# Patient Record
Sex: FEMALE | Race: WHITE | NOT HISPANIC OR LATINO | Employment: UNEMPLOYED | URBAN - NONMETROPOLITAN AREA
[De-identification: names, ages, dates, MRNs, and addresses within clinical notes are randomized per-mention and may not be internally consistent; named-entity substitution may affect disease eponyms.]

---

## 2018-05-09 ENCOUNTER — TRANSCRIBE ORDERS (OUTPATIENT)
Dept: ADMINISTRATIVE | Facility: HOSPITAL | Age: 58
End: 2018-05-09

## 2018-05-09 DIAGNOSIS — Z12.39 SCREENING BREAST EXAMINATION: ICD-10-CM

## 2018-05-09 DIAGNOSIS — M81.8 OSTEOPOROSIS AND OCULOCUTANEOUS HYPOPIGMENTATION SYNDROME: Primary | ICD-10-CM

## 2018-05-09 DIAGNOSIS — L81.8 OSTEOPOROSIS AND OCULOCUTANEOUS HYPOPIGMENTATION SYNDROME: Primary | ICD-10-CM

## 2018-05-09 DIAGNOSIS — Q87.89 OSTEOPOROSIS AND OCULOCUTANEOUS HYPOPIGMENTATION SYNDROME: Primary | ICD-10-CM

## 2018-05-24 ENCOUNTER — TRANSCRIBE ORDERS (OUTPATIENT)
Dept: CARDIOLOGY | Facility: CLINIC | Age: 58
End: 2018-05-24

## 2018-05-24 DIAGNOSIS — R07.9 CHEST PAIN, UNSPECIFIED TYPE: Primary | ICD-10-CM

## 2018-05-24 DIAGNOSIS — R06.02 SHORTNESS OF BREATH: ICD-10-CM

## 2018-05-24 DIAGNOSIS — R07.9 CHEST PAIN, UNSPECIFIED TYPE: ICD-10-CM

## 2018-05-24 DIAGNOSIS — R06.02 SHORTNESS OF BREATH: Primary | ICD-10-CM

## 2018-06-19 ENCOUNTER — APPOINTMENT (OUTPATIENT)
Dept: BONE DENSITY | Facility: HOSPITAL | Age: 58
End: 2018-06-19

## 2018-06-19 ENCOUNTER — APPOINTMENT (OUTPATIENT)
Dept: MAMMOGRAPHY | Facility: HOSPITAL | Age: 58
End: 2018-06-19

## 2018-06-19 LAB
BH CV STRESS BP STAGE 1: NORMAL
BH CV STRESS BP STAGE 2: NORMAL
BH CV STRESS DURATION MIN STAGE 1: 3
BH CV STRESS DURATION MIN STAGE 2: 3
BH CV STRESS DURATION MIN STAGE 3: 3
BH CV STRESS DURATION SEC STAGE 1: 0
BH CV STRESS DURATION SEC STAGE 2: 0
BH CV STRESS DURATION SEC STAGE 3: 0
BH CV STRESS GRADE STAGE 1: 10
BH CV STRESS GRADE STAGE 2: 12
BH CV STRESS GRADE STAGE 3: 14
BH CV STRESS HR STAGE 1: 121
BH CV STRESS HR STAGE 2: 139
BH CV STRESS HR STAGE 3: 161
BH CV STRESS METS STAGE 1: 5
BH CV STRESS METS STAGE 2: 7.5
BH CV STRESS METS STAGE 3: 10
BH CV STRESS O2 STAGE 1: 99
BH CV STRESS O2 STAGE 2: 98
BH CV STRESS O2 STAGE 3: 99
BH CV STRESS PROTOCOL 1: NORMAL
BH CV STRESS RECOVERY BP: NORMAL MMHG
BH CV STRESS RECOVERY HR: 77 BPM
BH CV STRESS RECOVERY O2: 99 %
BH CV STRESS SPEED STAGE 1: 1.7
BH CV STRESS SPEED STAGE 2: 2.5
BH CV STRESS SPEED STAGE 3: 3.4
BH CV STRESS STAGE 1: 1
BH CV STRESS STAGE 2: 2
BH CV STRESS STAGE 3: 3
MAXIMAL PREDICTED HEART RATE: 164 BPM
PERCENT MAX PREDICTED HR: 98.17 %
STRESS BASELINE BP: NORMAL MMHG
STRESS BASELINE HR: 75 BPM
STRESS O2 SAT REST: 99 %
STRESS PERCENT HR: 115 %
STRESS POST ESTIMATED WORKLOAD: 10.1 METS
STRESS POST EXERCISE DUR MIN: 7 MIN
STRESS POST O2 SAT PEAK: 98 %
STRESS POST PEAK BP: NORMAL MMHG
STRESS POST PEAK HR: 161 BPM
STRESS TARGET HR: 139 BPM

## 2018-07-24 ENCOUNTER — APPOINTMENT (OUTPATIENT)
Dept: BONE DENSITY | Facility: HOSPITAL | Age: 58
End: 2018-07-24

## 2018-07-24 ENCOUNTER — HOSPITAL ENCOUNTER (OUTPATIENT)
Dept: MAMMOGRAPHY | Facility: HOSPITAL | Age: 58
Discharge: HOME | End: 2018-07-24
Admitting: NURSE PRACTITIONER

## 2018-07-24 ENCOUNTER — APPOINTMENT (OUTPATIENT)
Dept: MAMMOGRAPHY | Facility: HOSPITAL | Age: 58
End: 2018-07-24

## 2018-07-24 DIAGNOSIS — L81.8 OSTEOPOROSIS AND OCULOCUTANEOUS HYPOPIGMENTATION SYNDROME: ICD-10-CM

## 2018-07-24 DIAGNOSIS — Z12.39 SCREENING BREAST EXAMINATION: ICD-10-CM

## 2018-07-24 DIAGNOSIS — M81.8 OSTEOPOROSIS AND OCULOCUTANEOUS HYPOPIGMENTATION SYNDROME: ICD-10-CM

## 2018-07-24 DIAGNOSIS — Q87.89 OSTEOPOROSIS AND OCULOCUTANEOUS HYPOPIGMENTATION SYNDROME: ICD-10-CM

## 2018-07-24 PROCEDURE — 77067 SCR MAMMO BI INCL CAD: CPT

## 2018-07-24 PROCEDURE — 77063 BREAST TOMOSYNTHESIS BI: CPT

## 2018-07-24 PROCEDURE — 77080 DXA BONE DENSITY AXIAL: CPT

## 2018-10-01 ENCOUNTER — OFFICE VISIT (OUTPATIENT)
Dept: INTERNAL MEDICINE | Facility: CLINIC | Age: 58
End: 2018-10-01

## 2018-10-01 VITALS
SYSTOLIC BLOOD PRESSURE: 126 MMHG | HEART RATE: 68 BPM | DIASTOLIC BLOOD PRESSURE: 74 MMHG | WEIGHT: 117 LBS | BODY MASS INDEX: 23.59 KG/M2 | HEIGHT: 59 IN

## 2018-10-01 DIAGNOSIS — J30.9 ALLERGIC RHINITIS, UNSPECIFIED SEASONALITY, UNSPECIFIED TRIGGER: ICD-10-CM

## 2018-10-01 DIAGNOSIS — K21.9 GASTROESOPHAGEAL REFLUX DISEASE WITHOUT ESOPHAGITIS: ICD-10-CM

## 2018-10-01 DIAGNOSIS — F41.1 ANXIETY STATE: ICD-10-CM

## 2018-10-01 DIAGNOSIS — E78.2 MIXED HYPERLIPIDEMIA: Primary | ICD-10-CM

## 2018-10-01 DIAGNOSIS — M81.0 OSTEOPOROSIS, UNSPECIFIED OSTEOPOROSIS TYPE, UNSPECIFIED PATHOLOGICAL FRACTURE PRESENCE: ICD-10-CM

## 2018-10-01 DIAGNOSIS — G47.09 OTHER INSOMNIA: ICD-10-CM

## 2018-10-01 DIAGNOSIS — Z13.220 SCREENING FOR LIPID DISORDERS: ICD-10-CM

## 2018-10-01 LAB
25(OH)D3+25(OH)D2 SERPL-MCNC: 38.6 NG/ML
ALBUMIN SERPL-MCNC: 4.6 G/DL (ref 3.2–4.8)
ALBUMIN/GLOB SERPL: 2.4 G/DL (ref 1.5–2.5)
ALP SERPL-CCNC: 61 U/L (ref 25–100)
ALT SERPL-CCNC: 58 U/L (ref 7–40)
AST SERPL-CCNC: 50 U/L (ref 0–33)
BILIRUB SERPL-MCNC: 0.3 MG/DL (ref 0.3–1.2)
BUN SERPL-MCNC: 15 MG/DL (ref 9–23)
BUN/CREAT SERPL: 18.5 (ref 7–25)
CALCIUM SERPL-MCNC: 9.2 MG/DL (ref 8.7–10.4)
CHLORIDE SERPL-SCNC: 101 MMOL/L (ref 99–109)
CHOLEST SERPL-MCNC: 142 MG/DL (ref 0–200)
CO2 SERPL-SCNC: 28 MMOL/L (ref 20–31)
CREAT SERPL-MCNC: 0.81 MG/DL (ref 0.6–1.3)
ERYTHROCYTE [DISTWIDTH] IN BLOOD BY AUTOMATED COUNT: 12.7 % (ref 11.3–14.5)
GLOBULIN SER CALC-MCNC: 1.9 GM/DL
GLUCOSE SERPL-MCNC: 82 MG/DL (ref 70–100)
HCT VFR BLD AUTO: 39.7 % (ref 34.5–44)
HDLC SERPL-MCNC: 56 MG/DL (ref 40–60)
HGB BLD-MCNC: 13 G/DL (ref 11.5–15.5)
LDLC SERPL CALC-MCNC: 67 MG/DL (ref 0–100)
MCH RBC QN AUTO: 29.7 PG (ref 27–31)
MCHC RBC AUTO-ENTMCNC: 32.7 G/DL (ref 32–36)
MCV RBC AUTO: 90.8 FL (ref 80–99)
PLATELET # BLD AUTO: 290 10*3/MM3 (ref 150–450)
POTASSIUM SERPL-SCNC: 4.4 MMOL/L (ref 3.5–5.5)
PROT SERPL-MCNC: 6.5 G/DL (ref 5.7–8.2)
RBC # BLD AUTO: 4.37 10*6/MM3 (ref 3.89–5.14)
SODIUM SERPL-SCNC: 137 MMOL/L (ref 132–146)
TRIGL SERPL-MCNC: 97 MG/DL (ref 0–150)
TSH SERPL DL<=0.005 MIU/L-ACNC: 1.51 MIU/ML (ref 0.35–5.35)
VIT B12 SERPL-MCNC: 760 PG/ML (ref 211–911)
VLDLC SERPL CALC-MCNC: 19.4 MG/DL
WBC # BLD AUTO: 11.32 10*3/MM3 (ref 3.5–10.8)

## 2018-10-01 PROCEDURE — 99204 OFFICE O/P NEW MOD 45 MIN: CPT | Performed by: INTERNAL MEDICINE

## 2018-10-01 RX ORDER — DICYCLOMINE HCL 20 MG
TABLET ORAL
Refills: 0 | COMMUNITY
Start: 2018-08-20 | End: 2019-07-23

## 2018-10-01 RX ORDER — GLUCOSAMINE/CHONDR SU A SOD 750-600 MG
TABLET ORAL DAILY
Refills: 0 | COMMUNITY
Start: 2018-09-24 | End: 2018-10-10 | Stop reason: SDUPTHER

## 2018-10-01 RX ORDER — CETIRIZINE HYDROCHLORIDE 10 MG/1
10 TABLET, FILM COATED ORAL DAILY
Refills: 0 | COMMUNITY
Start: 2018-09-21 | End: 2018-12-28 | Stop reason: SDUPTHER

## 2018-10-01 RX ORDER — OMEPRAZOLE 20 MG/1
20 CAPSULE, DELAYED RELEASE ORAL DAILY
Refills: 0 | COMMUNITY
Start: 2018-08-19 | End: 2018-11-13 | Stop reason: ALTCHOICE

## 2018-10-01 RX ORDER — FLUOXETINE HYDROCHLORIDE 20 MG/1
40 CAPSULE ORAL DAILY
Refills: 0 | COMMUNITY
Start: 2018-09-21 | End: 2018-12-28 | Stop reason: SDUPTHER

## 2018-10-01 RX ORDER — FLUTICASONE PROPIONATE 50 MCG
SPRAY, SUSPENSION (ML) NASAL
Refills: 0 | COMMUNITY
Start: 2018-09-21 | End: 2019-05-17 | Stop reason: SDUPTHER

## 2018-10-01 RX ORDER — ACETAMINOPHEN/DIPHENHYDRAMINE 500MG-25MG
81 TABLET ORAL DAILY
Refills: 0 | COMMUNITY
Start: 2018-09-28 | End: 2018-12-18 | Stop reason: SDUPTHER

## 2018-10-01 RX ORDER — ALENDRONATE SODIUM 10 MG/1
10 TABLET ORAL DAILY
Refills: 0 | COMMUNITY
Start: 2018-08-20 | End: 2019-02-12 | Stop reason: HOSPADM

## 2018-10-01 RX ORDER — ATORVASTATIN CALCIUM 20 MG/1
20 TABLET, FILM COATED ORAL NIGHTLY
Refills: 0 | COMMUNITY
Start: 2018-08-19 | End: 2018-12-18 | Stop reason: SDUPTHER

## 2018-10-01 RX ORDER — TIZANIDINE 4 MG/1
4 TABLET ORAL
Refills: 0 | COMMUNITY
Start: 2018-09-28 | End: 2019-02-11

## 2018-10-01 RX ORDER — DICLOFENAC SODIUM 75 MG/1
75 TABLET, DELAYED RELEASE ORAL 2 TIMES DAILY PRN
Refills: 0 | COMMUNITY
Start: 2018-08-20 | End: 2018-12-04

## 2018-10-01 RX ORDER — TRAZODONE HYDROCHLORIDE 50 MG/1
50 TABLET ORAL NIGHTLY PRN
Qty: 30 TABLET | Refills: 5 | Status: SHIPPED | OUTPATIENT
Start: 2018-10-01 | End: 2019-04-07 | Stop reason: SDUPTHER

## 2018-10-01 NOTE — PROGRESS NOTES
Patient is a 58 y.o. female who is here to establish care for chronic conditions.  Chief Complaint   Patient presents with   • Depression   • Heartburn         HPI:    Here for management of medical issue.  Has one year hx of GERD.  Onset about one year.  Had recent cardiac workup in 6/18.  No nocturnal symptoms.  Worst after meals.  Improved with PPI.  No recent EGD.    No weight loss.  No hemoptysis.    Also has Depression.  Compliant with Prozac that helps.  Has been on same med for 25 years.        History:    Patient Active Problem List   Diagnosis   • Allergic rhinitis   • Anxiety state   • Hyperlipidemia   • Osteoporosis   • Gastroesophageal reflux disease without esophagitis   • Other insomnia   • Screening for lipid disorders       No past medical history on file.    Past Surgical History:   Procedure Laterality Date   • AUGMENTATION MAMMAPLASTY         No current outpatient prescriptions on file prior to visit.     No current facility-administered medications on file prior to visit.        Family History   Problem Relation Age of Onset   • Breast cancer Maternal Aunt    • Lung cancer Mother    • Heart attack Father        Social History     Social History   • Marital status: Single     Spouse name: N/A   • Number of children: N/A   • Years of education: N/A     Occupational History   • Not on file.     Social History Main Topics   • Smoking status: Current Every Day Smoker   • Smokeless tobacco: Never Used   • Alcohol use No   • Drug use: No   • Sexual activity: Not on file     Other Topics Concern   • Not on file     Social History Narrative   • No narrative on file         Review of Systems   Constitutional: Positive for unexpected weight change. Negative for chills, fatigue and fever.   HENT: Negative for congestion, ear pain, hearing loss, rhinorrhea, sinus pressure, sore throat and trouble swallowing.    Eyes: Negative for discharge and itching.   Respiratory: Negative for cough, chest tightness and  "shortness of breath.    Cardiovascular: Negative for chest pain, palpitations and leg swelling.        Summer 2018 cardiac w/u neg   Gastrointestinal: Negative for abdominal pain, blood in stool, constipation, diarrhea and vomiting.        2/16 colonoscopy normal   Endocrine: Negative for polydipsia and polyuria.   Genitourinary: Negative for difficulty urinating, dysuria, enuresis, frequency, hematuria and urgency.        7/18 mammogram   Musculoskeletal: Negative for arthralgias, back pain, gait problem and joint swelling.   Skin: Negative for rash and wound.   Allergic/Immunologic: Negative for immunocompromised state.   Neurological: Negative for dizziness, syncope, weakness, light-headedness, numbness and headaches.   Hematological: Does not bruise/bleed easily.   Psychiatric/Behavioral: Positive for sleep disturbance. Negative for behavioral problems and dysphoric mood. The patient is not nervous/anxious.        /74   Pulse 68   Ht 149.9 cm (59.02\")   Wt 53.1 kg (117 lb)   BMI 23.62 kg/m²       Physical Exam   Constitutional: She is oriented to person, place, and time. She appears well-developed and well-nourished.   HENT:   Head: Normocephalic and atraumatic.   Right Ear: External ear normal.   Left Ear: External ear normal.   Mouth/Throat: Oropharynx is clear and moist.   Eyes: Conjunctivae and EOM are normal.   Neck: Normal range of motion. Neck supple.   Cardiovascular: Normal rate, regular rhythm and normal heart sounds.    Pulmonary/Chest: Effort normal and breath sounds normal.   Abdominal: Soft. Bowel sounds are normal.   Musculoskeletal: Normal range of motion.   Lymphadenopathy:     She has no cervical adenopathy.   Neurological: She is alert and oriented to person, place, and time.   Skin: Skin is warm and dry.   Psychiatric: She has a normal mood and affect. Her behavior is normal. Thought content normal.       Procedure:      Discussion/Summary:    GERD-will get EGD   Osteoporosis-cont " fosamax and will check Vit D  Hyperlipidemia-labs today  Insomnia-cont trazodone  Depression-labs today  High risk meds-labs today    Reviewed records available  Labs noted and dw patient      Current Outpatient Prescriptions:   •  alendronate (FOSAMAX) 10 MG tablet, Take 10 mg by mouth Daily., Disp: , Rfl: 0  •  ALL DAY ALLERGY 10 MG tablet, Take 10 mg by mouth Daily., Disp: , Rfl: 0  •  atorvastatin (LIPITOR) 20 MG tablet, Take 20 mg by mouth Every Night., Disp: , Rfl: 0  •  CALCITRATE 950 MG tablet, , Disp: , Rfl: 0  •  diclofenac (VOLTAREN) 75 MG EC tablet, Take 75 mg by mouth 2 (Two) Times a Day As Needed., Disp: , Rfl: 0  •  FLUoxetine (PROzac) 20 MG capsule, Take 40 mg by mouth Daily., Disp: , Rfl: 0  •  fluticasone (FLONASE) 50 MCG/ACT nasal spray, instill 2 sprays into each nostril once daily, Disp: , Rfl: 0  •  omeprazole (priLOSEC) 20 MG capsule, Take 20 mg by mouth Daily., Disp: , Rfl: 0  •  RA ASPIRIN EC 81 MG EC tablet, Take 81 mg by mouth Daily., Disp: , Rfl: 0  •  RA VITAMIN D-3 2000 units capsule, Take  by mouth Daily., Disp: , Rfl: 0  •  tiZANidine (ZANAFLEX) 4 MG tablet, Take 4 mg by mouth every night at bedtime., Disp: , Rfl: 0  •  dicyclomine (BENTYL) 20 MG tablet, take 1 tablet by mouth four times a day if needed, Disp: , Rfl: 0  •  traZODone (DESYREL) 50 MG tablet, Take 1 tablet by mouth At Night As Needed for Sleep., Disp: 30 tablet, Rfl: 5        Areli was seen today for depression and heartburn.    Diagnoses and all orders for this visit:    Mixed hyperlipidemia    Allergic rhinitis, unspecified seasonality, unspecified trigger    Gastroesophageal reflux disease without esophagitis  -     Ambulatory Referral to Gastroenterology  -     CBC (No Diff)  -     Comprehensive Metabolic Panel    Osteoporosis, unspecified osteoporosis type, unspecified pathological fracture presence  -     Vitamin D 25 Hydroxy    Anxiety state  -     TSH  -     Vitamin B12    Other insomnia  -     traZODone  (DESYREL) 50 MG tablet; Take 1 tablet by mouth At Night As Needed for Sleep.  -     TSH    Screening for lipid disorders  -     Lipid Panel

## 2018-10-03 LAB
HAV IGM SERPL QL IA: NEGATIVE
HBV CORE IGM SERPL QL IA: NEGATIVE
HBV SURFACE AG SERPL QL IA: NEGATIVE
HCV AB S/CO SERPL IA: <0.1 S/CO RATIO (ref 0–0.9)
WRITTEN AUTHORIZATION: NORMAL

## 2018-10-11 RX ORDER — GLUCOSAMINE/CHONDR SU A SOD 750-600 MG
TABLET ORAL
Qty: 30 EACH | Refills: 2 | Status: SHIPPED | OUTPATIENT
Start: 2018-10-11 | End: 2019-02-11

## 2018-11-13 ENCOUNTER — OFFICE VISIT (OUTPATIENT)
Dept: GASTROENTEROLOGY | Facility: CLINIC | Age: 58
End: 2018-11-13

## 2018-11-13 VITALS
WEIGHT: 119 LBS | TEMPERATURE: 98 F | RESPIRATION RATE: 16 BRPM | HEART RATE: 69 BPM | BODY MASS INDEX: 23.99 KG/M2 | HEIGHT: 59 IN | SYSTOLIC BLOOD PRESSURE: 110 MMHG | DIASTOLIC BLOOD PRESSURE: 63 MMHG

## 2018-11-13 DIAGNOSIS — R07.89 OTHER CHEST PAIN: Primary | ICD-10-CM

## 2018-11-13 DIAGNOSIS — R12 HEARTBURN: ICD-10-CM

## 2018-11-13 DIAGNOSIS — R11.0 NAUSEA: ICD-10-CM

## 2018-11-13 DIAGNOSIS — R13.10 DYSPHAGIA, UNSPECIFIED TYPE: ICD-10-CM

## 2018-11-13 DIAGNOSIS — R10.33 PERIUMBILICAL ABDOMINAL PAIN: ICD-10-CM

## 2018-11-13 PROCEDURE — 99214 OFFICE O/P EST MOD 30 MIN: CPT | Performed by: INTERNAL MEDICINE

## 2018-11-13 NOTE — PROGRESS NOTES
Chief Complaint   Patient presents with   • Chest Pain     History of Present Illness     The patient been having some retrosternal chest pains off and on for the last 1 year or so. The pain is described as mild tightness-squeeze-type feeling perhaps 3-4 times a week. The pain is nonexertional and may last for 5 seconds or so. Patient had undergone some cardiac workup that included echocardiogram and a stress test. Upon review of records dated June 19, 2018 the patient had undergone a normal stress test following Vinod protocol. The patient achieved a target heart rate without symptoms or EKG changes.    The patient has a history of reflux off and on for the last 1 year .  The reflux is mildly severe.  Symptoms are described as retrosternal burning sensation, and indigestion.  There is no history of occasional regurgitative symptoms.  Frequency being 1-2 per week.  The symptoms are worse at night.  She'll talk Prilosec with improvement of her symptoms. However lately she has been taken off the medication. The patient believes that with Prilosec her chest pains also improved. Since she is off the medication she is getting some indigestion and atypical chest pains.      The patient has history of recurrent nausea for the last several years.  The nausea is described as mild, frequency being a couple of times a month.  Nauseous feeling may last for several minutes.  Eating worsens the symptom however no definite relieving factors of nausea.  There is no associated vomiting. The patient has vertigo for the last several years and she turns her head.    The patient has difficulty swallowing off and for the last months. The symptom is moderate in severity, occurs perhaps once or twice a month  and is mostly associated with solid foods.  The symptoms are not progressive.  The patient points towards the neck area.  There is no associated weight loss.    The patient denies diarrhea or constipation. There is no history of overt  GI bleed (hematemesis, melena or hematochezia).    The patient has history of periumbilical abdominal cramping off and on for the last few years. She would feel constipated and then would have some explosive diarrhea. The patient would also break and 2 cold sweat and use to feel nauseated. The patient has taken Bentyl-dicyclomine with improvement of her symptoms. Now she takes on occasions perhaps once in 1-2 months.    Review of Systems   Constitutional: Positive for unexpected weight change. Negative for appetite change, chills, fatigue and fever.   HENT: Positive for trouble swallowing. Negative for mouth sores and nosebleeds.    Eyes: Positive for itching. Negative for discharge and redness.        Dry eyes     Respiratory: Positive for cough. Negative for apnea and shortness of breath.    Cardiovascular: Positive for chest pain. Negative for palpitations and leg swelling.   Gastrointestinal: Positive for nausea. Negative for abdominal distention, abdominal pain, anal bleeding, blood in stool, constipation, diarrhea and vomiting.   Endocrine: Negative for cold intolerance, heat intolerance and polydipsia.   Genitourinary: Negative for dysuria, hematuria and urgency.   Musculoskeletal: Positive for arthralgias. Negative for joint swelling and myalgias.   Skin: Negative for rash.   Allergic/Immunologic: Positive for food allergies (but patient can tolerate these without avoidance). Negative for immunocompromised state.   Neurological: Positive for dizziness. Negative for seizures, syncope and headaches.   Hematological: Negative for adenopathy. Does not bruise/bleed easily.   Psychiatric/Behavioral: Negative for dysphoric mood. The patient is not nervous/anxious and is not hyperactive.      Patient Active Problem List   Diagnosis   • Allergic rhinitis   • Anxiety state   • Hyperlipidemia   • Osteoporosis   • Gastroesophageal reflux disease without esophagitis   • Other insomnia   • Screening for lipid disorders  "    Past Medical History:   Diagnosis Date   • Anemia    • Depression    • Elevated LFTs    • Exposure to TB     never tested +   • Hyperlipidemia    • Plantar fasciitis    • Vertigo      Past Surgical History:   Procedure Laterality Date   • ABDOMINOPLASTY  2003   • AUGMENTATION MAMMAPLASTY       Family History   Problem Relation Age of Onset   • Breast cancer Maternal Aunt    • Lung cancer Mother    • Heart attack Father    • Crohn's disease Sister    • Stomach cancer Paternal Grandfather    • Alcohol abuse Paternal Grandfather    • Colon cancer Neg Hx    • Cirrhosis Neg Hx    • Liver disease Neg Hx    • Liver cancer Neg Hx    • Ulcerative colitis Neg Hx    • Esophageal cancer Neg Hx      Social History     Tobacco Use   • Smoking status: Current Every Day Smoker     Packs/day: 0.25     Types: Cigarettes     Start date: 2012   • Smokeless tobacco: Never Used   • Tobacco comment: Smoked as young adult, but quit with pregnancies   Substance Use Topics   • Alcohol use: No     Comment: 'Former use\"       Current Outpatient Medications:   •  alendronate (FOSAMAX) 10 MG tablet, Take 10 mg by mouth Daily., Disp: , Rfl: 0  •  ALL DAY ALLERGY 10 MG tablet, Take 10 mg by mouth Daily., Disp: , Rfl: 0  •  atorvastatin (LIPITOR) 20 MG tablet, Take 20 mg by mouth Every Night., Disp: , Rfl: 0  •  CALCITRATE 950 MG tablet, , Disp: , Rfl: 0  •  diclofenac (VOLTAREN) 75 MG EC tablet, Take 75 mg by mouth 2 (Two) Times a Day As Needed., Disp: , Rfl: 0  •  dicyclomine (BENTYL) 20 MG tablet, take 1 tablet by mouth four times a day if needed, Disp: , Rfl: 0  •  FLUoxetine (PROzac) 20 MG capsule, Take 40 mg by mouth Daily., Disp: , Rfl: 0  •  fluticasone (FLONASE) 50 MCG/ACT nasal spray, instill 2 sprays into each nostril once daily, Disp: , Rfl: 0  •  RA ASPIRIN EC 81 MG EC tablet, Take 81 mg by mouth Daily., Disp: , Rfl: 0  •  RA VITAMIN D-3 2000 units capsule, take 1 capsule by mouth once daily, Disp: 30 each, Rfl: 2  •  tiZANidine " "(ZANAFLEX) 4 MG tablet, Take 4 mg by mouth every night at bedtime., Disp: , Rfl: 0  •  traZODone (DESYREL) 50 MG tablet, Take 1 tablet by mouth At Night As Needed for Sleep., Disp: 30 tablet, Rfl: 5    Allergies   Allergen Reactions   • Latex Rash   • Penicillins Rash       Blood pressure 110/63, pulse 69, temperature 98 °F (36.7 °C), resp. rate 16, height 149.9 cm (59\"), weight 54 kg (119 lb), not currently breastfeeding.    Physical Exam   Constitutional: She is oriented to person, place, and time. She appears well-developed and well-nourished. No distress.   HENT:   Head: Normocephalic and atraumatic.   Right Ear: Hearing and external ear normal.   Left Ear: Hearing and external ear normal.   Nose: Nose normal.   Mouth/Throat: Oropharynx is clear and moist and mucous membranes are normal. Mucous membranes are not pale, not dry and not cyanotic. No oral lesions. No oropharyngeal exudate.   Eyes: Conjunctivae and EOM are normal. Right eye exhibits no discharge. Left eye exhibits no discharge. No scleral icterus.   Neck: Trachea normal. Neck supple. No JVD present. No edema present. No thyroid mass and no thyromegaly present.   Cardiovascular: Normal rate, regular rhythm, S2 normal and normal heart sounds. Exam reveals no gallop, no S3 and no friction rub.   No murmur heard.  Pulmonary/Chest: Effort normal and breath sounds normal. No respiratory distress. She has no wheezes. She has no rales. She exhibits no tenderness.   Abdominal: Soft. Normal appearance and bowel sounds are normal. She exhibits no distension, no ascites and no mass. There is no splenomegaly or hepatomegaly. There is no tenderness. There is no rigidity, no rebound and no guarding. No hernia.   Musculoskeletal: She exhibits no tenderness or deformity.     Vascular Status -  Her right foot exhibits no edema. Her left foot exhibits no edema.  Lymphadenopathy:     She has no cervical adenopathy.        Left: No supraclavicular adenopathy present. "   Neurological: She is alert and oriented to person, place, and time. She has normal strength. No cranial nerve deficit or sensory deficit. She exhibits normal muscle tone. Coordination normal.   Skin: No rash noted. She is not diaphoretic. No cyanosis. No pallor. Nails show no clubbing.   Psychiatric: She has a normal mood and affect. Her behavior is normal. Judgment and thought content normal.   Nursing note and vitals reviewed.  Stigmata of chronic liver disease:  None.  Asterixis:  None.    Laboratory Testing:  Upon review of medical records:    Dated October 1, 2018 sodium 137 potassium 4.4 chloride 101 CO2 28 BUN 15 serum creatinine 0.81 and glucose 82.  Calcium 9.2.  Total protein 6.5.  Albumin 4.60.  T bili 0.3 AST 50 ALT 58 alkaline phosphatase 61.  TSH 1.507.  Vitamin B12 level 760.  WBC 11.32 hemoglobin 13.0 hematocrit 39.7 MCV 90.8 and platelet count 290.  Hepatitis A IgM negative.  Hepatitis B surface antigen negative.  Hepatitis B core IgM negative.  Hepatitis C virus antibody negative at <0.1.    Procedures:   Upon review of medical records:     Dated February 2, 2016 the patient underwent a colonoscopy to the terminal ileum which revealed: Colon polyps.  Scant vascular ectasia-nonbleeding.  Internal hemorrhoids.  Rectal polyps, biopsy revealed hyperplastic polyp fragments.  Cecum polyps, biopsy revealed polypoid colon mucosa with surface reactive epithelial changes. Negative for adenoma or hyperplasia.    Assessment:      ICD-10-CM ICD-9-CM   1. Other chest pain R07.89 786.59   2. Heartburn R12 787.1   3. Nausea R11.0 787.02   4. Dysphagia, unspecified type R13.10 787.20   5. Periumbilical abdominal pain R10.33 789.05         Discussion:  1.     Plan/  Patient Instructions   1. Anti-reflux measures.  2. The patient should avoid drinking soda beverages.  3. The patient was counseled for smoking cessation (Smoking cessation counseling/symptomatic/77704/3 minutes).  4. Upper endoscopy (EGD) counseling:   Description of the procedure, risks, benefits, alternatives and options including nonoperative options were discussed with the patient in detail.  The patient understands and wishes to proceed.       Brenden Steward MD

## 2018-11-13 NOTE — PATIENT INSTRUCTIONS
1. Anti-reflux measures.  2. The patient should avoid drinking soda beverages.  3. The patient was counseled for smoking cessation (Smoking cessation counseling/symptomatic/61492/3 minutes).  4. Upper endoscopy (EGD) counseling:  Description of the procedure, risks, benefits, alternatives and options including nonoperative options were discussed with the patient in detail.  The patient understands and wishes to proceed.

## 2018-11-16 ENCOUNTER — TELEPHONE (OUTPATIENT)
Dept: GASTROENTEROLOGY | Facility: CLINIC | Age: 58
End: 2018-11-16

## 2018-11-16 NOTE — TELEPHONE ENCOUNTER
Called patient and left a message for her to call us.  Made an appt for her with Dr. Garcia [address] on December 4th at 130.  She would like to have her EGD and if needed her cortisone injections for her foot done at the same time.  However, she will need to establish care with Dr. Garcia 1st and see what treatment plans he would recommend.  She can discuss the injections with him at this visit.  After this visit, she would need to call us and let us know so that she can be scheduled for EGD in the future.

## 2018-11-20 NOTE — TELEPHONE ENCOUNTER
Called emergency contact, and he had patient call me on 11/20/18.  Information given.  She states understanding.

## 2018-11-29 ENCOUNTER — TELEPHONE (OUTPATIENT)
Dept: INTERNAL MEDICINE | Facility: CLINIC | Age: 58
End: 2018-11-29

## 2018-11-29 NOTE — TELEPHONE ENCOUNTER
Pt is wanting to come in on a Tuesday and soon as possible. Pt wants to up prozac to 60 mg and she wants something to stop her from smoking. Pt said when you call her back if she dont answer to leave a message on her voice mail to let her no what Tuesday will work

## 2018-12-03 DIAGNOSIS — M25.572 ARTHRALGIA OF LEFT FOOT: Primary | ICD-10-CM

## 2018-12-04 ENCOUNTER — OFFICE VISIT (OUTPATIENT)
Dept: ORTHOPEDIC SURGERY | Facility: CLINIC | Age: 58
End: 2018-12-04

## 2018-12-04 VITALS — RESPIRATION RATE: 18 BRPM | HEIGHT: 59 IN | BODY MASS INDEX: 23.99 KG/M2 | WEIGHT: 119 LBS

## 2018-12-04 DIAGNOSIS — M72.2 PLANTAR FASCIITIS: Primary | ICD-10-CM

## 2018-12-04 PROCEDURE — 99203 OFFICE O/P NEW LOW 30 MIN: CPT | Performed by: PODIATRIST

## 2018-12-04 RX ORDER — METHYLPREDNISOLONE 4 MG/1
TABLET ORAL
Qty: 21 TABLET | Refills: 0 | Status: SHIPPED | OUTPATIENT
Start: 2018-12-04 | End: 2018-12-11

## 2018-12-04 RX ORDER — ETODOLAC 200 MG/1
200 CAPSULE ORAL EVERY 8 HOURS
Qty: 90 CAPSULE | Refills: 0 | Status: SHIPPED | OUTPATIENT
Start: 2018-12-04 | End: 2019-02-04

## 2018-12-04 NOTE — PROGRESS NOTES
Subjective   Patient ID: Areli Barnett is a 58 y.o. female   Pain of the Left Foot and Pain of the Right Foot  Patient comes in today with a chief complaint of bilateral heel pain.  She tells me she has and has had plantar fasciitis.  She previously saw Dr. Rand who gave her inserts and showed her taping as well as recommended shoes but she states she never had injections.  She states she was given anti-inflammatories but the one she's been taking recently which sounds like full tear and has not been working but she used to take Lodine which seemed to work better.  She states that her gastroenterologist told her that she can have injections done to her feet while she was under anesthesia for colonoscopy.    History of Present Illness    Pain Score: 5  Pain Location: Foot  Pain Orientation: Right, Left     Pain Descriptors: Aching, Dull, Burning  Pain Frequency: Intermittent  Pain Onset: Ongoing(reports pain since 2015)     Clinical Progression: Not changed           Pain Intervention(s): Rest  Result of Injury: No       Review of Systems   Constitutional: Negative.    Musculoskeletal: Positive for arthralgias (bilateral foot).   Skin: Negative.    Allergic/Immunologic: Negative.    Hematological: Negative.        Past Medical History:   Diagnosis Date   • Anemia    • Depression    • Elevated LFTs    • Exposure to TB     never tested +   • Hyperlipidemia    • Plantar fasciitis    • Vertigo         Past Surgical History:   Procedure Laterality Date   • ABDOMINOPLASTY  2003   • AUGMENTATION MAMMAPLASTY         Allergies   Allergen Reactions   • Latex Rash   • Penicillins Rash         Current Outpatient Medications:   •  alendronate (FOSAMAX) 10 MG tablet, Take 10 mg by mouth Daily., Disp: , Rfl: 0  •  ALL DAY ALLERGY 10 MG tablet, Take 10 mg by mouth Daily., Disp: , Rfl: 0  •  atorvastatin (LIPITOR) 20 MG tablet, Take 20 mg by mouth Every Night., Disp: , Rfl: 0  •  CALCITRATE 950 MG tablet, , Disp: , Rfl: 0  •   diclofenac (VOLTAREN) 1 % gel gel, Apply 4 g topically to the appropriate area as directed 4 (Four) Times a Day., Disp: 100 g, Rfl: 1  •  dicyclomine (BENTYL) 20 MG tablet, take 1 tablet by mouth four times a day if needed, Disp: , Rfl: 0  •  etodolac (LODINE) 200 MG capsule, Take 1 capsule by mouth Every 8 (Eight) Hours., Disp: 90 capsule, Rfl: 0  •  FLUoxetine (PROzac) 20 MG capsule, Take 40 mg by mouth Daily., Disp: , Rfl: 0  •  fluticasone (FLONASE) 50 MCG/ACT nasal spray, instill 2 sprays into each nostril once daily, Disp: , Rfl: 0  •  RA ASPIRIN EC 81 MG EC tablet, Take 81 mg by mouth Daily., Disp: , Rfl: 0  •  RA VITAMIN D-3 2000 units capsule, take 1 capsule by mouth once daily, Disp: 30 each, Rfl: 2  •  tiZANidine (ZANAFLEX) 4 MG tablet, Take 4 mg by mouth every night at bedtime., Disp: , Rfl: 0  •  traZODone (DESYREL) 50 MG tablet, Take 1 tablet by mouth At Night As Needed for Sleep., Disp: 30 tablet, Rfl: 5    Family History   Problem Relation Age of Onset   • Breast cancer Maternal Aunt    • Lung cancer Mother    • Heart attack Father    • Crohn's disease Sister    • Stomach cancer Paternal Grandfather    • Alcohol abuse Paternal Grandfather    • Colon cancer Neg Hx    • Cirrhosis Neg Hx    • Liver disease Neg Hx    • Liver cancer Neg Hx    • Ulcerative colitis Neg Hx    • Esophageal cancer Neg Hx        Social History     Socioeconomic History   • Marital status: Single     Spouse name: Not on file   • Number of children: Not on file   • Years of education: Not on file   • Highest education level: Not on file   Social Needs   • Financial resource strain: Not on file   • Food insecurity - worry: Not on file   • Food insecurity - inability: Not on file   • Transportation needs - medical: Not on file   • Transportation needs - non-medical: Not on file   Occupational History   • Not on file   Tobacco Use   • Smoking status: Current Every Day Smoker     Packs/day: 0.25     Types: Cigarettes     Start date:  "2012   • Smokeless tobacco: Never Used   • Tobacco comment: Smoked as young adult, but quit with pregnancies   Substance and Sexual Activity   • Alcohol use: No     Comment: 'Former use\"   • Drug use: No   • Sexual activity: Defer   Other Topics Concern   • Not on file   Social History Narrative   • Not on file       Ready to quit: No  Counseling given: Yes  Comment: Smoked as young adult, but quit with pregnancies       I have reviewed all of the above social hx, family hx, surgical hx, medications, allergies & ROS and confirm that it is accurate.  Objective   Physical Exam   Constitutional: She is oriented to person, place, and time. She appears well-developed and well-nourished.   HENT:   Head: Normocephalic and atraumatic.   Eyes: EOM are normal. Pupils are equal, round, and reactive to light.   Neck: Normal range of motion.   Cardiovascular: Normal rate.   Pulmonary/Chest: Effort normal.   Abdominal: Soft.   Musculoskeletal: Normal range of motion.   Neurological: She is alert and oriented to person, place, and time. She has normal reflexes.   Skin: Skin is warm.   Psychiatric: She has a normal mood and affect. Her behavior is normal. Judgment and thought content normal.   Vitals reviewed.    Right Ankle Exam     Range of Motion   The patient has normal right ankle ROM.    Muscle Strength   The patient has normal right ankle strength.    Other   Sensation: normal  Pulse: present     Comments:  Patient is ttp at the medial tubercle of the plantar calcneus at the plantar fascial insertion, mild edema is present      Left Ankle Exam     Range of Motion   The patient has normal left ankle ROM.     Muscle Strength   The patient has normal left ankle strength.    Other   Sensation: normal  Pulse: present    Comments:  Patient is ttp at the plantar medial aspect of the calcaneal tuberosity.              Assessment/Plan bilateral plantar fasciitis.  Independent Review of Radiographic Studies:      Laboratory and Other " Studies:     Medical Decision Making:        Procedures  [] No procedures were performed in office today.    Areli was seen today for pain and pain.    Diagnoses and all orders for this visit:    Plantar fasciitis    Other orders  -     diclofenac (VOLTAREN) 1 % gel gel; Apply 4 g topically to the appropriate area as directed 4 (Four) Times a Day.  -     etodolac (LODINE) 200 MG capsule; Take 1 capsule by mouth Every 8 (Eight) Hours.          Recommendations/Plan:  I discussed multiple treatment options in which it does not seem that were explored with Dr. Rand.  I recommend she continue with her good supportive shoes and her orthotics.  I will change her medication at her request and discontinue the diclofenac and re-prescribe Lodine.  I will also prescribe her topical Voltaren gel.  She states she had a compounded cream in the past but her insurance stopped covering this.  I discussed stretching exercises with her as well as a Shamrock sock and night splint.  She would like to try the night splint if her insurance will cover this.  I explained we will have to check into this for her.  I explained that steroid injections are done on a regular basis within the office and taking her to the operating room to do this or having this done while she's having a colonoscopy is not standard practice or advisable.  I discussed physical therapy as well as other treatment options in the future if needed.  I explained most people improve if they're willing to follow protocol.  I'll have her follow back up in about 3-4 weeks.  I also discussed ice massage and stretching.    Return in about 3 weeks (around 12/25/2018).  Patient agreeable to call or return sooner for any concerns.

## 2018-12-05 ENCOUNTER — TELEPHONE (OUTPATIENT)
Dept: INTERNAL MEDICINE | Facility: CLINIC | Age: 58
End: 2018-12-05

## 2018-12-05 NOTE — TELEPHONE ENCOUNTER
PT WAS TOLD BY DR PRADHAN TO CALL AND SPEAK WITH YOU? SHE SAID YOU CAN LEAVE A MESSAGE IF NEED BE.

## 2018-12-11 ENCOUNTER — OFFICE VISIT (OUTPATIENT)
Dept: INTERNAL MEDICINE | Facility: CLINIC | Age: 58
End: 2018-12-11

## 2018-12-11 VITALS
HEART RATE: 68 BPM | DIASTOLIC BLOOD PRESSURE: 70 MMHG | HEIGHT: 59 IN | BODY MASS INDEX: 23.59 KG/M2 | WEIGHT: 117 LBS | SYSTOLIC BLOOD PRESSURE: 120 MMHG

## 2018-12-11 DIAGNOSIS — J30.9 ALLERGIC RHINITIS, UNSPECIFIED SEASONALITY, UNSPECIFIED TRIGGER: ICD-10-CM

## 2018-12-11 DIAGNOSIS — F32.1 CURRENT MODERATE EPISODE OF MAJOR DEPRESSIVE DISORDER, UNSPECIFIED WHETHER RECURRENT (HCC): ICD-10-CM

## 2018-12-11 DIAGNOSIS — E78.2 MIXED HYPERLIPIDEMIA: Primary | ICD-10-CM

## 2018-12-11 DIAGNOSIS — F41.1 ANXIETY STATE: ICD-10-CM

## 2018-12-11 DIAGNOSIS — M81.0 OSTEOPOROSIS, UNSPECIFIED OSTEOPOROSIS TYPE, UNSPECIFIED PATHOLOGICAL FRACTURE PRESENCE: ICD-10-CM

## 2018-12-11 DIAGNOSIS — K21.9 GASTROESOPHAGEAL REFLUX DISEASE WITHOUT ESOPHAGITIS: ICD-10-CM

## 2018-12-11 DIAGNOSIS — G47.09 OTHER INSOMNIA: ICD-10-CM

## 2018-12-11 PROBLEM — F32.A DEPRESSED: Status: ACTIVE | Noted: 2018-12-11

## 2018-12-11 PROCEDURE — 99214 OFFICE O/P EST MOD 30 MIN: CPT | Performed by: INTERNAL MEDICINE

## 2018-12-11 RX ORDER — BUPROPION HYDROCHLORIDE 150 MG/1
150 TABLET ORAL EVERY MORNING
Qty: 30 TABLET | Refills: 5 | Status: SHIPPED | OUTPATIENT
Start: 2018-12-11 | End: 2019-06-02 | Stop reason: SDUPTHER

## 2018-12-11 NOTE — PROGRESS NOTES
Patient is a 58 y.o. female who is here for a follow up of chronic conditions.  Chief Complaint   Patient presents with   • Hyperlipidemia         HPI:    Here for f/u.  Reports Prozac is not helping as much with the depression.  Wants to cry often.  Less motivation.  Wants to stop smoking.  GERD is controlled.  No nausea or emesis.  No abdominal pains.     History:    Patient Active Problem List   Diagnosis   • Allergic rhinitis   • Anxiety state   • Hyperlipidemia   • Osteoporosis   • Gastroesophageal reflux disease without esophagitis   • Other insomnia   • Screening for lipid disorders   • Depressed       Past Medical History:   Diagnosis Date   • Anemia    • Depression    • Elevated LFTs    • Exposure to TB     never tested +   • Hyperlipidemia    • Plantar fasciitis    • Vertigo        Past Surgical History:   Procedure Laterality Date   • ABDOMINOPLASTY  2003   • AUGMENTATION MAMMAPLASTY         Current Outpatient Medications on File Prior to Visit   Medication Sig   • alendronate (FOSAMAX) 10 MG tablet Take 10 mg by mouth Daily.   • ALL DAY ALLERGY 10 MG tablet Take 10 mg by mouth Daily.   • atorvastatin (LIPITOR) 20 MG tablet Take 20 mg by mouth Every Night.   • CALCITRATE 950 MG tablet    • diclofenac (VOLTAREN) 1 % gel gel Apply 4 g topically to the appropriate area as directed 4 (Four) Times a Day.   • dicyclomine (BENTYL) 20 MG tablet take 1 tablet by mouth four times a day if needed   • etodolac (LODINE) 200 MG capsule Take 1 capsule by mouth Every 8 (Eight) Hours.   • FLUoxetine (PROzac) 20 MG capsule Take 40 mg by mouth Daily.   • fluticasone (FLONASE) 50 MCG/ACT nasal spray instill 2 sprays into each nostril once daily   • RA ASPIRIN EC 81 MG EC tablet Take 81 mg by mouth Daily.   • RA VITAMIN D-3 2000 units capsule take 1 capsule by mouth once daily   • tiZANidine (ZANAFLEX) 4 MG tablet Take 4 mg by mouth every night at bedtime.   • traZODone (DESYREL) 50 MG tablet Take 1 tablet by mouth At Night  "As Needed for Sleep.   • [DISCONTINUED] MethylPREDNISolone (MEDROL, ANNY,) 4 MG tablet Use as directed by package instructions (Patient taking differently: Use as directed by package instructions)     No current facility-administered medications on file prior to visit.        Family History   Problem Relation Age of Onset   • Breast cancer Maternal Aunt    • Lung cancer Mother    • Heart attack Father    • Crohn's disease Sister    • Stomach cancer Paternal Grandfather    • Alcohol abuse Paternal Grandfather    • Colon cancer Neg Hx    • Cirrhosis Neg Hx    • Liver disease Neg Hx    • Liver cancer Neg Hx    • Ulcerative colitis Neg Hx    • Esophageal cancer Neg Hx        Social History     Socioeconomic History   • Marital status: Single     Spouse name: Not on file   • Number of children: Not on file   • Years of education: Not on file   • Highest education level: Not on file   Social Needs   • Financial resource strain: Not on file   • Food insecurity - worry: Not on file   • Food insecurity - inability: Not on file   • Transportation needs - medical: Not on file   • Transportation needs - non-medical: Not on file   Occupational History   • Not on file   Tobacco Use   • Smoking status: Current Every Day Smoker     Packs/day: 0.25     Types: Cigarettes     Start date: 2012   • Smokeless tobacco: Never Used   • Tobacco comment: Smoked as young adult, but quit with pregnancies   Substance and Sexual Activity   • Alcohol use: No     Comment: 'Former use\"   • Drug use: No   • Sexual activity: Defer   Other Topics Concern   • Not on file   Social History Narrative   • Not on file         Review of Systems   Constitutional: Negative for chills, fatigue and fever.   HENT: Negative for congestion, ear pain, hearing loss, rhinorrhea, sinus pressure, sore throat and trouble swallowing.    Eyes: Negative for discharge and itching.   Respiratory: Negative for cough, chest tightness and shortness of breath.    Cardiovascular: " "Negative for chest pain, palpitations and leg swelling.        Summer 2018 cardiac w/u neg   Gastrointestinal: Negative for abdominal pain, blood in stool, constipation, diarrhea and vomiting.        2/16 colonoscopy normal   Endocrine: Negative for polydipsia and polyuria.   Genitourinary: Negative for difficulty urinating, dysuria, enuresis, frequency, hematuria and urgency.        7/18 mammogram   Musculoskeletal: Negative for arthralgias, back pain, gait problem and joint swelling.   Skin: Negative for rash and wound.   Allergic/Immunologic: Negative for immunocompromised state.   Neurological: Negative for dizziness, syncope, weakness, light-headedness, numbness and headaches.   Hematological: Does not bruise/bleed easily.   Psychiatric/Behavioral: Positive for behavioral problems, dysphoric mood and sleep disturbance. The patient is not nervous/anxious.        /70   Pulse 68   Ht 149.9 cm (59.02\")   Wt 53.1 kg (117 lb)   LMP  (LMP Unknown)   BMI 23.62 kg/m²       Physical Exam   Constitutional: She is oriented to person, place, and time. She appears well-developed and well-nourished.   HENT:   Head: Normocephalic and atraumatic.   Right Ear: External ear normal.   Left Ear: External ear normal.   Mouth/Throat: Oropharynx is clear and moist.   Eyes: Conjunctivae and EOM are normal.   Neck: Normal range of motion. Neck supple.   Cardiovascular: Normal rate, regular rhythm and normal heart sounds.   Pulmonary/Chest: Effort normal and breath sounds normal.   Abdominal: Soft. Bowel sounds are normal.   Musculoskeletal: Normal range of motion.   Lymphadenopathy:     She has no cervical adenopathy.   Neurological: She is alert and oriented to person, place, and time.   Skin: Skin is warm and dry.   Psychiatric: She has a normal mood and affect. Her behavior is normal. Thought content normal.       Procedure:      Discussion/Summary:    GERD-will get EGD   Osteoporosis-cont fosamax and will check Vit " D  Hyperlipidemia-labs noted  Insomnia-cont trazodone  Depression-add Wellbutrin  Elevated LFTs-recheck on rtc     Reviewed records available  Labs noted and dw patient        Current Outpatient Medications:   •  alendronate (FOSAMAX) 10 MG tablet, Take 10 mg by mouth Daily., Disp: , Rfl: 0  •  ALL DAY ALLERGY 10 MG tablet, Take 10 mg by mouth Daily., Disp: , Rfl: 0  •  atorvastatin (LIPITOR) 20 MG tablet, Take 20 mg by mouth Every Night., Disp: , Rfl: 0  •  CALCITRATE 950 MG tablet, , Disp: , Rfl: 0  •  diclofenac (VOLTAREN) 1 % gel gel, Apply 4 g topically to the appropriate area as directed 4 (Four) Times a Day., Disp: 100 g, Rfl: 1  •  dicyclomine (BENTYL) 20 MG tablet, take 1 tablet by mouth four times a day if needed, Disp: , Rfl: 0  •  etodolac (LODINE) 200 MG capsule, Take 1 capsule by mouth Every 8 (Eight) Hours., Disp: 90 capsule, Rfl: 0  •  FLUoxetine (PROzac) 20 MG capsule, Take 40 mg by mouth Daily., Disp: , Rfl: 0  •  fluticasone (FLONASE) 50 MCG/ACT nasal spray, instill 2 sprays into each nostril once daily, Disp: , Rfl: 0  •  RA ASPIRIN EC 81 MG EC tablet, Take 81 mg by mouth Daily., Disp: , Rfl: 0  •  RA VITAMIN D-3 2000 units capsule, take 1 capsule by mouth once daily, Disp: 30 each, Rfl: 2  •  tiZANidine (ZANAFLEX) 4 MG tablet, Take 4 mg by mouth every night at bedtime., Disp: , Rfl: 0  •  traZODone (DESYREL) 50 MG tablet, Take 1 tablet by mouth At Night As Needed for Sleep., Disp: 30 tablet, Rfl: 5  •  buPROPion XL (WELLBUTRIN XL) 150 MG 24 hr tablet, Take 1 tablet by mouth Every Morning., Disp: 30 tablet, Rfl: 5        Areli was seen today for hyperlipidemia.    Diagnoses and all orders for this visit:    Mixed hyperlipidemia    Allergic rhinitis, unspecified seasonality, unspecified trigger    Gastroesophageal reflux disease without esophagitis    Osteoporosis, unspecified osteoporosis type, unspecified pathological fracture presence    Other insomnia    Anxiety state    Current moderate  episode of major depressive disorder, unspecified whether recurrent (CMS/HCC)  -     buPROPion XL (WELLBUTRIN XL) 150 MG 24 hr tablet; Take 1 tablet by mouth Every Morning.

## 2018-12-18 RX ORDER — ATORVASTATIN CALCIUM 20 MG/1
TABLET, FILM COATED ORAL
Qty: 30 TABLET | Refills: 1 | Status: SHIPPED | OUTPATIENT
Start: 2018-12-18 | End: 2018-12-28 | Stop reason: SDUPTHER

## 2018-12-18 RX ORDER — ACETAMINOPHEN/DIPHENHYDRAMINE 500MG-25MG
TABLET ORAL
Qty: 30 TABLET | Refills: 2 | Status: SHIPPED | OUTPATIENT
Start: 2018-12-18 | End: 2019-02-11

## 2018-12-31 ENCOUNTER — TELEPHONE (OUTPATIENT)
Dept: INTERNAL MEDICINE | Facility: CLINIC | Age: 58
End: 2018-12-31

## 2018-12-31 RX ORDER — CETIRIZINE HYDROCHLORIDE 10 MG/1
TABLET ORAL
Qty: 30 TABLET | Refills: 0 | Status: SHIPPED | OUTPATIENT
Start: 2018-12-31 | End: 2019-02-04 | Stop reason: SDUPTHER

## 2018-12-31 RX ORDER — ATORVASTATIN CALCIUM 20 MG/1
TABLET, FILM COATED ORAL
Qty: 30 TABLET | Refills: 1 | Status: SHIPPED | OUTPATIENT
Start: 2018-12-31 | End: 2019-02-04 | Stop reason: SDUPTHER

## 2018-12-31 RX ORDER — FLUOXETINE HYDROCHLORIDE 20 MG/1
CAPSULE ORAL
Qty: 60 CAPSULE | Refills: 0 | Status: SHIPPED | OUTPATIENT
Start: 2018-12-31 | End: 2019-02-04 | Stop reason: SDUPTHER

## 2019-01-18 ENCOUNTER — TELEPHONE (OUTPATIENT)
Dept: GASTROENTEROLOGY | Facility: CLINIC | Age: 59
End: 2019-01-18

## 2019-01-18 NOTE — TELEPHONE ENCOUNTER
Mailed EGD paperwork to patient on 01/18/19.  Patient stated February 12 would be a good day for her to have this done.

## 2019-01-21 ENCOUNTER — PREP FOR SURGERY (OUTPATIENT)
Dept: OTHER | Facility: HOSPITAL | Age: 59
End: 2019-01-21

## 2019-01-21 DIAGNOSIS — R11.0 NAUSEA: Primary | ICD-10-CM

## 2019-01-21 DIAGNOSIS — R12 HEARTBURN: ICD-10-CM

## 2019-01-21 DIAGNOSIS — R10.33 PERIUMBILICAL ABDOMINAL PAIN: ICD-10-CM

## 2019-01-21 DIAGNOSIS — R13.10 DYSPHAGIA: ICD-10-CM

## 2019-01-21 DIAGNOSIS — R07.9 CHEST PAIN: ICD-10-CM

## 2019-01-21 RX ORDER — SODIUM CHLORIDE 9 MG/ML
70 INJECTION, SOLUTION INTRAVENOUS CONTINUOUS PRN
Status: CANCELLED | OUTPATIENT
Start: 2019-01-21

## 2019-01-30 PROBLEM — R07.9 CHEST PAIN: Status: ACTIVE | Noted: 2019-01-30

## 2019-01-30 PROBLEM — R10.33 PERIUMBILICAL ABDOMINAL PAIN: Status: ACTIVE | Noted: 2019-01-30

## 2019-01-30 PROBLEM — R11.0 NAUSEA: Status: ACTIVE | Noted: 2019-01-30

## 2019-01-30 PROBLEM — R13.10 DYSPHAGIA: Status: ACTIVE | Noted: 2019-01-30

## 2019-01-30 PROBLEM — R12 HEARTBURN: Status: ACTIVE | Noted: 2019-01-30

## 2019-02-04 ENCOUNTER — OFFICE VISIT (OUTPATIENT)
Dept: INTERNAL MEDICINE | Facility: CLINIC | Age: 59
End: 2019-02-04

## 2019-02-04 VITALS
HEART RATE: 68 BPM | WEIGHT: 116 LBS | DIASTOLIC BLOOD PRESSURE: 74 MMHG | BODY MASS INDEX: 23.39 KG/M2 | HEIGHT: 59 IN | SYSTOLIC BLOOD PRESSURE: 120 MMHG

## 2019-02-04 DIAGNOSIS — G47.09 OTHER INSOMNIA: ICD-10-CM

## 2019-02-04 DIAGNOSIS — J30.9 ALLERGIC RHINITIS, UNSPECIFIED SEASONALITY, UNSPECIFIED TRIGGER: ICD-10-CM

## 2019-02-04 DIAGNOSIS — E78.2 MIXED HYPERLIPIDEMIA: Primary | ICD-10-CM

## 2019-02-04 DIAGNOSIS — M81.0 OSTEOPOROSIS, UNSPECIFIED OSTEOPOROSIS TYPE, UNSPECIFIED PATHOLOGICAL FRACTURE PRESENCE: ICD-10-CM

## 2019-02-04 DIAGNOSIS — F32.1 CURRENT MODERATE EPISODE OF MAJOR DEPRESSIVE DISORDER, UNSPECIFIED WHETHER RECURRENT (HCC): ICD-10-CM

## 2019-02-04 DIAGNOSIS — K21.9 GASTROESOPHAGEAL REFLUX DISEASE WITHOUT ESOPHAGITIS: ICD-10-CM

## 2019-02-04 DIAGNOSIS — M15.9 PRIMARY OSTEOARTHRITIS INVOLVING MULTIPLE JOINTS: ICD-10-CM

## 2019-02-04 DIAGNOSIS — R13.10 DYSPHAGIA, UNSPECIFIED TYPE: ICD-10-CM

## 2019-02-04 DIAGNOSIS — F41.1 ANXIETY STATE: ICD-10-CM

## 2019-02-04 PROBLEM — R10.33 PERIUMBILICAL ABDOMINAL PAIN: Status: RESOLVED | Noted: 2019-01-30 | Resolved: 2019-02-04

## 2019-02-04 PROBLEM — R07.9 CHEST PAIN: Status: RESOLVED | Noted: 2019-01-30 | Resolved: 2019-02-04

## 2019-02-04 PROBLEM — Z13.220 SCREENING FOR LIPID DISORDERS: Status: RESOLVED | Noted: 2018-10-01 | Resolved: 2019-02-04

## 2019-02-04 LAB
ALBUMIN SERPL-MCNC: 4.54 G/DL (ref 3.2–4.8)
ALBUMIN/GLOB SERPL: 2.4 G/DL (ref 1.5–2.5)
ALP SERPL-CCNC: 44 U/L (ref 25–100)
ALT SERPL-CCNC: 46 U/L (ref 7–40)
AST SERPL-CCNC: 35 U/L (ref 0–33)
BILIRUB SERPL-MCNC: 0.5 MG/DL (ref 0.3–1.2)
BUN SERPL-MCNC: 15 MG/DL (ref 9–23)
BUN/CREAT SERPL: 18.1 (ref 7–25)
CALCIUM SERPL-MCNC: 9.2 MG/DL (ref 8.7–10.4)
CHLORIDE SERPL-SCNC: 104 MMOL/L (ref 99–109)
CHOLEST SERPL-MCNC: 146 MG/DL (ref 0–200)
CO2 SERPL-SCNC: 28 MMOL/L (ref 20–31)
CREAT SERPL-MCNC: 0.83 MG/DL (ref 0.6–1.3)
GLOBULIN SER CALC-MCNC: 1.9 GM/DL
GLUCOSE SERPL-MCNC: 95 MG/DL (ref 70–100)
HDLC SERPL-MCNC: 79 MG/DL (ref 40–60)
LDLC SERPL CALC-MCNC: 57 MG/DL (ref 0–100)
POTASSIUM SERPL-SCNC: 4.3 MMOL/L (ref 3.5–5.5)
PROT SERPL-MCNC: 6.4 G/DL (ref 5.7–8.2)
SODIUM SERPL-SCNC: 137 MMOL/L (ref 132–146)
TRIGL SERPL-MCNC: 50 MG/DL (ref 0–150)
TSH SERPL DL<=0.005 MIU/L-ACNC: 1.65 MIU/ML (ref 0.35–5.35)
VLDLC SERPL CALC-MCNC: 10 MG/DL

## 2019-02-04 PROCEDURE — 99214 OFFICE O/P EST MOD 30 MIN: CPT | Performed by: INTERNAL MEDICINE

## 2019-02-04 RX ORDER — CETIRIZINE HYDROCHLORIDE 10 MG/1
10 TABLET ORAL DAILY
Qty: 30 TABLET | Refills: 11 | Status: SHIPPED | OUTPATIENT
Start: 2019-02-04 | End: 2019-03-26

## 2019-02-04 RX ORDER — ATORVASTATIN CALCIUM 20 MG/1
20 TABLET, FILM COATED ORAL NIGHTLY
Qty: 30 TABLET | Refills: 11 | Status: SHIPPED | OUTPATIENT
Start: 2019-02-04 | End: 2020-02-05

## 2019-02-04 RX ORDER — FLUOXETINE HYDROCHLORIDE 20 MG/1
40 CAPSULE ORAL DAILY
Qty: 60 CAPSULE | Refills: 11 | Status: SHIPPED | OUTPATIENT
Start: 2019-02-04 | End: 2020-02-05

## 2019-02-04 RX ORDER — CELECOXIB 200 MG/1
200 CAPSULE ORAL DAILY PRN
Qty: 30 CAPSULE | Refills: 2 | Status: SHIPPED | OUTPATIENT
Start: 2019-02-04 | End: 2019-03-28

## 2019-02-04 NOTE — PROGRESS NOTES
Patient is a 59 y.o. female who is here for a follow up of chronic conditions.  Chief Complaint   Patient presents with   • Hyperlipidemia         HPI:    Here for f/u.  Will be getting scope done soon, scheduled on 2/12.  Feels better on Wellbutrin.  Occasional nausea.  Sleeping good.  No fever or chills.   Has been abstinent from tobacco.      History:    Patient Active Problem List   Diagnosis   • Allergic rhinitis   • Anxiety state   • Hyperlipidemia   • Osteoporosis   • Gastroesophageal reflux disease without esophagitis   • Other insomnia   • Depressed   • Nausea   • Dysphagia   • Heartburn       Past Medical History:   Diagnosis Date   • Anemia    • Depression    • Elevated LFTs    • Exposure to TB     never tested +   • Hyperlipidemia    • Plantar fasciitis    • Vertigo        Past Surgical History:   Procedure Laterality Date   • ABDOMINOPLASTY  2003   • AUGMENTATION MAMMAPLASTY         Current Outpatient Medications on File Prior to Visit   Medication Sig   • albuterol sulfate  (90 Base) MCG/ACT inhaler Inhale 2 puffs Every 4 (Four) Hours As Needed for Wheezing.   • alendronate (FOSAMAX) 10 MG tablet Take 10 mg by mouth Daily.   • benzonatate (TESSALON) 200 MG capsule Take 1 capsule by mouth 3 (Three) Times a Day As Needed for Cough.   • buPROPion XL (WELLBUTRIN XL) 150 MG 24 hr tablet Take 1 tablet by mouth Every Morning.   • CALCITRATE 950 MG tablet    • dicyclomine (BENTYL) 20 MG tablet take 1 tablet by mouth four times a day if needed   • fluticasone (FLONASE) 50 MCG/ACT nasal spray instill 2 sprays into each nostril once daily   • fluticasone (FLOVENT HFA) 220 MCG/ACT inhaler 2-4 puffs bid   • nicotine polacrilex ([employer] NICOTINE) 2 MG gum Chew 1 each As Needed for Smoking Cessation.   • RA ASPIRIN EC 81 MG EC tablet take 1 tablet by mouth once daily   • RA VITAMIN D-3 2000 units capsule take 1 capsule by mouth once daily   • tiZANidine (ZANAFLEX) 4 MG tablet Take 4 mg by mouth every night at  bedtime.   • traZODone (DESYREL) 50 MG tablet Take 1 tablet by mouth At Night As Needed for Sleep.   • [DISCONTINUED] atorvastatin (LIPITOR) 20 MG tablet take 1 tablet by mouth once daily   • [DISCONTINUED] cetirizine (zyrTEC) 10 MG tablet take 1 tablet by mouth once daily   • [DISCONTINUED] diclofenac (VOLTAREN) 1 % gel gel Apply 4 g topically to the appropriate area as directed 4 (Four) Times a Day.   • [DISCONTINUED] etodolac (LODINE) 200 MG capsule Take 1 capsule by mouth Every 8 (Eight) Hours.   • [DISCONTINUED] FLUoxetine (PROzac) 20 MG capsule take 2 capsules by mouth once daily   • [DISCONTINUED] azithromycin (ZITHROMAX) 250 MG tablet Take 2 tablets the first day, then 1 tablet daily for 4 days. (Patient taking differently: Take 2 tablets the first day, then 1 tablet daily for 4 days.)   • [DISCONTINUED] MethylPREDNISolone (MEDROL, ANNY,) 4 MG tablet Take as directed on package instructions. (Patient taking differently: Take as directed on package instructions.)     No current facility-administered medications on file prior to visit.        Family History   Problem Relation Age of Onset   • Breast cancer Maternal Aunt    • Lung cancer Mother    • Heart attack Father    • Crohn's disease Sister    • Stomach cancer Paternal Grandfather    • Alcohol abuse Paternal Grandfather    • Colon cancer Neg Hx    • Cirrhosis Neg Hx    • Liver disease Neg Hx    • Liver cancer Neg Hx    • Ulcerative colitis Neg Hx    • Esophageal cancer Neg Hx        Social History     Socioeconomic History   • Marital status: Single     Spouse name: Not on file   • Number of children: Not on file   • Years of education: Not on file   • Highest education level: Not on file   Social Needs   • Financial resource strain: Not on file   • Food insecurity - worry: Not on file   • Food insecurity - inability: Not on file   • Transportation needs - medical: Not on file   • Transportation needs - non-medical: Not on file   Occupational History   •  "Not on file   Tobacco Use   • Smoking status: Current Every Day Smoker     Packs/day: 0.25     Types: Cigarettes     Start date: 2012   • Smokeless tobacco: Never Used   • Tobacco comment: Smoked as young adult, but quit with pregnancies   Substance and Sexual Activity   • Alcohol use: No     Comment: 'Former use\"   • Drug use: No   • Sexual activity: Defer   Other Topics Concern   • Not on file   Social History Narrative   • Not on file         Review of Systems   Constitutional: Negative for chills, fatigue and fever.   HENT: Negative for congestion, ear pain, hearing loss, rhinorrhea, sinus pressure, sore throat and trouble swallowing.    Eyes: Negative for discharge and itching.   Respiratory: Negative for cough, chest tightness and shortness of breath.    Cardiovascular: Negative for chest pain, palpitations and leg swelling.        Summer 2018 cardiac w/u neg   Gastrointestinal: Negative for abdominal pain, blood in stool, constipation, diarrhea and vomiting.        2/16 colonoscopy normal   Endocrine: Negative for polydipsia and polyuria.   Genitourinary: Negative for difficulty urinating, dysuria, enuresis, frequency, hematuria and urgency.        7/18 mammogram   Musculoskeletal: Positive for arthralgias. Negative for back pain, gait problem and joint swelling.   Skin: Negative for rash and wound.   Allergic/Immunologic: Negative for immunocompromised state.   Neurological: Negative for dizziness, syncope, weakness, light-headedness, numbness and headaches.   Hematological: Does not bruise/bleed easily.   Psychiatric/Behavioral: Positive for behavioral problems, dysphoric mood and sleep disturbance. The patient is not nervous/anxious.        /74   Pulse 68   Ht 149 cm (58.66\")   Wt 52.6 kg (116 lb)   LMP  (LMP Unknown)   BMI 23.70 kg/m²       Physical Exam   Constitutional: She is oriented to person, place, and time. She appears well-developed and well-nourished.   HENT:   Head: Normocephalic and " atraumatic.   Right Ear: External ear normal.   Left Ear: External ear normal.   Mouth/Throat: Oropharynx is clear and moist.   Eyes: Conjunctivae and EOM are normal.   Neck: Normal range of motion. Neck supple.   Cardiovascular: Normal rate, regular rhythm and normal heart sounds.   Pulmonary/Chest: Effort normal and breath sounds normal.   Abdominal: Soft. Bowel sounds are normal.   Musculoskeletal: Normal range of motion.   Lymphadenopathy:     She has no cervical adenopathy.   Neurological: She is alert and oriented to person, place, and time.   Skin: Skin is warm and dry.   Psychiatric: She has a normal mood and affect. Her behavior is normal. Thought content normal.       Procedure:      Discussion/Summary:    GERD-will get EGD   Osteoporosis-cont fosamax and will check Vit D  Hyperlipidemia-labs noted  Insomnia-cont trazodone  Depression-add Wellbutrin  Elevated LFTs-recheck on rtc     Reviewed records available  Labs noted and dw patient        Current Outpatient Medications:   •  albuterol sulfate  (90 Base) MCG/ACT inhaler, Inhale 2 puffs Every 4 (Four) Hours As Needed for Wheezing., Disp: 1 inhaler, Rfl: 0  •  alendronate (FOSAMAX) 10 MG tablet, Take 10 mg by mouth Daily., Disp: , Rfl: 0  •  atorvastatin (LIPITOR) 20 MG tablet, Take 1 tablet by mouth Every Night., Disp: 30 tablet, Rfl: 11  •  benzonatate (TESSALON) 200 MG capsule, Take 1 capsule by mouth 3 (Three) Times a Day As Needed for Cough., Disp: 30 capsule, Rfl: 0  •  buPROPion XL (WELLBUTRIN XL) 150 MG 24 hr tablet, Take 1 tablet by mouth Every Morning., Disp: 30 tablet, Rfl: 5  •  CALCITRATE 950 MG tablet, , Disp: , Rfl: 0  •  cetirizine (zyrTEC) 10 MG tablet, Take 1 tablet by mouth Daily., Disp: 30 tablet, Rfl: 11  •  diclofenac (VOLTAREN) 1 % gel gel, Apply 4 g topically to the appropriate area as directed 4 (Four) Times a Day., Disp: 100 g, Rfl: 1  •  dicyclomine (BENTYL) 20 MG tablet, take 1 tablet by mouth four times a day if needed,  Disp: , Rfl: 0  •  FLUoxetine (PROzac) 20 MG capsule, Take 2 capsules by mouth Daily., Disp: 60 capsule, Rfl: 11  •  fluticasone (FLONASE) 50 MCG/ACT nasal spray, instill 2 sprays into each nostril once daily, Disp: , Rfl: 0  •  fluticasone (FLOVENT HFA) 220 MCG/ACT inhaler, 2-4 puffs bid, Disp: 1 inhaler, Rfl: 0  •  nicotine polacrilex ([employer] NICOTINE) 2 MG gum, Chew 1 each As Needed for Smoking Cessation., Disp: 50 each, Rfl: 2  •  RA ASPIRIN EC 81 MG EC tablet, take 1 tablet by mouth once daily, Disp: 30 tablet, Rfl: 2  •  RA VITAMIN D-3 2000 units capsule, take 1 capsule by mouth once daily, Disp: 30 each, Rfl: 2  •  tiZANidine (ZANAFLEX) 4 MG tablet, Take 4 mg by mouth every night at bedtime., Disp: , Rfl: 0  •  traZODone (DESYREL) 50 MG tablet, Take 1 tablet by mouth At Night As Needed for Sleep., Disp: 30 tablet, Rfl: 5  •  celecoxib (CeleBREX) 200 MG capsule, Take 1 capsule by mouth Daily As Needed for Moderate Pain ., Disp: 30 capsule, Rfl: 2        Areli was seen today for hyperlipidemia.    Diagnoses and all orders for this visit:    Mixed hyperlipidemia  -     Comprehensive Metabolic Panel  -     Lipid Panel  -     TSH  -     atorvastatin (LIPITOR) 20 MG tablet; Take 1 tablet by mouth Every Night.    Allergic rhinitis, unspecified seasonality, unspecified trigger  -     cetirizine (zyrTEC) 10 MG tablet; Take 1 tablet by mouth Daily.    Gastroesophageal reflux disease without esophagitis    Dysphagia, unspecified type    Osteoporosis, unspecified osteoporosis type, unspecified pathological fracture presence    Other insomnia    Current moderate episode of major depressive disorder, unspecified whether recurrent (CMS/HCC)  -     FLUoxetine (PROzac) 20 MG capsule; Take 2 capsules by mouth Daily.    Anxiety state    Primary osteoarthritis involving multiple joints  -     diclofenac (VOLTAREN) 1 % gel gel; Apply 4 g topically to the appropriate area as directed 4 (Four) Times a Day.  -     celecoxib (CeleBREX)  200 MG capsule; Take 1 capsule by mouth Daily As Needed for Moderate Pain .

## 2019-02-05 PROBLEM — R10.33 PERIUMBILICAL ABDOMINAL PAIN: Status: ACTIVE | Noted: 2019-01-30

## 2019-02-05 PROBLEM — R07.9 CHEST PAIN: Status: ACTIVE | Noted: 2019-01-30

## 2019-02-11 RX ORDER — ASPIRIN 81 MG/1
81 TABLET ORAL DAILY
COMMUNITY
End: 2019-03-26

## 2019-02-11 RX ORDER — MULTIVITAMIN WITH IRON
250 TABLET ORAL DAILY
COMMUNITY
End: 2021-08-24 | Stop reason: HOSPADM

## 2019-02-11 RX ORDER — BIOTIN 1 MG
1000 TABLET ORAL DAILY
COMMUNITY
End: 2019-07-23

## 2019-02-11 RX ORDER — MULTIPLE VITAMINS W/ MINERALS TAB 9MG-400MCG
1 TAB ORAL DAILY
COMMUNITY
End: 2021-06-03

## 2019-02-11 RX ORDER — FOLIC ACID/MULTIVIT,IRON,MINER 0.4MG-18MG
350 TABLET ORAL DAILY
COMMUNITY
End: 2019-03-28 | Stop reason: ALTCHOICE

## 2019-02-12 ENCOUNTER — ANESTHESIA (OUTPATIENT)
Dept: GASTROENTEROLOGY | Facility: HOSPITAL | Age: 59
End: 2019-02-12

## 2019-02-12 ENCOUNTER — HOSPITAL ENCOUNTER (OUTPATIENT)
Facility: HOSPITAL | Age: 59
Setting detail: HOSPITAL OUTPATIENT SURGERY
Discharge: HOME | End: 2019-02-12
Attending: INTERNAL MEDICINE | Admitting: INTERNAL MEDICINE

## 2019-02-12 ENCOUNTER — ANESTHESIA EVENT (OUTPATIENT)
Dept: GASTROENTEROLOGY | Facility: HOSPITAL | Age: 59
End: 2019-02-12

## 2019-02-12 VITALS
BODY MASS INDEX: 23.59 KG/M2 | RESPIRATION RATE: 18 BRPM | OXYGEN SATURATION: 96 % | DIASTOLIC BLOOD PRESSURE: 67 MMHG | HEIGHT: 59 IN | WEIGHT: 117 LBS | HEART RATE: 75 BPM | TEMPERATURE: 97.3 F | SYSTOLIC BLOOD PRESSURE: 108 MMHG

## 2019-02-12 DIAGNOSIS — R12 HEARTBURN: ICD-10-CM

## 2019-02-12 DIAGNOSIS — R07.9 CHEST PAIN: ICD-10-CM

## 2019-02-12 DIAGNOSIS — R11.0 NAUSEA: ICD-10-CM

## 2019-02-12 DIAGNOSIS — R13.10 DYSPHAGIA: ICD-10-CM

## 2019-02-12 DIAGNOSIS — R10.33 PERIUMBILICAL ABDOMINAL PAIN: ICD-10-CM

## 2019-02-12 PROCEDURE — S0260 H&P FOR SURGERY: HCPCS | Performed by: INTERNAL MEDICINE

## 2019-02-12 PROCEDURE — 25010000002 PROPOFOL 10 MG/ML EMULSION: Performed by: NURSE ANESTHETIST, CERTIFIED REGISTERED

## 2019-02-12 PROCEDURE — 43249 ESOPH EGD DILATION <30 MM: CPT | Performed by: INTERNAL MEDICINE

## 2019-02-12 PROCEDURE — C1726 CATH, BAL DIL, NON-VASCULAR: HCPCS | Performed by: INTERNAL MEDICINE

## 2019-02-12 PROCEDURE — 43239 EGD BIOPSY SINGLE/MULTIPLE: CPT | Performed by: INTERNAL MEDICINE

## 2019-02-12 RX ORDER — PANTOPRAZOLE SODIUM 40 MG/1
TABLET, DELAYED RELEASE ORAL
Qty: 30 TABLET | Refills: 2 | Status: SHIPPED | OUTPATIENT
Start: 2019-02-12 | End: 2019-03-28 | Stop reason: SDUPTHER

## 2019-02-12 RX ORDER — SODIUM CHLORIDE, SODIUM LACTATE, POTASSIUM CHLORIDE, CALCIUM CHLORIDE 600; 310; 30; 20 MG/100ML; MG/100ML; MG/100ML; MG/100ML
1000 INJECTION, SOLUTION INTRAVENOUS CONTINUOUS
Status: CANCELLED | OUTPATIENT
Start: 2019-02-12

## 2019-02-12 RX ORDER — PROPOFOL 10 MG/ML
VIAL (ML) INTRAVENOUS AS NEEDED
Status: DISCONTINUED | OUTPATIENT
Start: 2019-02-12 | End: 2019-02-12 | Stop reason: SURG

## 2019-02-12 RX ORDER — ONDANSETRON 2 MG/ML
4 INJECTION INTRAMUSCULAR; INTRAVENOUS ONCE AS NEEDED
Status: CANCELLED | OUTPATIENT
Start: 2019-02-12 | End: 2019-02-12

## 2019-02-12 RX ORDER — SODIUM CHLORIDE 0.9 % (FLUSH) 0.9 %
3 SYRINGE (ML) INJECTION AS NEEDED
Status: DISCONTINUED | OUTPATIENT
Start: 2019-02-12 | End: 2019-02-12 | Stop reason: HOSPADM

## 2019-02-12 RX ORDER — SODIUM CHLORIDE 9 MG/ML
70 INJECTION, SOLUTION INTRAVENOUS CONTINUOUS PRN
Status: DISCONTINUED | OUTPATIENT
Start: 2019-02-12 | End: 2019-02-12 | Stop reason: HOSPADM

## 2019-02-12 RX ORDER — LIDOCAINE HYDROCHLORIDE 20 MG/ML
INJECTION, SOLUTION INFILTRATION; PERINEURAL AS NEEDED
Status: DISCONTINUED | OUTPATIENT
Start: 2019-02-12 | End: 2019-02-12 | Stop reason: SURG

## 2019-02-12 RX ORDER — PANTOPRAZOLE SODIUM 40 MG/10ML
40 INJECTION, POWDER, LYOPHILIZED, FOR SOLUTION INTRAVENOUS ONCE
Status: COMPLETED | OUTPATIENT
Start: 2019-02-12 | End: 2019-02-12

## 2019-02-12 RX ADMIN — PANTOPRAZOLE SODIUM 40 MG: 40 INJECTION, POWDER, FOR SOLUTION INTRAVENOUS at 11:11

## 2019-02-12 RX ADMIN — LIDOCAINE HYDROCHLORIDE 100 MG: 20 INJECTION, SOLUTION INFILTRATION; PERINEURAL at 10:09

## 2019-02-12 RX ADMIN — SODIUM CHLORIDE 70 ML/HR: 9 INJECTION, SOLUTION INTRAVENOUS at 08:25

## 2019-02-12 RX ADMIN — PROPOFOL 400 MG: 10 INJECTION, EMULSION INTRAVENOUS at 10:32

## 2019-02-12 NOTE — ANESTHESIA POSTPROCEDURE EVALUATION
Patient: Areli Barnett    Procedure Summary     Date:  02/12/19 Room / Location:  [facility] ENDOSCOPY 2 / [facility] ENDOSCOPY    Anesthesia Start:  1005 Anesthesia Stop:  1034    Procedure:  ESOPHAGOGASTRODUODENOSCOPY with cold biopsy and esophageal dilation (N/A Esophagus) Diagnosis:       Nausea      Dysphagia      Periumbilical abdominal pain      Chest pain      Heartburn      (Nausea [R11.0])      (Dysphagia [R13.10])      (Periumbilical abdominal pain [R10.33])      (Chest pain [R07.9])      (Heartburn [R12])    Surgeon:  Brenden Steward MD Provider:  Ismael Thomas CRNA    Anesthesia Type:  MAC ASA Status:  2          Anesthesia Type: MAC  Last vitals  BP   95/66 (02/12/19 1045)   Temp   97.3 °F (36.3 °C) (02/12/19 1045)   Pulse   87 (02/12/19 1045)   Resp   18 (02/12/19 1045)     SpO2   94 % (02/12/19 1045)     Post Anesthesia Care and Evaluation    Patient location during evaluation: PHASE II  Patient participation: complete - patient participated  Level of consciousness: awake  Pain score: 1  Pain management: adequate  Airway patency: patent  Anesthetic complications: No anesthetic complications  PONV Status: controlled  Cardiovascular status: acceptable and stable  Respiratory status: acceptable  Hydration status: acceptable

## 2019-02-12 NOTE — OP NOTE
PROCEDURE:  Upper Endoscopy with serial dilation of the distal esophagus using 15-16.5-18 mm CRE balloon to 18 mm and biopsies.    DATE OF PROCEDURE: February 12, 2019.    REFERRING PROVIDER:  Dawson Proctor MD.     INSTRUMENT:    Olympus GIF H180 J video endoscope.  Olympus GIF H 190 video endoscope     INDICATIONS OF THE PROCEDURE:         BIOPSIES: Second portion of duodenum.  Polypoid area in the second portion of duodenum. Gastric antrum, body and fundus.  Biopsies were obtained from the gastric antral ulcer edges. Mid and distal esophagus. Biopsies were obtained from the proximal esophagus-changes suggestive of an inlet patches.      MEDICATIONS:  MAC.     PHOTOGRAPHS:  Photographs were included in the medical records.     CONSENT/PREPROCEDURE EVALUATION:  Risks, benefits, alternatives and options of the procedure including risks of anesthesia/sedation were discussed and informed consent was obtained prior to the procedure. History and physical examination were performed and nothing precluded the test.     REPORT:  The patient was placed in left lateral decubitus position. Once under the influence of IV sedation, the instrument was inserted into the mouth and esophagus was intubated under direct vision without difficulty.     Esophagus:  Localized areas of gastric type mucosa was seen in the proximal esophagus consistent with inlet patches. Biopsies were obtained. A nonobstructive ring was noted within the proximal esophagus. Multiple concentric rings were seen within the mid and distal esophagus. Biopsies were obtained. Erosive distal esophagitis. LA class A.  Early stricturing of the distal esophagus was noted.  Z line was noted to be around 35 cm.   A small sliding hiatal hernia less than 3 cm was noted.  No Stinson's esophagus was seen.     Stomach:  Antrum:  Erythematous-erosive  gastritis.   0.75 cm clean base ulcer was noted at the gastric antrum. Biopsies were obtained from the edges. Angulus,  lesser and greater curves: Normal.  Retroflex examination: Sliding hiatal hernia.  Cardia and fundus:  Normal.     Body of the stomach: Erythematous gastritis.  Good distensibility of the stomach was achieved no giant folds were noted.   Biopsies were obtained from the gastric antrum,  body and fundus.    Pylorus and pyloric channel:  normal.     Duodenum:  Bulb:Erythematous bulbar duodenitis with scant erosions.  Second portion:Erythematous duodenitis involving the second portion with scant erosions. Polypoid area was noted in the second portion of duodenum. Biopsies were obtained..  No scalloping was seen in the second portion of duodenum.  Biopsies were obtained from the second portion of duodenum.    Intervention:  Distal esophagus was dilated using 15-16.5-18 mm CRE balloon to 18 mm under vision without difficulty.  Some blood was noted no active bleeding was seen.     The upper GI tract was decompressed and the scope was pulled out of the patient. The patient tolerated the procedure well.     DIAGNOSES:     1. Distal esophageal stricture. Status post serial dilation to 18 mm.   2. Erosive distal esophagitis. LA class A.  3. Small sliding hiatal hernia less than 3 cm.  4. Erythematous-erosive gastritis.  5. 0.75 cm clean base gastric antral ulcer. Risks of bleeding from clean base gastric ulcer or less than 5%.  6. Erythematous-erosive bulbar duodenitis as well as proximal second portion of duodenum.   7. Localized areas of gastric type mucosa within the proximal esophagus consistent with inlet patches.  8. Proximal esophageal ring. Nonobstructive.  9. Subtle concentric rings within the mid and distal esophagus.       RECOMMENDATIONS:  1.  Dietary instructions.  2.  Pantoprazole 40 mg 1 p.o. q.a.m. 1/2 hour before breakfast.  3.  Follow biopsies.  4.  Follow up in office.  5. Discontinue Fosamax.     Thank you very much for letting me participate in the care of this patient. Please do not hesitate to call me if  you have any questions.

## 2019-02-12 NOTE — ANESTHESIA PREPROCEDURE EVALUATION
Anesthesia Evaluation     Patient summary reviewed and Nursing notes reviewed   no history of anesthetic complications:  NPO Solid Status: > 8 hours  NPO Liquid Status: > 8 hours           Airway   Mallampati: I  TM distance: >3 FB  Neck ROM: full  no difficulty expected  Dental - normal exam     Pulmonary - normal exam   (+) a smoker Former,     ROS comment: Quit smoking 6 weeks ago  Cardiovascular - normal exam    Rhythm: regular  Rate: normal    (+) valvular problems/murmurs murmur, hyperlipidemia,     ROS comment: Echo 06/2018  Echocardiogram Findings     Left Ventricle Left ventricular systolic function is normal. Estimated EF appears to be in the range of 66 - 70%. Estimated EF = 68%. Normal left ventricular cavity size and wall thickness noted. All left ventricular wall segments contract normally. Left ventricular diastolic function is normal. Normal left atrial pressure. There is no evidence of a left ventricular mass or thrombus present.  Right Ventricle Normal right ventricular cavity size, wall thickness, systolic function and septal motion noted. No evidence of a right ventricular thrombus present. No evidence of a right ventricular mass present.  Left Atrium Normal left atrial size and volume noted. No evidence of a left atrial thrombus present. No evidence of a left atrial mass present. appear normal with no flow abnormalities.  Right Atrium The inferior vena cava is normally sized. Normal IVC inspiratory collapse of greater than 50% noted. Normal IVC flow pattern noted. The superior vena cava is normaly sized. No evidence of a right atrial thrombus present. No evidence of a right atrial mass present.  Aortic Valve The aortic valve is structurally normal. The valve appears trileaflet. No aortic valve regurgitation is present. No aortic valve stenosis is present.  Mitral Valve The mitral valve is normal in structure. No mitral valve regurgitation is present. No significant mitral valve stenosis is  present.  Tricuspid Valve The tricuspid valve is normal. No tricuspid valve stenosis is present. Physiologic tricuspid valve regurgitation is present. Estimated right ventricular systolic pressure from tricuspid regurgitation is normal (<35 mmHg). No evidence of pulmonary hypertension is present.  Pulmonic Valve The pulmonic valve is structurally normal. There is no significant pulmonic valve stenosis present. There is no pulmonic valve regurgitation present.  Greater Vessels No dilation of the aortic root is present. No dilation of the sinuses of Valsalva is present.  Pericardium The pericardium is normal. There is no evidence of pericardial effusion.        Neuro/Psych  (+) dizziness/light headedness,     GI/Hepatic/Renal/Endo    (+)  GERD,      Musculoskeletal     Abdominal  - normal exam    Abdomen: soft.  Bowel sounds: normal.   Substance History   (+) alcohol use,      OB/GYN negative ob/gyn ROS         Other   (+) arthritis                     Anesthesia Plan    ASA 2     MAC   (Risks and benefits discussed including risk of aspiration, recall and dental damage. All patient questions answered. Will continue with POC.)  intravenous induction   Anesthetic plan, all risks, benefits, and alternatives have been provided, discussed and informed consent has been obtained with: patient.

## 2019-02-12 NOTE — H&P
"Chief complaint:  Reflux. Nausea. Atypical chest pains.    History of present illness:   The patient had undergone cardiac workup including a stress test in June 2018 which was negative.    Past medical history:   Past Medical History:   Diagnosis Date   • Anemia    • Arthritis    • Body piercing     EARS   • Depression    • Dysphagia     REPORTS SOMETIMES SHE HAS PAIN WHEN SWALLOWING FOOD   • Elevated cholesterol    • Elevated LFTs    • Epigastric pain    • Exposure to TB     REPORTS MANY YEARS AGO. REPORTS TB TESTING HAS ALWAYS BEEN NEGATIVE.    • GERD (gastroesophageal reflux disease)    • History of bronchitis 01/2019   • History of chest pain     REPORTS FALL 2018 AND THAT SHE HAD TESTING THAT WAS WNL'S. REPORTS SHE IS NOW BEING CHECKED FOR GI.    • History of exercise stress test     2018 - REPORTS WAS TOLD ALL WNL'S    • History of fracture     TOE   • Hyperlipidemia    • Latex allergy    • Murmur     REPORTS \"A SLIGHT MURMUR\"   • Osteoporosis    • Plantar fasciitis    • Seasonal allergies    • Vertigo    • Wears glasses     PRN       Surgical history:    Past Surgical History:   Procedure Laterality Date   • ABDOMINOPLASTY  2003   • AUGMENTATION MAMMAPLASTY Bilateral    • COLONOSCOPY     • WISDOM TOOTH EXTRACTION         Social history:  Social History     Socioeconomic History   • Marital status: Single     Spouse name: Not on file   • Number of children: Not on file   • Years of education: Not on file   • Highest education level: Not on file   Social Needs   • Financial resource strain: Not on file   • Food insecurity - worry: Not on file   • Food insecurity - inability: Not on file   • Transportation needs - medical: Not on file   • Transportation needs - non-medical: Not on file   Occupational History   • Not on file   Tobacco Use   • Smoking status: Former Smoker     Packs/day: 0.25     Years: 5.00     Pack years: 1.25     Types: Cigarettes     Last attempt to quit: 2/4/2019     Years since quitting: " 0.0   • Smokeless tobacco: Never Used   • Tobacco comment: USING NICOTINE GUM   Substance and Sexual Activity   • Alcohol use: Yes     Comment: SOCIAL USE, NO HISTORY OF ABUSE REPORTED   • Drug use: No   • Sexual activity: Defer   Other Topics Concern   • Not on file   Social History Narrative   • Not on file       Allergies:  Latex and Penicillins  Latex allergy: None  Contrast allergy: None    Medications:  Medications Prior to Admission   Medication Sig Dispense Refill Last Dose   • alendronate (FOSAMAX) 10 MG tablet Take 10 mg by mouth Daily.  0 Taking   • aspirin 81 MG EC tablet Take 81 mg by mouth Daily.   2/11/2019 at 0700   • atorvastatin (LIPITOR) 20 MG tablet Take 1 tablet by mouth Every Night. 30 tablet 11 2/11/2019 at 1900   • Biotin 1000 MCG tablet Take 1,000 mcg by mouth Daily.   2/11/2019 at 0700   • buPROPion XL (WELLBUTRIN XL) 150 MG 24 hr tablet Take 1 tablet by mouth Every Morning. 30 tablet 5 2/11/2019 at 0700   • celecoxib (CeleBREX) 200 MG capsule Take 1 capsule by mouth Daily As Needed for Moderate Pain . 30 capsule 2    • cetirizine (zyrTEC) 10 MG tablet Take 1 tablet by mouth Daily. 30 tablet 11 2/11/2019 at 1900   • Cholecalciferol (VITAMIN D3) 1000 units capsule Take 2,000 Units by mouth Daily.   2/11/2019 at 0700   • diclofenac (VOLTAREN) 1 % gel gel Apply 4 g topically to the appropriate area as directed 4 (Four) Times a Day. 100 g 1 2/11/2019 at 1900   • DICLOFENAC SODIUM PO Take 75 mg by mouth 2 (Two) Times a Day.   2/11/2019 at 1900   • FLUoxetine (PROzac) 20 MG capsule Take 2 capsules by mouth Daily. 60 capsule 11 2/11/2019 at 1900   • fluticasone (FLONASE) 50 MCG/ACT nasal spray instill 2 sprays into each nostril once daily  0 2/11/2019 at 0700   • Krill Oil 350 MG capsule Take 350 mg by mouth Daily.   2/11/2019 at 0700   • Magnesium 250 MG tablet Take 500 mg by mouth Daily.   2/11/2019 at 0700   • Multiple Vitamins-Minerals (MULTIVITAMIN WITH MINERALS) tablet tablet Take 1 tablet  "by mouth Daily.   2/11/2019 at 0700   • nicotine polacrilex ([employer] NICOTINE) 2 MG gum Chew 1 each As Needed for Smoking Cessation. 50 each 2 2/11/2019 at 2100   • traZODone (DESYREL) 50 MG tablet Take 1 tablet by mouth At Night As Needed for Sleep. 30 tablet 5 2/11/2019 at 2100   • dicyclomine (BENTYL) 20 MG tablet take 1 tablet by mouth four times a day if needed  0 More than a month at Unknown time       Review of systems:   Constitutional: No recent: Fever, Weight loss,   Respiratory: No recent: SOB, Cough,   Cardiovascular: HO recent: Atypical Chest Pains, No congestive heart failure or arrhythmias.   Neurological: No recent: Seizures, CVA, TIA.   Genitourinary: No recent: Renal Failure, UTI.  Endocrine: No recent: Worsening of diabetes or thyroid disease.  Musculoskeletal: No recent: Joint swelling.  Hem. Oncology: No recent: Anemia or bleeding.  Psychiatric: No recent: Worsening of depression or anxiety.     VITAL SIGNS:    Blood pressure 127/71, pulse 74, temperature 96.8 °F (36 °C), temperature source Tympanic, resp. rate 16, height 149.9 cm (59\"), weight 53.1 kg (117 lb), SpO2 96 %, not currently breastfeeding.    PHYSICAL EXAMINATION:   HEENT: Normal.   Lungs: Clear to auscultation.  Heart: No S3, no murmur.    Abdomen: Soft.  BS+ ND, NT  Extremities: No edema.  No cyanosis.  Neuro: Alert X 3. No focal deficit.    Assessment:     Plan:     Risks/Benefits:  The potential benefits, risk and/or side effects of the procedure and alternatives have been discussed with the patient/authorized representative and questions were answered.    "

## 2019-02-12 NOTE — DISCHARGE INSTRUCTIONS
Please follow all post op instructions and follow up appointment time from your physician's office included in your discharge packet.    Rest today  No pushing, pulling, tugging, heavy lifting, or strenuous activity   No major decision making, driving, or drinking alcoholic beverages for 24 hours due to the sedation you received  Always use good hand hygiene/washing technique  No driving on pain medication.      To assist you in voiding:  Drink plenty of fluids  Listen to running water while attempting to void.    If you are unable to urinate and you have an uncomfortable urge to void or it has been   6 hours since you were discharged, return to the Emergency Room.      ************************************************************************************    Postprocedure instructions:  1. Nothing by mouth until fully alert and as specified below .  2. Bedrest until fully alert.  3. Vital signs as routine.    Diet:   1. Nothing by mouth for 60 minutes, then if no chest pains the patient may have Clear liquids diet (No Sodas) for overnight.  2. May advance to soft diet on February 13, 2019 at 6 AM if no chest pains, Fever or chills, nausea vomiting or bleeding.    Other instructions:  1. The patient may eat in upright position, chew well, take small bites and take medications in upright position.   2. The patient should drink water after 3-4 bites, and liberally with medications.   3. The patient should remain upright for about 10 minutes after eating and taking oral medications.      Blood Thinner and other medications Directions:  Avoid Aspirin & other NSAIDS for 7 days.  Tylenol is okay.    Other instructions:  The patient should avoid medications that have a potential to cause pill esophagitis including potassium pills, tetracycline capsules, iron pills, NSAIDs and bisphosphonates.      Follow-up:    DR. BRIAN CANO in 4 weeks.Office phone #  [phone].      ********************************************************************************************************************************************************    Notes to the patient and the family from Dr. Steward.     Dear patient/family member,    Findings on today's procedure are as follows:    1. Esophagitis. Inflammation of the esophagus.  2. Esophageal rings. Esophageal stricture. These areas were stretched.   3. Inflammation of the stomach. Gastritis.  4. Small sliding hiatal hernia.  5. No cancer.   6. Stomach ulcer.  7. Inflammation in the top portion of the small intestine.    Recommendations:    1. Protonix (Pantoprazole) tablet 40 mg tablet. Take 1 tablet orally in the morning half an hour before eating every day.  2. Other instructions as above.  3. Avoid NSAIDs.  4. Discontinue Fosamax.      Should you have more questions please do not hesitate to talk to the nurse who can call me and let me talk to you.     I hope you feel better.    Brenden Steward M.D., FACP, FACG.

## 2019-02-14 PROBLEM — K29.30 CHRONIC SUPERFICIAL GASTRITIS WITHOUT BLEEDING: Status: ACTIVE | Noted: 2019-02-14

## 2019-02-14 PROBLEM — K21.00 GASTROESOPHAGEAL REFLUX DISEASE WITH ESOPHAGITIS: Status: ACTIVE | Noted: 2018-10-01

## 2019-02-15 LAB
LAB AP CASE REPORT: NORMAL
PATH REPORT.FINAL DX SPEC: NORMAL

## 2019-03-08 ENCOUNTER — TELEPHONE (OUTPATIENT)
Dept: INTERNAL MEDICINE | Facility: CLINIC | Age: 59
End: 2019-03-08

## 2019-03-18 RX ORDER — ACETAMINOPHEN/DIPHENHYDRAMINE 500MG-25MG
TABLET ORAL
Qty: 30 TABLET | Refills: 2 | Status: SHIPPED | OUTPATIENT
Start: 2019-03-18 | End: 2019-03-26

## 2019-03-26 ENCOUNTER — OFFICE VISIT (OUTPATIENT)
Dept: INTERNAL MEDICINE | Facility: CLINIC | Age: 59
End: 2019-03-26

## 2019-03-26 VITALS
RESPIRATION RATE: 16 BRPM | OXYGEN SATURATION: 99 % | BODY MASS INDEX: 24.19 KG/M2 | SYSTOLIC BLOOD PRESSURE: 120 MMHG | DIASTOLIC BLOOD PRESSURE: 70 MMHG | WEIGHT: 120 LBS | HEART RATE: 80 BPM | TEMPERATURE: 98.8 F | HEIGHT: 59 IN

## 2019-03-26 DIAGNOSIS — J30.1 SEASONAL ALLERGIC RHINITIS DUE TO POLLEN: ICD-10-CM

## 2019-03-26 DIAGNOSIS — M47.812 OSTEOARTHRITIS OF CERVICAL SPINE, UNSPECIFIED SPINAL OSTEOARTHRITIS COMPLICATION STATUS: ICD-10-CM

## 2019-03-26 DIAGNOSIS — M67.432 GANGLION CYST OF WRIST, LEFT: ICD-10-CM

## 2019-03-26 DIAGNOSIS — R68.84 JAW PAIN: ICD-10-CM

## 2019-03-26 DIAGNOSIS — M15.9 PRIMARY OSTEOARTHRITIS INVOLVING MULTIPLE JOINTS: Primary | ICD-10-CM

## 2019-03-26 PROCEDURE — 99214 OFFICE O/P EST MOD 30 MIN: CPT | Performed by: NURSE PRACTITIONER

## 2019-03-26 RX ORDER — FEXOFENADINE HCL 180 MG/1
180 TABLET ORAL DAILY
Qty: 30 TABLET | Refills: 5 | Status: SHIPPED | OUTPATIENT
Start: 2019-03-26 | End: 2019-09-20 | Stop reason: SDUPTHER

## 2019-03-26 RX ORDER — ALENDRONATE SODIUM 10 MG/1
TABLET ORAL
Refills: 0 | COMMUNITY
Start: 2019-02-13 | End: 2019-03-26

## 2019-03-26 RX ORDER — CYCLOBENZAPRINE HCL 5 MG
5 TABLET ORAL 3 TIMES DAILY PRN
Qty: 90 TABLET | Refills: 0 | Status: SHIPPED | OUTPATIENT
Start: 2019-03-26 | End: 2019-07-23

## 2019-03-26 RX ORDER — GLUCOSAMINE/CHONDR SU A SOD 750-600 MG
2000 TABLET ORAL DAILY
Refills: 0 | COMMUNITY
Start: 2019-03-14 | End: 2019-05-15 | Stop reason: SDUPTHER

## 2019-03-26 NOTE — PROGRESS NOTES
"Subjective   Areli Barnett is a 59 y.o. female here today for neck pain.    Chief Complaint   Patient presents with   • Neck Pain   • Headache   • Allergies   Areli reports she has been told in the past that she has arthritis of her neck in the past.  She has been having neck pain that radiates up her neck and down her back since having her EGD.  The pain is described as a soreness/ ache.  Feels muscular.  She does have a history of whiplash. She does report having been taken off of her NSAIDS secondary to gastritis/ esophagitis.  She also reports headaches for the past 3 weeks- pain \"jumps around\" along her forehead.    She reports her inner ear canals are very itchy.  She has been taking zyrtec which does seem helpful.      She reports that sometimes her jaw will feel out of line- her jaw was dislocated as child and has had trouble with it \"getting out of line\" occasionally.  She does report she has a broken tooth- has not been back to dentist to have this fixed.    She also reports she has a ganglion cyst on her left wrist.  She has noticed left thumb pain- secondary to this- has had a ganglion cyst on the right wrist before with removal.    Review of Systems   Constitutional: Negative for activity change, appetite change, chills, fatigue and fever.   HENT: Positive for congestion and rhinorrhea. Negative for ear discharge, ear pain, postnasal drip, sinus pressure, sore throat and voice change.         Ear itching   Eyes: Negative for photophobia, redness and itching.   Respiratory: Positive for cough. Negative for shortness of breath and wheezing.    Cardiovascular: Negative for chest pain and leg swelling.   Musculoskeletal: Positive for arthralgias and neck pain. Negative for myalgias.   Skin: Negative for color change and rash.   Allergic/Immunologic: Positive for environmental allergies. Negative for immunocompromised state.   Neurological: Positive for headache. Negative for dizziness and weakness. " "  Hematological: Negative for adenopathy. Does not bruise/bleed easily.       Past Medical History:   Diagnosis Date   • Anemia    • Arthritis    • Body piercing     EARS   • Depression    • Dysphagia     REPORTS SOMETIMES SHE HAS PAIN WHEN SWALLOWING FOOD   • Elevated cholesterol    • Elevated LFTs    • Epigastric pain    • Exposure to TB     REPORTS MANY YEARS AGO. REPORTS TB TESTING HAS ALWAYS BEEN NEGATIVE.    • GERD (gastroesophageal reflux disease)    • History of bronchitis 01/2019   • History of chest pain     REPORTS FALL 2018 AND THAT SHE HAD TESTING THAT WAS WNL'S. REPORTS SHE IS NOW BEING CHECKED FOR GI.    • History of exercise stress test     2018 - REPORTS WAS TOLD ALL WNL'S    • History of fracture     TOE   • Hyperlipidemia    • Latex allergy    • Murmur     REPORTS \"A SLIGHT MURMUR\"   • Osteoporosis    • Plantar fasciitis    • Seasonal allergies    • Vertigo    • Wears glasses     PRN     Family History   Problem Relation Age of Onset   • Breast cancer Maternal Aunt    • Lung cancer Mother    • Heart attack Father    • Crohn's disease Sister    • Stomach cancer Paternal Grandfather    • Alcohol abuse Paternal Grandfather    • Colon cancer Neg Hx    • Cirrhosis Neg Hx    • Liver disease Neg Hx    • Liver cancer Neg Hx    • Ulcerative colitis Neg Hx    • Esophageal cancer Neg Hx      Past Surgical History:   Procedure Laterality Date   • ABDOMINOPLASTY  2003   • AUGMENTATION MAMMAPLASTY Bilateral    • COLONOSCOPY     • ENDOSCOPY N/A 2/12/2019    Procedure: ESOPHAGOGASTRODUODENOSCOPY with cold biopsy and esophageal dilation;  Surgeon: Brenden Steward MD;  Location: [facility];  Service: Gastroenterology   • WISDOM TOOTH EXTRACTION       Social History     Socioeconomic History   • Marital status: Single     Spouse name: Not on file   • Number of children: Not on file   • Years of education: Not on file   • Highest education level: Not on file   Tobacco Use   • Smoking status: Former Smoker     " Packs/day: 0.25     Years: 5.00     Pack years: 1.25     Types: Cigarettes     Last attempt to quit: 2019     Years since quittin.1   • Smokeless tobacco: Never Used   • Tobacco comment: USING NICOTINE GUM   Substance and Sexual Activity   • Alcohol use: Yes     Comment: SOCIAL USE, NO HISTORY OF ABUSE REPORTED   • Drug use: No   • Sexual activity: Defer         Current Outpatient Medications:   •  atorvastatin (LIPITOR) 20 MG tablet, Take 1 tablet by mouth Every Night., Disp: 30 tablet, Rfl: 11  •  Biotin 1000 MCG tablet, Take 1,000 mcg by mouth Daily., Disp: , Rfl:   •  buPROPion XL (WELLBUTRIN XL) 150 MG 24 hr tablet, Take 1 tablet by mouth Every Morning., Disp: 30 tablet, Rfl: 5  •  celecoxib (CeleBREX) 200 MG capsule, Take 1 capsule by mouth Daily As Needed for Moderate Pain ., Disp: 30 capsule, Rfl: 2  •  Cholecalciferol (VITAMIN D3) 1000 units capsule, Take 2,000 Units by mouth Daily., Disp: , Rfl:   •  diclofenac (VOLTAREN) 1 % gel gel, Apply 4 g topically to the appropriate area as directed 4 (Four) Times a Day., Disp: 100 g, Rfl: 2  •  dicyclomine (BENTYL) 20 MG tablet, take 1 tablet by mouth four times a day if needed, Disp: , Rfl: 0  •  FLUoxetine (PROzac) 20 MG capsule, Take 2 capsules by mouth Daily., Disp: 60 capsule, Rfl: 11  •  fluticasone (FLONASE) 50 MCG/ACT nasal spray, instill 2 sprays into each nostril once daily, Disp: , Rfl: 0  •  Krill Oil 350 MG capsule, Take 350 mg by mouth Daily., Disp: , Rfl:   •  Magnesium 250 MG tablet, Take 500 mg by mouth Daily., Disp: , Rfl:   •  Multiple Vitamins-Minerals (MULTIVITAMIN WITH MINERALS) tablet tablet, Take 1 tablet by mouth Daily., Disp: , Rfl:   •  nicotine polacrilex ([employer] NICOTINE) 2 MG gum, Chew 1 each As Needed for Smoking Cessation., Disp: 50 each, Rfl: 2  •  pantoprazole (PROTONIX) 40 MG EC tablet, Take 1 tablet by mouth 30 minutes before breakfast daily., Disp: 30 tablet, Rfl: 2  •  RA VITAMIN D-3 2000 units capsule, , Disp: , Rfl: 0  •  " traZODone (DESYREL) 50 MG tablet, Take 1 tablet by mouth At Night As Needed for Sleep., Disp: 30 tablet, Rfl: 5  •  cyclobenzaprine (FLEXERIL) 5 MG tablet, Take 1 tablet by mouth 3 (Three) Times a Day As Needed for Muscle Spasms., Disp: 90 tablet, Rfl: 0  •  fexofenadine (ALLEGRA ALLERGY) 180 MG tablet, Take 1 tablet by mouth Daily., Disp: 30 tablet, Rfl: 5    Objective   Vitals:    03/26/19 1142   BP: 120/70   Pulse: 80   Resp: 16   Temp: 98.8 °F (37.1 °C)   TempSrc: Temporal   SpO2: 99%   Weight: 54.4 kg (120 lb)   Height: 149.9 cm (59\")     Body mass index is 24.24 kg/m².  Physical Exam   Constitutional: She is oriented to person, place, and time. She appears well-developed and well-nourished. No distress.   HENT:   Head: Normocephalic and atraumatic.   Right Ear: Tympanic membrane, external ear and ear canal normal.   Left Ear: Tympanic membrane, external ear and ear canal normal.   Nose: Right sinus exhibits no maxillary sinus tenderness and no frontal sinus tenderness. Left sinus exhibits no maxillary sinus tenderness and no frontal sinus tenderness.   Mouth/Throat: Oropharyngeal exudate present. No posterior oropharyngeal edema, posterior oropharyngeal erythema or tonsillar abscesses.   Eyes: Pupils are equal, round, and reactive to light.   Neck: Neck supple. No thyromegaly present.   Cardiovascular: Normal rate and regular rhythm.   Pulmonary/Chest: Effort normal and breath sounds normal.   Musculoskeletal:        Cervical back: She exhibits decreased range of motion and pain. She exhibits no tenderness, no bony tenderness, no swelling, no edema, no deformity, no laceration, no spasm and normal pulse.   Neurological: She is alert and oriented to person, place, and time.   Skin: Skin is warm and dry. Capillary refill takes 2 to 3 seconds. She is not diaphoretic.        Psychiatric: She has a normal mood and affect. Judgment and thought content normal. Her speech is rapid and/or pressured. She is " hyperactive.   Nursing note and vitals reviewed.      Assessment/Plan   Problem List Items Addressed This Visit        Respiratory    Allergic rhinitis    Relevant Medications    fexofenadine (ALLEGRA ALLERGY) 180 MG tablet       Musculoskeletal and Integument    Osteoarthritis - Primary    Relevant Medications    celecoxib (CeleBREX) 200 MG capsule    diclofenac (VOLTAREN) 1 % gel gel    cyclobenzaprine (FLEXERIL) 5 MG tablet    Other Relevant Orders    Ambulatory Referral to Physical Therapy Evaluate and treat       Other    Ganglion cyst of wrist, left      Other Visit Diagnoses     Jaw pain            Areli was seen today for neck pain, headache and allergies.    Diagnoses and all orders for this visit:    Primary osteoarthritis involving multiple joints  -     diclofenac (VOLTAREN) 1 % gel gel; Apply 4 g topically to the appropriate area as directed 4 (Four) Times a Day.         -Multi-joint pain likely from discontinuation of NSAIDs.  Recommend Voltaren gel.  Ganglion cyst of wrist, left  Advised that I could refer her to a surgeon to discuss removal, she would like to wait on this at this time.  Advised that this may resolve on its own.  Osteoarthritis of cervical spine, unspecified spinal osteoarthritis complication status  -     diclofenac (VOLTAREN) 1 % gel gel; Apply 4 g topically to the appropriate area as directed 4 (Four) Times a Day.  -     cyclobenzaprine (FLEXERIL) 5 MG tablet; Take 1 tablet by mouth 3 (Three) Times a Day As Needed for Muscle Spasms.  -     Ambulatory Referral to Physical Therapy Evaluate and treat  We will try muscle relaxer and refer to PT, x-ray from 2016 shows degenerative changes  Seasonal allergic rhinitis due to pollen  -     fexofenadine (ALLEGRA ALLERGY) 180 MG tablet; Take 1 tablet by mouth Daily.  Patient complains of dry eye and worsening of allergies on Zyrtec, will try Allegra.  Jaw pain  Advised that jaw pain may be secondary to over compensation with mouth movements  from broken tooth.  Advised to return to dentist to have tooth repaired to see if there is improvement.  She also reports that she may grind her teeth, advised to discuss with dentist a mouthguard to prevent grinding           The patient was counseled regarding diagnostic results, impressions, prognosis, instructions for management, risk factor reductions, education, and importance of treatment compliance.  The patient verbalized understanding of and agreement with the plan of care.    Advised patient to call with any further questions and any new or worsening symptoms.     Return for Next scheduled follow up.      JAI Neves

## 2019-03-28 ENCOUNTER — OFFICE VISIT (OUTPATIENT)
Dept: GASTROENTEROLOGY | Facility: CLINIC | Age: 59
End: 2019-03-28

## 2019-03-28 VITALS
SYSTOLIC BLOOD PRESSURE: 102 MMHG | HEIGHT: 59 IN | RESPIRATION RATE: 12 BRPM | WEIGHT: 120 LBS | DIASTOLIC BLOOD PRESSURE: 74 MMHG | BODY MASS INDEX: 24.19 KG/M2 | TEMPERATURE: 98.1 F | HEART RATE: 74 BPM

## 2019-03-28 DIAGNOSIS — R10.33 PERIUMBILICAL ABDOMINAL PAIN: Chronic | ICD-10-CM

## 2019-03-28 DIAGNOSIS — R79.89 ELEVATED LIVER FUNCTION TESTS: ICD-10-CM

## 2019-03-28 DIAGNOSIS — Q39.8 INLET PATCH OF ESOPHAGUS: Chronic | ICD-10-CM

## 2019-03-28 DIAGNOSIS — K25.9 GASTRIC ULCER WITHOUT HEMORRHAGE OR PERFORATION, UNSPECIFIED CHRONICITY: ICD-10-CM

## 2019-03-28 DIAGNOSIS — K22.2 ESOPHAGEAL STRICTURE: ICD-10-CM

## 2019-03-28 DIAGNOSIS — A04.8 HELICOBACTER PYLORI INFECTION: ICD-10-CM

## 2019-03-28 DIAGNOSIS — R13.10 DYSPHAGIA, UNSPECIFIED TYPE: Chronic | ICD-10-CM

## 2019-03-28 DIAGNOSIS — R11.0 NAUSEA: Chronic | ICD-10-CM

## 2019-03-28 DIAGNOSIS — R07.89 OTHER CHEST PAIN: Primary | Chronic | ICD-10-CM

## 2019-03-28 DIAGNOSIS — R12 HEARTBURN: Chronic | ICD-10-CM

## 2019-03-28 PROCEDURE — 99214 OFFICE O/P EST MOD 30 MIN: CPT | Performed by: NURSE PRACTITIONER

## 2019-03-28 RX ORDER — PANTOPRAZOLE SODIUM 40 MG/1
TABLET, DELAYED RELEASE ORAL
Qty: 30 TABLET | Refills: 5 | Status: SHIPPED | OUTPATIENT
Start: 2019-03-28 | End: 2019-04-15 | Stop reason: SDUPTHER

## 2019-03-28 RX ORDER — METRONIDAZOLE 250 MG/1
TABLET ORAL
Qty: 56 TABLET | Refills: 0 | Status: SHIPPED | OUTPATIENT
Start: 2019-03-28 | End: 2019-06-10

## 2019-03-28 RX ORDER — PANTOPRAZOLE SODIUM 40 MG/1
TABLET, DELAYED RELEASE ORAL
Qty: 28 TABLET | Refills: 0 | Status: SHIPPED | OUTPATIENT
Start: 2019-03-28 | End: 2019-04-15

## 2019-03-28 RX ORDER — CLARITHROMYCIN 500 MG/1
TABLET, COATED ORAL
Qty: 28 TABLET | Refills: 0 | Status: SHIPPED | OUTPATIENT
Start: 2019-03-28 | End: 2019-06-10

## 2019-03-28 RX ORDER — ONDANSETRON 4 MG/1
4 TABLET, ORALLY DISINTEGRATING ORAL EVERY 8 HOURS PRN
Qty: 30 TABLET | Refills: 1 | Status: SHIPPED | OUTPATIENT
Start: 2019-03-28 | End: 2021-11-23 | Stop reason: SDUPTHER

## 2019-03-28 RX ORDER — SODIUM CHLORIDE 9 MG/ML
70 INJECTION, SOLUTION INTRAVENOUS CONTINUOUS PRN
Status: CANCELLED | OUTPATIENT
Start: 2019-06-11

## 2019-03-28 NOTE — PATIENT INSTRUCTIONS
1. Antireflux measures: Avoid fried, fatty foods, alcohol, chocolate, coffee, tea,  soft drinks, peppermint and spearmint, spicy foods, tomatoes and tomato based foods, onion based foods, and smoking. Other antireflux measures include weight reduction if overweight, avoiding tight clothing around the abdomen, elevating the head of the bed 6 inches with blocks under the head board, and don't drink or eat before going to bed and avoid lying down immediately after meals.  2. Pantoprazole 40 mg 1 tablet by mouth in the am 30 minutes before breakfast.  3. Zofran 4 mg 1 po every 8 hours as needed for nausea.  4. Avoid NSAIDs if possible.   5. Treatment for H. Pylori:       A. Falgyl 250 mg 1 tablets by mouth four times a day x 14 days. (The patient is allergic to penicillins)       B. Clarithromycin 500 mg 1 tablet by mouth twice a day x 14 days.       C. Pantoprazole 40 mg 1 tablet by mouth twice a day x 14 days.  6. Patient may take probiotics while taking antibiotics.  7. The patient is to hold atorvastatin while taking treatment for H. Pylori, then resume.  8. The patient may need further evaluation of elevated liver enzymes if they remain elevated. Acute hepatitis panel negative. According to the patient, enzymes are monitored by her PCP.  9. Upper endoscopy-EGD to ensure gastric ulcer healing: Description of the procedure, risks, benefits, alternatives and options, including nonoperative options, were discussed with the patient in detail. The patient understands and wishes to proceed.

## 2019-03-28 NOTE — PROGRESS NOTES
Chief Complaint   Patient presents with   • Follow-up     The patient is here for follow up. She has been feeling much better.    The patient had been having some retrosternal chest pains off and on for the last 1 year or so. The pain has significantly improved. The pain is described as mild tightness-squeeze-type feeling perhaps 3-4 times a month. The pain is nonexertional and may last for 5 seconds or so. Patient had undergone some cardiac workup that included echocardiogram and a stress test that was normal. She is taking Pantoprazole and has had significant improvement with chest pain.     The patient has a history of reflux off and on for the last 1 year . Heartburn has significantly improved with Pantoprazole. The reflux is mildly severe.  Symptoms are described as retrosternal burning sensation, and indigestion.  There is no history of occasional regurgitative symptoms.  Frequency being 1-2 per week.  The symptoms are worse at night, but this has improved with Pantoprazole.     The patient has history of recurrent nausea for the last several years.  Nausea has significantly improved. The nausea is described as mild, frequency being a couple of times a month.  Nauseous feeling may last for several minutes. She may have nausea 1-2 times per month now.  Eating worsens the symptom however no definite relieving factors of nausea.  There is no associated vomiting.      The patient has difficulty swallowing off and for the last months. Swallowing has significantly improved. The symptom is moderate in severity, occurs perhaps once or twice a month and is mostly associated with solid foods, but she has not had any problems swallowing since her EGD. The symptoms are not progressive.  There is no associated weight loss.     The patient denies diarrhea or constipation on a regular basis. There is no history of overt GI bleed (hematemesis, melena or hematochezia). The patient has history of periumbilical abdominal  cramping off and on for the last few years, perhaps once every 1-2 months. This has improved. She would feel constipated and then would have some explosive diarrhea. The patient would also break into a cold sweat and feel nauseated. The patient has taken Bentyl-dicyclomine with improvement of her symptoms. Now she takes on occasions perhaps once every 1-2 months.    Abdominal Pain   This is a chronic problem. The current episode started more than 1 year ago. The onset quality is sudden. The problem occurs intermittently. The problem has been rapidly improving. The pain is located in the epigastric region and periumbilical region. The pain is mild. Quality: squeezing. The abdominal pain radiates to the chest. Associated symptoms include arthralgias, headaches, myalgias and nausea. Pertinent negatives include no constipation, diarrhea, dysuria, fever, hematuria or vomiting. The pain is aggravated by eating. The pain is relieved by nothing. She has tried proton pump inhibitors for the symptoms. The treatment provided significant relief. Prior diagnostic workup includes upper endoscopy. Her past medical history is significant for GERD.   Heartburn   She complains of chest pain, coughing, heartburn and nausea. She reports no abdominal pain. This is a chronic problem. The current episode started more than 1 year ago. The problem occurs occasionally. The problem has been rapidly improving. The heartburn duration is an hour. The heartburn is located in the substernum. The heartburn is of mild intensity. The heartburn wakes her from sleep. Nothing aggravates the symptoms. Pertinent negatives include no fatigue. There are no known risk factors. She has tried a PPI for the symptoms. The treatment provided significant relief. Past procedures include an EGD (2/2019).   Nausea   This is a chronic problem. Episode onset: over 2 years. The problem occurs intermittently. The problem has been rapidly improving. Associated symptoms  include arthralgias, chest pain, coughing, headaches, myalgias and nausea. Pertinent negatives include no abdominal pain, chills, fatigue, fever, joint swelling, rash or vomiting. The symptoms are aggravated by eating. Treatments tried: Zofran. The treatment provided significant relief.   Difficulty Swallowing   This is a chronic problem. The current episode started more than 1 month ago. The problem occurs rarely. The problem has been rapidly improving. Associated symptoms include arthralgias, chest pain, coughing, headaches, myalgias and nausea. Pertinent negatives include no abdominal pain, chills, fatigue, fever, joint swelling, rash or vomiting. The symptoms are aggravated by eating. She has tried nothing for the symptoms.     Review of Systems   Constitutional: Negative for appetite change, chills, fatigue, fever and unexpected weight change.   HENT: Positive for rhinorrhea. Negative for mouth sores, nosebleeds and trouble swallowing.    Eyes: Positive for itching. Negative for discharge and redness.   Respiratory: Positive for cough. Negative for apnea and shortness of breath.    Cardiovascular: Positive for chest pain. Negative for palpitations and leg swelling.   Gastrointestinal: Positive for heartburn and nausea. Negative for abdominal pain, anal bleeding, blood in stool, constipation, diarrhea and vomiting.   Endocrine: Negative for cold intolerance, heat intolerance and polydipsia.   Genitourinary: Negative for dysuria, hematuria and urgency.   Musculoskeletal: Positive for arthralgias and myalgias. Negative for joint swelling.   Skin: Negative for rash.   Allergic/Immunologic: Negative for food allergies and immunocompromised state.   Neurological: Positive for headaches. Negative for dizziness, seizures and syncope.   Hematological: Negative for adenopathy. Does not bruise/bleed easily.   Psychiatric/Behavioral: Negative for dysphoric mood. The patient is not nervous/anxious and is not hyperactive.   "    Patient Active Problem List   Diagnosis   • Allergic rhinitis   • Anxiety state   • Hyperlipidemia   • Osteoporosis   • Gastroesophageal reflux disease with esophagitis   • Other insomnia   • Depressed   • Nausea   • Dysphagia   • Heartburn   • Periumbilical abdominal pain   • Chest pain   • Osteoarthritis   • Chronic superficial gastritis without bleeding   • Ganglion cyst of wrist, left   • Esophageal stricture   • Gastric ulcer without hemorrhage or perforation   • Inlet patch of esophagus   • Helicobacter pylori infection     Past Medical History:   Diagnosis Date   • Anemia    • Arthritis    • Body piercing     EARS   • Depression    • Dysphagia     REPORTS SOMETIMES SHE HAS PAIN WHEN SWALLOWING FOOD   • Elevated cholesterol    • Elevated LFTs    • Epigastric pain    • Exposure to TB     REPORTS MANY YEARS AGO. REPORTS TB TESTING HAS ALWAYS BEEN NEGATIVE.    • GERD (gastroesophageal reflux disease)    • History of bronchitis 01/2019   • History of chest pain     REPORTS FALL 2018 AND THAT SHE HAD TESTING THAT WAS WNL'S. REPORTS SHE IS NOW BEING CHECKED FOR GI.    • History of exercise stress test     2018 - REPORTS WAS TOLD ALL WNL'S    • History of fracture     TOE   • Hyperlipidemia    • Latex allergy    • Murmur     REPORTS \"A SLIGHT MURMUR\"   • Osteoporosis    • Plantar fasciitis    • Seasonal allergies    • Vertigo    • Wears glasses     PRN     Past Surgical History:   Procedure Laterality Date   • ABDOMINOPLASTY  2003   • AUGMENTATION MAMMAPLASTY Bilateral    • COLONOSCOPY  02/02/2016   • ENDOSCOPY N/A 2/12/2019    Procedure: ESOPHAGOGASTRODUODENOSCOPY with cold biopsy and esophageal dilation;  Surgeon: Brenden Steward MD;  Location: [facility] ENDOSCOPY;  Service: Gastroenterology   • UPPER GASTROINTESTINAL ENDOSCOPY  02/12/2019   • WISDOM TOOTH EXTRACTION       Family History   Problem Relation Age of Onset   • Breast cancer Maternal Aunt    • Lung cancer Mother    • Heart attack Father    • Crohn's " disease Sister    • Stomach cancer Paternal Grandfather    • Alcohol abuse Paternal Grandfather    • Colon cancer Neg Hx    • Cirrhosis Neg Hx    • Liver disease Neg Hx    • Liver cancer Neg Hx    • Ulcerative colitis Neg Hx    • Esophageal cancer Neg Hx      Social History     Tobacco Use   • Smoking status: Former Smoker     Packs/day: 0.25     Years: 5.00     Pack years: 1.25     Types: Cigarettes     Last attempt to quit: 2019     Years since quittin.1   • Smokeless tobacco: Never Used   Substance Use Topics   • Alcohol use: Yes     Comment: SOCIAL USE, NO HISTORY OF ABUSE REPORTED       Current Outpatient Medications:   •  atorvastatin (LIPITOR) 20 MG tablet, Take 1 tablet by mouth Every Night., Disp: 30 tablet, Rfl: 11  •  Biotin 1000 MCG tablet, Take 1,000 mcg by mouth Daily., Disp: , Rfl:   •  buPROPion XL (WELLBUTRIN XL) 150 MG 24 hr tablet, Take 1 tablet by mouth Every Morning., Disp: 30 tablet, Rfl: 5  •  cyclobenzaprine (FLEXERIL) 5 MG tablet, Take 1 tablet by mouth 3 (Three) Times a Day As Needed for Muscle Spasms., Disp: 90 tablet, Rfl: 0  •  diclofenac (VOLTAREN) 1 % gel gel, Apply 4 g topically to the appropriate area as directed 4 (Four) Times a Day., Disp: 100 g, Rfl: 2  •  dicyclomine (BENTYL) 20 MG tablet, take 1 tablet by mouth four times a day if needed, Disp: , Rfl: 0  •  fexofenadine (ALLEGRA ALLERGY) 180 MG tablet, Take 1 tablet by mouth Daily., Disp: 30 tablet, Rfl: 5  •  FLUoxetine (PROzac) 20 MG capsule, Take 2 capsules by mouth Daily., Disp: 60 capsule, Rfl: 11  •  fluticasone (FLONASE) 50 MCG/ACT nasal spray, instill 2 sprays into each nostril once daily, Disp: , Rfl: 0  •  Magnesium 250 MG tablet, Take 500 mg by mouth Daily., Disp: , Rfl:   •  Multiple Vitamins-Minerals (MULTIVITAMIN WITH MINERALS) tablet tablet, Take 1 tablet by mouth Daily., Disp: , Rfl:   •  Omega-3 Fatty Acids (FISH OIL PO), Take 1 capsule by mouth Daily., Disp: , Rfl:   •  pantoprazole (PROTONIX) 40 MG EC  "tablet, Take 1 tablet by mouth 30 minutes before breakfast daily., Disp: 30 tablet, Rfl: 5  •  RA VITAMIN D-3 2000 units capsule, 2,000 Units Daily., Disp: , Rfl: 0  •  traZODone (DESYREL) 50 MG tablet, Take 1 tablet by mouth At Night As Needed for Sleep., Disp: 30 tablet, Rfl: 5  •  clarithromycin (BIAXIN) 500 MG tablet, Take 1 tablet twice a day with food, Disp: 28 tablet, Rfl: 0  •  metroNIDAZOLE (FLAGYL) 250 MG tablet, 1 tablet po four times daily for 14 days, Disp: 56 tablet, Rfl: 0  •  ondansetron ODT (ZOFRAN-ODT) 4 MG disintegrating tablet, Take 1 tablet by mouth Every 8 (Eight) Hours As Needed for Nausea or Vomiting., Disp: 30 tablet, Rfl: 1  •  pantoprazole (PROTONIX) 40 MG EC tablet, Take 1 tablet twice a day 30 minutes before meals x 14 days, Disp: 28 tablet, Rfl: 0  •  Probiotic capsule, Take 1 capsule by mouth Daily., Disp: 30 capsule, Rfl: 1    Allergies   Allergen Reactions   • Latex Rash   • Penicillins Rash     Blood pressure 102/74, pulse 74, temperature 98.1 °F (36.7 °C), resp. rate 12, height 149.9 cm (59\"), weight 54.4 kg (120 lb), not currently breastfeeding.    Physical Exam   Constitutional: She is oriented to person, place, and time. She appears well-developed and well-nourished. No distress.   HENT:   Head: Normocephalic and atraumatic.   Right Ear: Hearing and external ear normal.   Left Ear: Hearing and external ear normal.   Nose: Nose normal.   Mouth/Throat: Oropharynx is clear and moist and mucous membranes are normal. Mucous membranes are not pale, not dry and not cyanotic. No oral lesions. No oropharyngeal exudate.   Eyes: Conjunctivae and EOM are normal. Right eye exhibits no discharge. Left eye exhibits no discharge.   Neck: Trachea normal. Neck supple. No JVD present. No edema present. No thyroid mass and no thyromegaly present.   Cardiovascular: Normal rate, regular rhythm, S2 normal and normal heart sounds. Exam reveals no gallop, no S3 and no friction rub.   No murmur " heard.  Pulmonary/Chest: Effort normal and breath sounds normal. No respiratory distress. She exhibits no tenderness.   Abdominal: Normal appearance and bowel sounds are normal. She exhibits no distension, no ascites and no mass. There is no splenomegaly or hepatomegaly. There is no tenderness. There is no rigidity, no rebound and no guarding. No hernia.     Vascular Status -  Her right foot exhibits no edema. Her left foot exhibits no edema.  Lymphadenopathy:     She has no cervical adenopathy.        Left: No supraclavicular adenopathy present.   Neurological: She is alert and oriented to person, place, and time. She has normal strength. No cranial nerve deficit or sensory deficit.   Skin: No rash noted. She is not diaphoretic. No cyanosis. No pallor. Nails show no clubbing.   Psychiatric: She has a normal mood and affect.   Nursing note and vitals reviewed.  Stigmata of chronic liver disease:  None.  Asterixis:  None.    Laboratory Tests:   Upon review of records:     Dated October 1, 2018 sodium 137 potassium 4.4 chloride 101 CO2 28 BUN 15 serum creatinine 0.81 and glucose 82.  Calcium 9.2.  Total protein 6.5.  Albumin 4.60.  T bili 0.3 AST 50 ALT 58 alkaline phosphatase 61.  TSH 1.507.  Vitamin B12 level 760.  WBC 11.32 hemoglobin 13.0 hematocrit 39.7 MCV 90.8 and platelet count 290.  Hepatitis A IgM negative.  Hepatitis B surface antigen negative.  Hepatitis B core IgM negative.  Hepatitis C virus antibody negative at <0.1.    Dated 2/4/2019 glucose 95 BUN 15 creatinine 0.83 sodium 137 potassium 4.3 chloride 104 CO2 28 calcium 9.2 albumin 4.54 total bilirubin 0.5 alkaline phosphatase 44 AST 35 ALT 46 TSH 1.646     Procedures:  Upon review of records:     Dated February 2, 2016 the patient underwent a colonoscopy to the terminal ileum which revealed: Colon polyps.  Scant vascular ectasia-nonbleeding.  Internal hemorrhoids.  Rectal polyps, biopsy revealed hyperplastic polyp fragments.  Cecum polyps, biopsy  revealed polypoid colon mucosa with surface reactive epithelial changes. Negative for adenoma or hyperplasia.    EGD dated 2/12/2019 reveals distal esophageal stricture.  Status post serial dilation to 18 mm.  Erosive distal esophagitis.  LA class A.  Small sliding hiatal hernia less than 3 cm.  Erythematous erosive gastritis.  0.75 cm clean-based gastric antral ulcer.  Risks of bleeding from clean-based gastric ulcer less than 5%.  Erythematous erosive bulbar duodenitis as well as proximal second portion of duodenum.  Localized areas of gastric type mucosa within the proximal esophagus consistent with inlet patches.  Proximal esophageal ring.  Nonobstructive.  Subtle concentric rings within the mid and distal esophagus.  Duodenum second portion biopsy reveals benign duodenal mucosa with no focal nonspecific reactive changes.  Duodenum second portion biopsy reveals benign duodenal mucosa with no diagnostic abnormality.  Gastric antrum ulcer biopsy reveals marked chronic active gastritis with ulcer slough.  H. pylori positive.  Antrum body and fundus biopsy reveals chronic focally active gastritis.  H. pylori positive.  Mid and distal esophagus biopsy reveals reflux esophagitis with focal erosion.  Chronic Gastritis.  H. pylori positive.  Negative for intestinal metaplasia.  Proximal esophagus biopsy reveals inlet patch showing chronic gastritis with lymphoid aggregate.    Assessment:      ICD-10-CM ICD-9-CM   1. Other chest pain R07.89 786.59   2. Heartburn R12 787.1   3. Nausea R11.0 787.02   4. Dysphagia, unspecified type R13.10 787.20   5. Periumbilical abdominal pain R10.33 789.05   6. Esophageal stricture K22.2 530.3   7. Gastric ulcer without hemorrhage or perforation, unspecified chronicity K25.9 531.90   8. Inlet patch of esophagus Q39.8 750.4   9. Helicobacter pylori infection A04.8 041.86   10. Elevated liver function tests R94.5 790.6       Discussion:  1. Chest pain significantly improved.  2. Heartburn  significantly improved.  Inlet patch present.  Biopsies negative for true Stinson's.  3. Nausea significantly improved.  4. Difficulty swallowing significantly improved.  5. Biopsies positive for H. pylori.    Plan/  Patient Instructions   1. Antireflux measures: Avoid fried, fatty foods, alcohol, chocolate, coffee, tea,  soft drinks, peppermint and spearmint, spicy foods, tomatoes and tomato based foods, onion based foods, and smoking. Other antireflux measures include weight reduction if overweight, avoiding tight clothing around the abdomen, elevating the head of the bed 6 inches with blocks under the head board, and don't drink or eat before going to bed and avoid lying down immediately after meals.  2. Pantoprazole 40 mg 1 tablet by mouth in the am 30 minutes before breakfast.  3. Zofran 4 mg 1 po every 8 hours as needed for nausea.  4. Avoid NSAIDs if possible.   5. Treatment for H. Pylori:       A. Falgyl 250 mg 1 tablets by mouth four times a day x 14 days. (The patient is allergic to penicillins)       B. Clarithromycin 500 mg 1 tablet by mouth twice a day x 14 days.       C. Pantoprazole 40 mg 1 tablet by mouth twice a day x 14 days.  6. Patient may take probiotics while taking antibiotics.  7. The patient is to hold atorvastatin while taking treatment for H. Pylori, then resume.  8. The patient may need further evaluation of elevated liver enzymes if they remain elevated. Acute hepatitis panel negative. According to the patient, enzymes are monitored by her PCP.  9. Upper endoscopy-EGD to ensure gastric ulcer healing: Description of the procedure, risks, benefits, alternatives and options, including nonoperative options, were discussed with the patient in detail. The patient understands and wishes to proceed.     JAI Henry

## 2019-04-07 DIAGNOSIS — G47.09 OTHER INSOMNIA: ICD-10-CM

## 2019-04-08 RX ORDER — TRAZODONE HYDROCHLORIDE 50 MG/1
TABLET ORAL
Qty: 30 TABLET | Refills: 5 | Status: SHIPPED | OUTPATIENT
Start: 2019-04-08 | End: 2019-10-01 | Stop reason: SDUPTHER

## 2019-04-12 ENCOUNTER — TELEPHONE (OUTPATIENT)
Dept: GASTROENTEROLOGY | Facility: CLINIC | Age: 59
End: 2019-04-12

## 2019-04-12 DIAGNOSIS — R12 HEARTBURN: Chronic | ICD-10-CM

## 2019-04-12 DIAGNOSIS — K25.9 GASTRIC ULCER WITHOUT HEMORRHAGE OR PERFORATION, UNSPECIFIED CHRONICITY: ICD-10-CM

## 2019-04-12 DIAGNOSIS — Q39.8 INLET PATCH OF ESOPHAGUS: Chronic | ICD-10-CM

## 2019-04-12 NOTE — TELEPHONE ENCOUNTER
Pt calls for refill on Protonix has been taking BID and ran out, explained to her per office note take only bid x2 wks, should had refills also , but says not have any, was taking her Prilosecto replace protonix.

## 2019-04-15 ENCOUNTER — TELEPHONE (OUTPATIENT)
Dept: INTERNAL MEDICINE | Facility: CLINIC | Age: 59
End: 2019-04-15

## 2019-04-15 DIAGNOSIS — M15.9 PRIMARY OSTEOARTHRITIS INVOLVING MULTIPLE JOINTS: ICD-10-CM

## 2019-04-15 DIAGNOSIS — M47.812 OSTEOARTHRITIS OF CERVICAL SPINE, UNSPECIFIED SPINAL OSTEOARTHRITIS COMPLICATION STATUS: ICD-10-CM

## 2019-04-15 RX ORDER — PANTOPRAZOLE SODIUM 40 MG/1
TABLET, DELAYED RELEASE ORAL
Qty: 30 TABLET | Refills: 5 | Status: SHIPPED | OUTPATIENT
Start: 2019-04-15 | End: 2020-05-20

## 2019-04-15 NOTE — TELEPHONE ENCOUNTER
Let her know I have sent in the refill of Protonix, but she only needs to take it once per day. She was only to take twice a day while being treated for H. Pylori. They should have given her a bottle of 28 tablets for twice a day and a separate bottle of 30 to take once per day.

## 2019-04-15 NOTE — TELEPHONE ENCOUNTER
Pt wants to no if she can get more of the diclofenac so she can use it for feet and neck. Pt said right now the script is only for her feet right now but wants to no if she can get so she can use for feet and her neck. Pt also wants to no is she needs to start her aspirin back? please call the pt back     Hold off on aspirin bc of her recent EGD showing gastritis  Ok to rf the diclofenac gel

## 2019-05-08 PROBLEM — R07.89 OTHER CHEST PAIN: Status: ACTIVE | Noted: 2019-05-08

## 2019-05-16 RX ORDER — GLUCOSAMINE/CHONDR SU A SOD 750-600 MG
TABLET ORAL
Qty: 30 CAPSULE | Refills: 2 | Status: SHIPPED | OUTPATIENT
Start: 2019-05-16 | End: 2019-08-05 | Stop reason: SDUPTHER

## 2019-05-20 RX ORDER — FLUTICASONE PROPIONATE 50 MCG
SPRAY, SUSPENSION (ML) NASAL
Qty: 15.8 ML | Refills: 1 | Status: SHIPPED | OUTPATIENT
Start: 2019-05-20 | End: 2019-06-13 | Stop reason: SDUPTHER

## 2019-06-02 DIAGNOSIS — F32.1 CURRENT MODERATE EPISODE OF MAJOR DEPRESSIVE DISORDER, UNSPECIFIED WHETHER RECURRENT (HCC): ICD-10-CM

## 2019-06-03 RX ORDER — BUPROPION HYDROCHLORIDE 150 MG/1
150 TABLET ORAL EVERY MORNING
Qty: 30 TABLET | Refills: 5 | Status: SHIPPED | OUTPATIENT
Start: 2019-06-03 | End: 2020-02-05

## 2019-06-10 ENCOUNTER — OFFICE VISIT (OUTPATIENT)
Dept: INTERNAL MEDICINE | Facility: CLINIC | Age: 59
End: 2019-06-10

## 2019-06-10 VITALS
OXYGEN SATURATION: 99 % | SYSTOLIC BLOOD PRESSURE: 108 MMHG | DIASTOLIC BLOOD PRESSURE: 68 MMHG | BODY MASS INDEX: 23.18 KG/M2 | RESPIRATION RATE: 16 BRPM | HEART RATE: 66 BPM | WEIGHT: 115 LBS | HEIGHT: 59 IN

## 2019-06-10 DIAGNOSIS — R20.2 PARESTHESIA OF BOTH HANDS: ICD-10-CM

## 2019-06-10 DIAGNOSIS — K25.9 GASTRIC ULCER WITHOUT HEMORRHAGE OR PERFORATION, UNSPECIFIED CHRONICITY: ICD-10-CM

## 2019-06-10 DIAGNOSIS — M47.812 OSTEOARTHRITIS OF CERVICAL SPINE, UNSPECIFIED SPINAL OSTEOARTHRITIS COMPLICATION STATUS: ICD-10-CM

## 2019-06-10 DIAGNOSIS — G47.09 OTHER INSOMNIA: ICD-10-CM

## 2019-06-10 DIAGNOSIS — F32.1 CURRENT MODERATE EPISODE OF MAJOR DEPRESSIVE DISORDER, UNSPECIFIED WHETHER RECURRENT (HCC): ICD-10-CM

## 2019-06-10 DIAGNOSIS — F41.1 ANXIETY STATE: ICD-10-CM

## 2019-06-10 DIAGNOSIS — K29.30 CHRONIC SUPERFICIAL GASTRITIS WITHOUT BLEEDING: ICD-10-CM

## 2019-06-10 DIAGNOSIS — M81.0 OSTEOPOROSIS, UNSPECIFIED OSTEOPOROSIS TYPE, UNSPECIFIED PATHOLOGICAL FRACTURE PRESENCE: ICD-10-CM

## 2019-06-10 DIAGNOSIS — K21.00 GASTROESOPHAGEAL REFLUX DISEASE WITH ESOPHAGITIS: ICD-10-CM

## 2019-06-10 DIAGNOSIS — B35.1 ONYCHOMYCOSIS: ICD-10-CM

## 2019-06-10 DIAGNOSIS — J30.9 ALLERGIC RHINITIS, UNSPECIFIED SEASONALITY, UNSPECIFIED TRIGGER: ICD-10-CM

## 2019-06-10 DIAGNOSIS — E78.2 MIXED HYPERLIPIDEMIA: Primary | ICD-10-CM

## 2019-06-10 PROCEDURE — 99214 OFFICE O/P EST MOD 30 MIN: CPT | Performed by: INTERNAL MEDICINE

## 2019-06-10 RX ORDER — TIZANIDINE 4 MG/1
4 TABLET ORAL NIGHTLY PRN
COMMUNITY
End: 2019-07-23

## 2019-06-10 NOTE — PROGRESS NOTES
"Patient is a 59 y.o. female who is here for a follow up of   Chief Complaint   Patient presents with   • Hyperlipidemia     4 month recheck, would like to increase amount on diclofenac gel, flonase Rx was only for 2 weeks. ears are itching and pt's eyes are dry   • Wrist Pain     pt having a lot of trouble with wrists         HPI:    Here for f/u.  Wants to see a podiatry for left 1st toenail fungus.  Has some paresthesia in hands that go numb.  Worst when gripping.  GERD is ok.  Depression improved with Wellbutrin.  Sleeping well.     History:    Patient Active Problem List   Diagnosis   • Allergic rhinitis   • Anxiety state   • Hyperlipidemia   • Osteoporosis   • Gastroesophageal reflux disease with esophagitis   • Other insomnia   • Depressed   • Nausea   • Dysphagia   • Heartburn   • Periumbilical abdominal pain   • Chest pain   • Osteoarthritis   • Chronic superficial gastritis without bleeding   • Ganglion cyst of wrist, left   • Esophageal stricture   • Gastric ulcer without hemorrhage or perforation   • Inlet patch of esophagus   • Helicobacter pylori infection   • Other chest pain   • Paresthesia of both hands       Past Medical History:   Diagnosis Date   • Anemia    • Arthritis    • Body piercing     EARS   • Depression    • Dysphagia     REPORTS SOMETIMES SHE HAS PAIN WHEN SWALLOWING FOOD   • Elevated cholesterol    • Elevated LFTs    • Epigastric pain    • Exposure to TB     REPORTS MANY YEARS AGO. REPORTS TB TESTING HAS ALWAYS BEEN NEGATIVE.    • GERD (gastroesophageal reflux disease)    • History of bronchitis 01/2019   • History of chest pain     REPORTS FALL 2018 AND THAT SHE HAD TESTING THAT WAS WNL'S. REPORTS SHE IS NOW BEING CHECKED FOR GI.    • History of exercise stress test     2018 - REPORTS WAS TOLD ALL WNL'S    • History of fracture     TOE   • Hyperlipidemia    • Latex allergy    • Murmur     REPORTS \"A SLIGHT MURMUR\"   • Osteoporosis    • Plantar fasciitis    • Seasonal allergies    • " Vertigo    • Wears glasses     PRN       Past Surgical History:   Procedure Laterality Date   • ABDOMINOPLASTY  2003   • AUGMENTATION MAMMAPLASTY Bilateral    • COLONOSCOPY  02/02/2016   • ENDOSCOPY N/A 2/12/2019    Procedure: ESOPHAGOGASTRODUODENOSCOPY with cold biopsy and esophageal dilation;  Surgeon: Brenden Steward MD;  Location: [facility] ENDOSCOPY;  Service: Gastroenterology   • UPPER GASTROINTESTINAL ENDOSCOPY  02/12/2019   • WISDOM TOOTH EXTRACTION         Current Outpatient Medications on File Prior to Visit   Medication Sig   • atorvastatin (LIPITOR) 20 MG tablet Take 1 tablet by mouth Every Night.   • Biotin 1000 MCG tablet Take 1,000 mcg by mouth Daily.   • buPROPion XL (WELLBUTRIN XL) 150 MG 24 hr tablet take 1 tablet by mouth every morning   • cyclobenzaprine (FLEXERIL) 5 MG tablet Take 1 tablet by mouth 3 (Three) Times a Day As Needed for Muscle Spasms.   • diclofenac (VOLTAREN) 1 % gel gel Apply 4 g topically to the appropriate area as directed 4 (Four) Times a Day.   • dicyclomine (BENTYL) 20 MG tablet take 1 tablet by mouth four times a day if needed   • fexofenadine (ALLEGRA ALLERGY) 180 MG tablet Take 1 tablet by mouth Daily.   • FLUoxetine (PROzac) 20 MG capsule Take 2 capsules by mouth Daily.   • fluticasone (FLONASE) 50 MCG/ACT nasal spray instill 2 sprays into each nostril once daily   • Magnesium 250 MG tablet Take 500 mg by mouth Daily.   • Multiple Vitamins-Minerals (MULTIVITAMIN WITH MINERALS) tablet tablet Take 1 tablet by mouth Daily.   • Omega-3 Fatty Acids (FISH OIL PO) Take 1 capsule by mouth Daily.   • ondansetron ODT (ZOFRAN-ODT) 4 MG disintegrating tablet Take 1 tablet by mouth Every 8 (Eight) Hours As Needed for Nausea or Vomiting.   • pantoprazole (PROTONIX) 40 MG EC tablet Take 1 tablet by mouth 30 minutes before breakfast daily.   • Probiotic capsule Take 1 capsule by mouth Daily.   • RA VITAMIN D-3 2000 units capsule take 1 capsule by mouth once daily   • tiZANidine (ZANAFLEX)  4 MG tablet Take 4 mg by mouth At Night As Needed for Muscle Spasms.   • traZODone (DESYREL) 50 MG tablet take 1 tablet by mouth at bedtime if needed for sleep   • [DISCONTINUED] clarithromycin (BIAXIN) 500 MG tablet Take 1 tablet twice a day with food   • [DISCONTINUED] metroNIDAZOLE (FLAGYL) 250 MG tablet 1 tablet po four times daily for 14 days     No current facility-administered medications on file prior to visit.        Family History   Problem Relation Age of Onset   • Breast cancer Maternal Aunt    • Lung cancer Mother    • Heart attack Father    • Crohn's disease Sister    • Stomach cancer Paternal Grandfather    • Alcohol abuse Paternal Grandfather    • Colon cancer Neg Hx    • Cirrhosis Neg Hx    • Liver disease Neg Hx    • Liver cancer Neg Hx    • Ulcerative colitis Neg Hx    • Esophageal cancer Neg Hx        Social History     Socioeconomic History   • Marital status: Single     Spouse name: Not on file   • Number of children: Not on file   • Years of education: Not on file   • Highest education level: Not on file   Tobacco Use   • Smoking status: Former Smoker     Packs/day: 0.25     Years: 5.00     Pack years: 1.25     Types: Cigarettes     Last attempt to quit: 2019     Years since quittin.3   • Smokeless tobacco: Never Used   Substance and Sexual Activity   • Alcohol use: Yes     Comment: SOCIAL USE, NO HISTORY OF ABUSE REPORTED   • Drug use: No   • Sexual activity: Defer         Review of Systems   Constitutional: Negative for chills, fatigue and fever.   HENT: Negative for congestion, ear pain, hearing loss, rhinorrhea, sinus pressure, sore throat and trouble swallowing.    Eyes: Negative for discharge and itching.   Respiratory: Negative for cough, chest tightness and shortness of breath.    Cardiovascular: Negative for chest pain, palpitations and leg swelling.        Summer 2018 cardiac w/u neg   Gastrointestinal: Negative for abdominal pain, blood in stool, constipation, diarrhea and  "vomiting.        2/16 colonoscopy normal   Endocrine: Negative for polydipsia and polyuria.   Genitourinary: Negative for difficulty urinating, dysuria, enuresis, frequency, hematuria and urgency.        7/18 mammogram   Musculoskeletal: Positive for arthralgias. Negative for back pain, gait problem and joint swelling.   Skin: Negative for rash and wound.   Allergic/Immunologic: Negative for immunocompromised state.   Neurological: Positive for numbness. Negative for dizziness, syncope, weakness, light-headedness and headaches.   Hematological: Does not bruise/bleed easily.   Psychiatric/Behavioral: Positive for behavioral problems, dysphoric mood and sleep disturbance. The patient is not nervous/anxious.        /68   Pulse 66   Resp 16   Ht 149.9 cm (59\")   Wt 52.2 kg (115 lb)   LMP  (LMP Unknown)   SpO2 99%   BMI 23.23 kg/m²       Physical Exam   Constitutional: She is oriented to person, place, and time. She appears well-developed and well-nourished. No distress.   HENT:   Head: Normocephalic and atraumatic.   Right Ear: Tympanic membrane, external ear and ear canal normal.   Left Ear: Tympanic membrane, external ear and ear canal normal.   Nose: Right sinus exhibits no maxillary sinus tenderness and no frontal sinus tenderness. Left sinus exhibits no maxillary sinus tenderness and no frontal sinus tenderness.   Mouth/Throat: No posterior oropharyngeal edema, posterior oropharyngeal erythema or tonsillar abscesses.   Eyes: Pupils are equal, round, and reactive to light.   Neck: Neck supple. No thyromegaly present.   Cardiovascular: Normal rate and regular rhythm.   Pulmonary/Chest: Effort normal and breath sounds normal.   Musculoskeletal: Normal range of motion.        Cervical back: She exhibits pain. She exhibits no tenderness, no bony tenderness, no swelling, no edema, no deformity, no laceration, no spasm and normal pulse.   Neurological: She is alert and oriented to person, place, and time. A " sensory deficit is present.   Skin: Skin is warm and dry. Capillary refill takes 2 to 3 seconds. She is not diaphoretic.        Toenail fungus on left 1st   Psychiatric: She has a normal mood and affect. Judgment and thought content normal. Her speech is rapid and/or pressured. She is hyperactive.   Nursing note and vitals reviewed.      Procedure:      Discussion/Summary:    GERD-f/u EGD  Osteoporosis-off TX sec to GERD  Hyperlipidemia-labs today  Insomnia-cont trazodone  Depression-cont Wellbutrin  Elevated LFTs-recheck today     Reviewed records available  Labs noted and dw patient, counseled on low carb        Current Outpatient Medications:   •  atorvastatin (LIPITOR) 20 MG tablet, Take 1 tablet by mouth Every Night., Disp: 30 tablet, Rfl: 11  •  Biotin 1000 MCG tablet, Take 1,000 mcg by mouth Daily., Disp: , Rfl:   •  buPROPion XL (WELLBUTRIN XL) 150 MG 24 hr tablet, take 1 tablet by mouth every morning, Disp: 30 tablet, Rfl: 5  •  cyclobenzaprine (FLEXERIL) 5 MG tablet, Take 1 tablet by mouth 3 (Three) Times a Day As Needed for Muscle Spasms., Disp: 90 tablet, Rfl: 0  •  diclofenac (VOLTAREN) 1 % gel gel, Apply 4 g topically to the appropriate area as directed 4 (Four) Times a Day., Disp: 100 g, Rfl: 2  •  dicyclomine (BENTYL) 20 MG tablet, take 1 tablet by mouth four times a day if needed, Disp: , Rfl: 0  •  fexofenadine (ALLEGRA ALLERGY) 180 MG tablet, Take 1 tablet by mouth Daily., Disp: 30 tablet, Rfl: 5  •  FLUoxetine (PROzac) 20 MG capsule, Take 2 capsules by mouth Daily., Disp: 60 capsule, Rfl: 11  •  fluticasone (FLONASE) 50 MCG/ACT nasal spray, instill 2 sprays into each nostril once daily, Disp: 15.8 mL, Rfl: 1  •  Magnesium 250 MG tablet, Take 500 mg by mouth Daily., Disp: , Rfl:   •  Multiple Vitamins-Minerals (MULTIVITAMIN WITH MINERALS) tablet tablet, Take 1 tablet by mouth Daily., Disp: , Rfl:   •  Omega-3 Fatty Acids (FISH OIL PO), Take 1 capsule by mouth Daily., Disp: , Rfl:   •  ondansetron  ODT (ZOFRAN-ODT) 4 MG disintegrating tablet, Take 1 tablet by mouth Every 8 (Eight) Hours As Needed for Nausea or Vomiting., Disp: 30 tablet, Rfl: 1  •  pantoprazole (PROTONIX) 40 MG EC tablet, Take 1 tablet by mouth 30 minutes before breakfast daily., Disp: 30 tablet, Rfl: 5  •  Probiotic capsule, Take 1 capsule by mouth Daily., Disp: 30 capsule, Rfl: 1  •  RA VITAMIN D-3 2000 units capsule, take 1 capsule by mouth once daily, Disp: 30 capsule, Rfl: 2  •  tiZANidine (ZANAFLEX) 4 MG tablet, Take 4 mg by mouth At Night As Needed for Muscle Spasms., Disp: , Rfl:   •  traZODone (DESYREL) 50 MG tablet, take 1 tablet by mouth at bedtime if needed for sleep, Disp: 30 tablet, Rfl: 5        Areli was seen today for hyperlipidemia and wrist pain.    Diagnoses and all orders for this visit:    Mixed hyperlipidemia  -     Comprehensive Metabolic Panel  -     Lipid Panel    Allergic rhinitis, unspecified seasonality, unspecified trigger    Gastroesophageal reflux disease with esophagitis    Gastric ulcer without hemorrhage or perforation, unspecified chronicity    Chronic superficial gastritis without bleeding    Osteoporosis, unspecified osteoporosis type, unspecified pathological fracture presence    Osteoarthritis of cervical spine, unspecified spinal osteoarthritis complication status    Other insomnia    Current moderate episode of major depressive disorder, unspecified whether recurrent (CMS/HCC)    Anxiety state    Paresthesia of both hands  -     EMG & Nerve Conduction Test  -     Vitamin B12  -     TSH    Onychomycosis  -     Ambulatory Referral to Podiatry

## 2019-06-11 ENCOUNTER — HOSPITAL ENCOUNTER (OUTPATIENT)
Facility: HOSPITAL | Age: 59
Setting detail: HOSPITAL OUTPATIENT SURGERY
Discharge: HOME | End: 2019-06-11
Attending: INTERNAL MEDICINE | Admitting: INTERNAL MEDICINE

## 2019-06-11 ENCOUNTER — ANESTHESIA (OUTPATIENT)
Dept: GASTROENTEROLOGY | Facility: HOSPITAL | Age: 59
End: 2019-06-11

## 2019-06-11 ENCOUNTER — ANESTHESIA EVENT (OUTPATIENT)
Dept: GASTROENTEROLOGY | Facility: HOSPITAL | Age: 59
End: 2019-06-11

## 2019-06-11 VITALS
SYSTOLIC BLOOD PRESSURE: 98 MMHG | TEMPERATURE: 97.6 F | RESPIRATION RATE: 18 BRPM | OXYGEN SATURATION: 97 % | HEIGHT: 59 IN | BODY MASS INDEX: 24.19 KG/M2 | HEART RATE: 62 BPM | WEIGHT: 120 LBS | DIASTOLIC BLOOD PRESSURE: 70 MMHG

## 2019-06-11 DIAGNOSIS — R11.0 NAUSEA: ICD-10-CM

## 2019-06-11 DIAGNOSIS — A04.8 HELICOBACTER PYLORI INFECTION: ICD-10-CM

## 2019-06-11 DIAGNOSIS — K25.9 GASTRIC ULCER WITHOUT HEMORRHAGE OR PERFORATION, UNSPECIFIED CHRONICITY: ICD-10-CM

## 2019-06-11 DIAGNOSIS — R07.89 OTHER CHEST PAIN: ICD-10-CM

## 2019-06-11 DIAGNOSIS — R12 HEARTBURN: ICD-10-CM

## 2019-06-11 LAB
ALBUMIN SERPL-MCNC: 4.9 G/DL (ref 3.5–5.2)
ALBUMIN/GLOB SERPL: 2.5 G/DL
ALP SERPL-CCNC: 45 U/L (ref 39–117)
ALT SERPL-CCNC: 25 U/L (ref 1–33)
AST SERPL-CCNC: 24 U/L (ref 1–32)
BILIRUB SERPL-MCNC: 0.3 MG/DL (ref 0.2–1.2)
BUN SERPL-MCNC: 9 MG/DL (ref 6–20)
BUN/CREAT SERPL: 13.2 (ref 7–25)
CALCIUM SERPL-MCNC: 9.4 MG/DL (ref 8.6–10.5)
CHLORIDE SERPL-SCNC: 95 MMOL/L (ref 98–107)
CHOLEST SERPL-MCNC: 151 MG/DL (ref 0–200)
CO2 SERPL-SCNC: 28.7 MMOL/L (ref 22–29)
CREAT SERPL-MCNC: 0.68 MG/DL (ref 0.57–1)
GLOBULIN SER CALC-MCNC: 2 GM/DL
GLUCOSE SERPL-MCNC: 103 MG/DL (ref 65–99)
HDLC SERPL-MCNC: 73 MG/DL (ref 40–60)
LDLC SERPL CALC-MCNC: 65 MG/DL (ref 0–100)
POTASSIUM SERPL-SCNC: 4.5 MMOL/L (ref 3.5–5.2)
PROT SERPL-MCNC: 6.9 G/DL (ref 6–8.5)
SODIUM SERPL-SCNC: 135 MMOL/L (ref 136–145)
TRIGL SERPL-MCNC: 66 MG/DL (ref 0–150)
TSH SERPL DL<=0.005 MIU/L-ACNC: 1.28 UIU/ML (ref 0.45–4.5)
VIT B12 SERPL-MCNC: 565 PG/ML (ref 211–946)
VLDLC SERPL CALC-MCNC: 13.2 MG/DL

## 2019-06-11 PROCEDURE — 25010000002 ONDANSETRON PER 1 MG: Performed by: NURSE ANESTHETIST, CERTIFIED REGISTERED

## 2019-06-11 PROCEDURE — 43239 EGD BIOPSY SINGLE/MULTIPLE: CPT | Performed by: INTERNAL MEDICINE

## 2019-06-11 PROCEDURE — 25010000002 PROPOFOL 10 MG/ML EMULSION: Performed by: NURSE ANESTHETIST, CERTIFIED REGISTERED

## 2019-06-11 PROCEDURE — S0260 H&P FOR SURGERY: HCPCS | Performed by: INTERNAL MEDICINE

## 2019-06-11 RX ORDER — LIDOCAINE HYDROCHLORIDE 20 MG/ML
INJECTION, SOLUTION INTRAVENOUS AS NEEDED
Status: DISCONTINUED | OUTPATIENT
Start: 2019-06-11 | End: 2019-06-11 | Stop reason: SURG

## 2019-06-11 RX ORDER — SIMETHICONE 20 MG/.3ML
EMULSION ORAL AS NEEDED
Status: DISCONTINUED | OUTPATIENT
Start: 2019-06-11 | End: 2019-06-11 | Stop reason: HOSPADM

## 2019-06-11 RX ORDER — ONDANSETRON 2 MG/ML
INJECTION INTRAMUSCULAR; INTRAVENOUS AS NEEDED
Status: DISCONTINUED | OUTPATIENT
Start: 2019-06-11 | End: 2019-06-11 | Stop reason: SURG

## 2019-06-11 RX ORDER — ACETAMINOPHEN/DIPHENHYDRAMINE 500MG-25MG
TABLET ORAL
Qty: 30 TABLET | Refills: 2 | OUTPATIENT
Start: 2019-06-11

## 2019-06-11 RX ORDER — PROPOFOL 10 MG/ML
VIAL (ML) INTRAVENOUS AS NEEDED
Status: DISCONTINUED | OUTPATIENT
Start: 2019-06-11 | End: 2019-06-11 | Stop reason: SURG

## 2019-06-11 RX ORDER — SODIUM CHLORIDE 9 MG/ML
70 INJECTION, SOLUTION INTRAVENOUS CONTINUOUS PRN
Status: DISCONTINUED | OUTPATIENT
Start: 2019-06-11 | End: 2019-06-11 | Stop reason: HOSPADM

## 2019-06-11 RX ORDER — METOCLOPRAMIDE 5 MG/1
2.5 TABLET ORAL
Qty: 30 TABLET | Refills: 1 | Status: SHIPPED | OUTPATIENT
Start: 2019-06-11 | End: 2019-07-23 | Stop reason: SDUPTHER

## 2019-06-11 RX ADMIN — ONDANSETRON 4 MG: 2 INJECTION INTRAMUSCULAR; INTRAVENOUS at 08:16

## 2019-06-11 RX ADMIN — PROPOFOL 50 MG: 10 INJECTION, EMULSION INTRAVENOUS at 08:24

## 2019-06-11 RX ADMIN — PROPOFOL 100 MG: 10 INJECTION, EMULSION INTRAVENOUS at 08:20

## 2019-06-11 RX ADMIN — PROPOFOL 100 MG: 10 INJECTION, EMULSION INTRAVENOUS at 08:30

## 2019-06-11 RX ADMIN — SODIUM CHLORIDE 70 ML/HR: 9 INJECTION, SOLUTION INTRAVENOUS at 07:09

## 2019-06-11 RX ADMIN — LIDOCAINE HYDROCHLORIDE 80 MG: 20 INJECTION, SOLUTION INTRAVENOUS at 08:20

## 2019-06-11 RX ADMIN — PROPOFOL 50 MG: 10 INJECTION, EMULSION INTRAVENOUS at 08:26

## 2019-06-11 NOTE — OP NOTE
PROCEDURE:  Upper Endoscopy with biopsies.    DATE OF PROCEDURE: June 11, 2019.    REFERRING PROVIDER:  Dawson Proctor MD.     INSTRUMENT: Olympus GIF H 190 video endoscope     INDICATIONS OF THE PROCEDURE: This is a 59-year-old female with history of reflux, nausea, atypical chest pains, intermittent dysphagia and gastric ulcer which has been treated.  Currently undergoing upper endoscopy for further evaluation.     BIOPSIES: Second portion of duodenum.  Gastric antrum, body and fundus.  Biopsies were obtained from the mid and distal esophagus.     MEDICATIONS:  MAC.     PHOTOGRAPHS:  Photographs were included in the medical records.     CONSENT/PREPROCEDURE EVALUATION:  Risks, benefits, alternatives and options of the procedure including risks of anesthesia/sedation were discussed and informed consent was obtained prior to the procedure. History and physical examination were performed and nothing precluded the test.     REPORT:  The patient was placed in left lateral decubitus position. Once under the influence of IV sedation, the instrument was inserted into the mouth and esophagus was intubated under direct vision without difficulty.     Esophagus: Localized areas of gastric type mucosa was seen within the proximal esophagus likely representing inlet patches.  These were not biopsied.  Z line was noted to be around  36 cm.  Erythematous distal esophagitis was seen.  Biopsies were obtained from the mid and distal esophagus.  A small sliding hiatal hernia less than 3 cm was noted.  No Stinson's esophagus was seen.     Stomach:  Antrum:  Erythematous gastritis.  Angulus, lesser and greater curves: Normal.  Retroflex examination: Sliding hiatal hernia.  Cardia and fundus:  Normal.     Body of the stomach: Erythematous gastritis.  Good distensibility of the stomach was achieved no giant folds were noted.   Biopsies were obtained from the gastric antrum,  body and fundus.    Pylorus and pyloric channel:   Normal.    Complete healing of gastric ulcer was seen.     Duodenum:  Bulb: Normal.  Second portion: normal.  No scalloping was seen in the second portion of duodenum.  Biopsies were obtained from the second portion of duodenum.    Intervention:  None.       The upper GI tract was decompressed and the scope was pulled out of the patient. The patient tolerated the procedure well.     DIAGNOSES:     1. Erythematous distal esophagitis.  No Stinson's esophagus.  2. A small sliding hiatal hernia less than 3 cm.  3. Erythematous gastritis.  4. Complete healing of gastric ulcer.  5. Localized areas of gastric type mucosa within the proximal esophagus likely representing inlet patches.    RECOMMENDATIONS:  1.  Dietary instructions.  2.  Pantoprazole 40 mg 1 p.o. q.a.m. 1/2 hour before breakfast.  3.  Follow biopsies.  4.  Follow up in office.  5.  Short course of low-dose prokinetic agent such as Reglan.     Thank you very much for letting me participate in the care of this patient. Please do not hesitate to call me if you have any questions.

## 2019-06-11 NOTE — ANESTHESIA PREPROCEDURE EVALUATION
Anesthesia Evaluation     Patient summary reviewed and Nursing notes reviewed   no history of anesthetic complications:  NPO Solid Status: > 8 hours  NPO Liquid Status: > 8 hours           Airway   Mallampati: I  TM distance: >3 FB  Neck ROM: full  no difficulty expected  Dental - normal exam     Pulmonary - normal exam   (+) a smoker Former,     ROS comment: Quit smoking 6 weeks ago  Cardiovascular - normal exam    Rhythm: regular  Rate: normal    (+) valvular problems/murmurs murmur, hyperlipidemia,     ROS comment: Echo 06/2018  Echocardiogram Findings     Left Ventricle Left ventricular systolic function is normal. Estimated EF appears to be in the range of 66 - 70%. Estimated EF = 68%. Normal left ventricular cavity size and wall thickness noted. All left ventricular wall segments contract normally. Left ventricular diastolic function is normal. Normal left atrial pressure. There is no evidence of a left ventricular mass or thrombus present.  Right Ventricle Normal right ventricular cavity size, wall thickness, systolic function and septal motion noted. No evidence of a right ventricular thrombus present. No evidence of a right ventricular mass present.  Left Atrium Normal left atrial size and volume noted. No evidence of a left atrial thrombus present. No evidence of a left atrial mass present. appear normal with no flow abnormalities.  Right Atrium The inferior vena cava is normally sized. Normal IVC inspiratory collapse of greater than 50% noted. Normal IVC flow pattern noted. The superior vena cava is normaly sized. No evidence of a right atrial thrombus present. No evidence of a right atrial mass present.  Aortic Valve The aortic valve is structurally normal. The valve appears trileaflet. No aortic valve regurgitation is present. No aortic valve stenosis is present.  Mitral Valve The mitral valve is normal in structure. No mitral valve regurgitation is present. No significant mitral valve stenosis is  present.  Tricuspid Valve The tricuspid valve is normal. No tricuspid valve stenosis is present. Physiologic tricuspid valve regurgitation is present. Estimated right ventricular systolic pressure from tricuspid regurgitation is normal (<35 mmHg). No evidence of pulmonary hypertension is present.  Pulmonic Valve The pulmonic valve is structurally normal. There is no significant pulmonic valve stenosis present. There is no pulmonic valve regurgitation present.  Greater Vessels No dilation of the aortic root is present. No dilation of the sinuses of Valsalva is present.  Pericardium The pericardium is normal. There is no evidence of pericardial effusion.        Neuro/Psych  (+) dizziness/light headedness, numbness, psychiatric history Anxiety and Depression,     GI/Hepatic/Renal/Endo    (+)  GERD,      Musculoskeletal     Abdominal  - normal exam    Abdomen: soft.  Bowel sounds: normal.   Substance History   (+) alcohol use,      OB/GYN negative ob/gyn ROS         Other   (+) arthritis                       Anesthesia Plan    ASA 2     MAC   (Risks and benefits discussed including risk of aspiration, recall and dental damage. All patient questions answered. Will continue with POC.)  intravenous induction   Anesthetic plan, all risks, benefits, and alternatives have been provided, discussed and informed consent has been obtained with: patient.

## 2019-06-11 NOTE — ANESTHESIA POSTPROCEDURE EVALUATION
Patient: Areli Barnett    Procedure Summary     Date:  06/11/19 Room / Location:  [facility] ENDOSCOPY 2 / [facility] ENDOSCOPY    Anesthesia Start:  0816 Anesthesia Stop:      Procedure:  ESOPHAGOGASTRODUODENOSCOPY W/ COLD FORCEP BIOPSIES (N/A Esophagus) Diagnosis:       Other chest pain      Heartburn      Nausea      Gastric ulcer without hemorrhage or perforation, unspecified chronicity      Helicobacter pylori infection      (Other chest pain [R07.89])      (Heartburn [R12])      (Nausea [R11.0])      (Gastric ulcer without hemorrhage or perforation, unspecified chronicity [K25.9])      (Helicobacter pylori infection [A04.8])    Surgeon:  Brenden Steward MD Provider:  Iker Anthony CRNA    Anesthesia Type:  MAC ASA Status:  2          Anesthesia Type: MAC  Last vitals  BP   111/59   Temp   98   Pulse   79   Resp   20   SpO2   96     Post Anesthesia Care and Evaluation    Patient location during evaluation: bedside  Patient participation: complete - patient participated  Level of consciousness: awake and alert  Pain score: 0  Pain management: adequate  Airway patency: patent  Anesthetic complications: No anesthetic complications  PONV Status: none  Cardiovascular status: acceptable  Respiratory status: acceptable and nasal cannula  Hydration status: acceptable

## 2019-06-11 NOTE — H&P
"Chief complaint:  Nausea/Heartburn/CP/Dysphagia    History of present illness:  Retrosternal Chest Pain, Nausea, Heartburn, Dysphagia, Periumbilical Abdominal pain     Past medical history:   Past Medical History:   Diagnosis Date   • Anemia    • Arthritis    • Body piercing     EARS   • Depression    • Dysphagia     REPORTS SOMETIMES SHE HAS PAIN WHEN SWALLOWING FOOD   • Elevated cholesterol    • Elevated LFTs    • Epigastric pain    • Exposure to TB     REPORTS MANY YEARS AGO. REPORTS TB TESTING HAS ALWAYS BEEN NEGATIVE.    • GERD (gastroesophageal reflux disease)    • History of bronchitis 2019   • History of chest pain     REPORTS FALL  AND THAT SHE HAD TESTING THAT WAS WNL'S. REPORTS SHE IS NOW BEING CHECKED FOR GI.    • History of exercise stress test     2018 - REPORTS WAS TOLD ALL WNL'S    • History of fracture     TOE   • Hyperlipidemia    • Latex allergy    • Murmur     REPORTS \"A SLIGHT MURMUR\"   • Osteoporosis    • Plantar fasciitis    • Seasonal allergies    • Vertigo    • Wears glasses     PRN       Surgical history:    Past Surgical History:   Procedure Laterality Date   • ABDOMINOPLASTY     • AUGMENTATION MAMMAPLASTY Bilateral    • COLONOSCOPY  2016   • ENDOSCOPY N/A 2019    Procedure: ESOPHAGOGASTRODUODENOSCOPY with cold biopsy and esophageal dilation;  Surgeon: Brenden Steward MD;  Location: [facility] ENDOSCOPY;  Service: Gastroenterology   • UPPER GASTROINTESTINAL ENDOSCOPY  2019   • WISDOM TOOTH EXTRACTION         Social history:  Social History     Socioeconomic History   • Marital status: Single     Spouse name: Not on file   • Number of children: Not on file   • Years of education: Not on file   • Highest education level: Not on file   Tobacco Use   • Smoking status: Former Smoker     Packs/day: 0.25     Years: 5.00     Pack years: 1.25     Types: Cigarettes     Last attempt to quit: 2019     Years since quittin.3   • Smokeless tobacco: Never Used   Substance " and Sexual Activity   • Alcohol use: Yes     Comment: SOCIAL USE, NO HISTORY OF ABUSE REPORTED   • Drug use: No   • Sexual activity: Defer       Allergies:  Latex and Penicillins  Latex allergy: YES  Contrast allergy: None    Medications:  Medications Prior to Admission   Medication Sig Dispense Refill Last Dose   • atorvastatin (LIPITOR) 20 MG tablet Take 1 tablet by mouth Every Night. 30 tablet 11 6/10/2019 at 2100   • Biotin 1000 MCG tablet Take 1,000 mcg by mouth Daily.   6/10/2019 at 2100   • buPROPion XL (WELLBUTRIN XL) 150 MG 24 hr tablet take 1 tablet by mouth every morning 30 tablet 5 6/10/2019 at 2100   • cyclobenzaprine (FLEXERIL) 5 MG tablet Take 1 tablet by mouth 3 (Three) Times a Day As Needed for Muscle Spasms. 90 tablet 0 Past Week at Unknown time   • diclofenac (VOLTAREN) 1 % gel gel Apply 4 g topically to the appropriate area as directed 4 (Four) Times a Day. 100 g 2 6/10/2019 at 0800   • fexofenadine (ALLEGRA ALLERGY) 180 MG tablet Take 1 tablet by mouth Daily. 30 tablet 5 6/10/2019 at 0800   • FLUoxetine (PROzac) 20 MG capsule Take 2 capsules by mouth Daily. 60 capsule 11 6/10/2019 at 1700   • fluticasone (FLONASE) 50 MCG/ACT nasal spray instill 2 sprays into each nostril once daily 15.8 mL 1 6/10/2019 at 1700   • Magnesium 250 MG tablet Take 500 mg by mouth Daily.   6/10/2019 at 1700   • Multiple Vitamins-Minerals (MULTIVITAMIN WITH MINERALS) tablet tablet Take 1 tablet by mouth Daily.   6/10/2019 at 1700   • Omega-3 Fatty Acids (FISH OIL PO) Take 1 capsule by mouth Daily.   6/10/2019 at 1700   • ondansetron ODT (ZOFRAN-ODT) 4 MG disintegrating tablet Take 1 tablet by mouth Every 8 (Eight) Hours As Needed for Nausea or Vomiting. 30 tablet 1 Past Month at Unknown time   • pantoprazole (PROTONIX) 40 MG EC tablet Take 1 tablet by mouth 30 minutes before breakfast daily. 30 tablet 5 6/10/2019 at 0800   • RA VITAMIN D-3 2000 units capsule take 1 capsule by mouth once daily 30 capsule 2 6/10/2019 at  "1700   • tiZANidine (ZANAFLEX) 4 MG tablet Take 4 mg by mouth At Night As Needed for Muscle Spasms.   Past Week at Unknown time   • traZODone (DESYREL) 50 MG tablet take 1 tablet by mouth at bedtime if needed for sleep 30 tablet 5 Past Week at Unknown time   • dicyclomine (BENTYL) 20 MG tablet take 1 tablet by mouth four times a day if needed  0 Unknown at Unknown time   • Probiotic capsule Take 1 capsule by mouth Daily. 30 capsule 1 Unknown at Unknown time       Review of systems:   Constitutional: No recent: Fever, Weight loss   Respiratory: No recent: SOB  Cardiovascular: No recent: Congestive heart failure.  Neurological: No recent: Seizures, CVA, TIA.   Genitourinary: No recent: Renal Failure, UTI.  Endocrine: No recent: Worsening of diabetes or thyroid disease.  Musculoskeletal: No recent: Joint swelling.  Hem. Oncology: No recent: Blleeding.  Psychiatric: No recent: Worsening of depression or anxiety.     VITAL SIGNS:    Blood pressure 127/89, pulse 76, temperature 98.7 °F (37.1 °C), temperature source Temporal, resp. rate 18, height 149.9 cm (59\"), weight 54.4 kg (120 lb), SpO2 97 %, not currently breastfeeding.    PHYSICAL EXAMINATION:   HEENT: Normal.   Lungs: Clear to auscultation.  Heart: No S3, no murmur.    Abdomen: Soft.  BS+ ND, NT  Extremities: No edema.  No cyanosis.  Neuro: Alert X 3. No focal deficit.    Assessment: Retrosternal Chest Pain, Nausea, Heartburn, Dysphagia, Periumbilical Abdominal pain     Plan:   Upper Endoscopy    Risks/Benefits:  The potential benefits, risk and/or side effects of the procedure and alternatives have been discussed with the patient/authorized representative and questions were answered.    "

## 2019-06-11 NOTE — DISCHARGE INSTRUCTIONS
No pushing, pulling, tugging,  heavy lifting, or strenuous activity.  No major decision making, driving, or drinking alcoholic beverages for 24 hours. ( due to the medications you have  received)  Always use good hand hygiene/washing techniques.  NO driving while taking pain medications.    To assist you in voiding:  Drink plenty of fluids  Listen to running water while attempting to void.    If you are unable to urinate and you have an uncomfortable urge to void or it has been   6 hours since you were discharged, return to the Emergency Room    *******************************************************    Postprocedure instructions.  1. Nothing by mouth for 30 min.  2. Clear liquids diet (No Sodas) for 1 hours.  3. May advance to soft low-fat diet  in 2 hours       Diet otherwise:    1. Low fat diet.    2. Avoid soda beverages, excessive use of coffee and tea.   3. Avoid smoking and alcohol.      Other Instructions:  [facility] at [phone] or come to the Emergency Department if you experience the following: Chest pain, abdominal pain, bleeding (vomiting of blood or coffee colored material, black stools or emily blood in stools),   fever/chills, nausea and vomiting or dizziness.    Follow-up:    Dr. Steward in 3-4 weeks.   Office phone #[phone].   If you already have an appointment just keep that appointment.    ************************************************************************************    Notes to the patient and the family from Dr. Steward.    Dear patient/family member,    Findings on today's procedure are as follows:  1. Esophagitis. Inflammation of the esophagus.  2. Inflammation of the stomach. Gastritis.    3. Small sliding hiatal hernia.    4. No cancer. No active ulcers.    Recommendations:    1. Protonix (Pantoprazole) tablet 40 mg tablet. Take 1 tablet orally in the morning half an hour before eating every day.  2. Zofran 4 mg tablet.  1-to 3 times a day by mouth as needed  for nausea.   3. Other instructions as above.      Should you have more questions please do not hesitate to talk to the nurse who can call me and let me talk to you.      I hope you feel better.    Brenden Steward M.D., FACP, FACG.

## 2019-06-13 ENCOUNTER — TELEPHONE (OUTPATIENT)
Dept: INTERNAL MEDICINE | Facility: CLINIC | Age: 59
End: 2019-06-13

## 2019-06-13 RX ORDER — FLUTICASONE PROPIONATE 50 MCG
SPRAY, SUSPENSION (ML) NASAL
Qty: 15.8 ML | Refills: 1 | Status: SHIPPED | OUTPATIENT
Start: 2019-06-13 | End: 2019-09-03 | Stop reason: SDUPTHER

## 2019-06-13 NOTE — TELEPHONE ENCOUNTER
PATIENT CALLED AND SAID HER EARS ARE ITCHY AND HE NOSE IS SORE AND WANTED TO KNOW IF YOU WOULD REFER HER TO AN ENT? PATIENT IS ALSO NEEDING A REFILL FOR FLONASE SENT INTO [address].

## 2019-06-15 LAB
LAB AP CASE REPORT: NORMAL
PATH REPORT.FINAL DX SPEC: NORMAL

## 2019-07-23 ENCOUNTER — OFFICE VISIT (OUTPATIENT)
Dept: GASTROENTEROLOGY | Facility: CLINIC | Age: 59
End: 2019-07-23

## 2019-07-23 VITALS
SYSTOLIC BLOOD PRESSURE: 129 MMHG | DIASTOLIC BLOOD PRESSURE: 71 MMHG | RESPIRATION RATE: 16 BRPM | TEMPERATURE: 96.9 F | BODY MASS INDEX: 23.39 KG/M2 | WEIGHT: 116 LBS | HEART RATE: 66 BPM | HEIGHT: 59 IN

## 2019-07-23 DIAGNOSIS — R12 HEARTBURN: ICD-10-CM

## 2019-07-23 DIAGNOSIS — R07.89 OTHER CHEST PAIN: Primary | ICD-10-CM

## 2019-07-23 DIAGNOSIS — R10.33 PERIUMBILICAL ABDOMINAL PAIN: ICD-10-CM

## 2019-07-23 PROCEDURE — 99214 OFFICE O/P EST MOD 30 MIN: CPT | Performed by: INTERNAL MEDICINE

## 2019-07-23 RX ORDER — METOCLOPRAMIDE 5 MG/1
2.5 TABLET ORAL
Qty: 30 TABLET | Refills: 1 | Status: SHIPPED | OUTPATIENT
Start: 2019-07-23 | End: 2019-08-26

## 2019-07-23 NOTE — PATIENT INSTRUCTIONS
1. Anti-reflex measures: Low fat diet. Limit: Coffee, Chocolate and Fried Foods.   Avoid:  Sodas, High Fat Foods, Cookies, Excessive Tomato or Onions based foods,  Alcohol and Smoking).  2. Low-fat-high-fiber diet with liberal water intake.    3. Protonix (Pantoprazole) tablet 40 mg tablet. Take 1 tablet orally in the morning half an hour before eating every day.  4. Reglan (metoclopramide) 5 mg tablets.  Take one half tablet (2.5 mg) by mouth in the morning and in the evening (2 times daily preferably half an hour before food).  5. Desyrel-trazodone 50 mg 1 p.o. nightly on regular basis.  6. The patient may call back and couple of weeks regarding progress.  Otherwise for follow-up the patient may call back.

## 2019-07-23 NOTE — PROGRESS NOTES
Chief Complaint   Patient presents with   • Chest Pain     History of Present Illness       The patient been having some retrosternal chest pains off and on for the last 2 year or so. The pain is described as mild tightness-squeeze-type feeling perhaps 3-4 times a week. The pain is nonexertional and may last for 5 seconds or so. Patient had undergone some cardiac workup that included echocardiogram and a stress test. Upon review of records dated June 19, 2018 the patient had undergone a normal stress test following Vinod protocol. The patient achieved a target heart rate without symptoms or EKG changes.     The patient has a history of reflux off and on for the last 1 year .  The reflux is mildly severe.  Symptoms are described as retrosternal burning sensation, and indigestion.  There is no history of occasional regurgitative symptoms.  Frequency being 1-2 per week.  The symptoms are worse at night.  She'll talk Prilosec with improvement of her symptoms.  The patient denies further nausea.  There is no dysphagia.    Recently, the patient has stopped taking low-dose Reglan.  She has been somewhat constipated for the couple of days.  She was having good bowel movements while taking Reglan.     The patient has history of periumbilical abdominal cramping off and on for the last few years. She would feel constipated and then would have some explosive diarrhea. The patient would also break and 2 cold sweat and use to feel nauseated. The patient has taken Bentyl-dicyclomine with improvement of her symptoms. Now she takes on occasions perhaps once in 1-2 months.     Review of Systems   Constitutional: Negative for appetite change, chills, fatigue, fever and unexpected weight change.   HENT: Negative for mouth sores, nosebleeds and trouble swallowing.    Eyes: Negative for discharge and redness.   Respiratory: Negative for apnea, cough and shortness of breath.    Cardiovascular: Positive for chest pain. Negative for  palpitations and leg swelling.   Gastrointestinal: Positive for constipation. Negative for abdominal distention, abdominal pain, anal bleeding, blood in stool, diarrhea, nausea and vomiting.   Endocrine: Negative for cold intolerance, heat intolerance and polydipsia.   Genitourinary: Negative for dysuria, hematuria and urgency.   Musculoskeletal: Negative for arthralgias, joint swelling and myalgias.   Skin: Negative for rash.   Allergic/Immunologic: Negative for food allergies and immunocompromised state.   Neurological: Negative for dizziness, seizures, syncope and headaches.   Hematological: Negative for adenopathy. Does not bruise/bleed easily.   Psychiatric/Behavioral: Negative for dysphoric mood. The patient is nervous/anxious. The patient is not hyperactive.      Patient Active Problem List   Diagnosis   • Allergic rhinitis   • Anxiety state   • Hyperlipidemia   • Osteoporosis   • Gastroesophageal reflux disease with esophagitis   • Other insomnia   • Depressed   • Nausea   • Dysphagia   • Heartburn   • Periumbilical abdominal pain   • Chest pain   • Osteoarthritis   • Chronic superficial gastritis without bleeding   • Ganglion cyst of wrist, left   • Esophageal stricture   • Gastric ulcer without hemorrhage or perforation   • Inlet patch of esophagus   • Helicobacter pylori infection   • Other chest pain   • Paresthesia of both hands     Past Medical History:   Diagnosis Date   • Anemia    • Arthritis    • Body piercing     EARS   • Depression    • Dysphagia     REPORTS SOMETIMES SHE HAS PAIN WHEN SWALLOWING FOOD   • Elevated cholesterol    • Elevated LFTs    • Epigastric pain    • Exposure to TB     REPORTS MANY YEARS AGO. REPORTS TB TESTING HAS ALWAYS BEEN NEGATIVE.    • GERD (gastroesophageal reflux disease)    • History of bronchitis 01/2019   • History of chest pain     REPORTS FALL 2018 AND THAT SHE HAD TESTING THAT WAS WNL'S. REPORTS SHE IS NOW BEING CHECKED FOR GI.    • History of exercise stress  "test     2018 - REPORTS WAS TOLD ALL WNL'S    • History of fracture     TOE   • Hyperlipidemia    • Latex allergy    • Murmur     REPORTS \"A SLIGHT MURMUR\"   • Osteoporosis    • Plantar fasciitis    • Seasonal allergies    • Vertigo    • Wears glasses     PRN     Past Surgical History:   Procedure Laterality Date   • ABDOMINOPLASTY  2003   • AUGMENTATION MAMMAPLASTY Bilateral    • COLONOSCOPY  2016   • ENDOSCOPY N/A 2019    Procedure: ESOPHAGOGASTRODUODENOSCOPY with cold biopsy and esophageal dilation;  Surgeon: Brenden Steward MD;  Location: [facility] ENDOSCOPY;  Service: Gastroenterology   • ENDOSCOPY N/A 2019    Procedure: ESOPHAGOGASTRODUODENOSCOPY W/ COLD FORCEP BIOPSIES;  Surgeon: Brenden Steward MD;  Location: [facility] ENDOSCOPY;  Service: Gastroenterology   • UPPER GASTROINTESTINAL ENDOSCOPY  2019   • WISDOM TOOTH EXTRACTION       Family History   Problem Relation Age of Onset   • Breast cancer Maternal Aunt    • Lung cancer Mother    • Heart attack Father    • Crohn's disease Sister    • Stomach cancer Paternal Grandfather    • Alcohol abuse Paternal Grandfather    • Colon cancer Neg Hx    • Cirrhosis Neg Hx    • Liver disease Neg Hx    • Liver cancer Neg Hx    • Ulcerative colitis Neg Hx    • Esophageal cancer Neg Hx      Social History     Tobacco Use   • Smoking status: Former Smoker     Packs/day: 0.25     Years: 5.00     Pack years: 1.25     Types: Cigarettes     Last attempt to quit: 2019     Years since quittin.4   • Smokeless tobacco: Never Used   Substance Use Topics   • Alcohol use: Yes     Comment: SOCIAL USE, NO HISTORY OF ABUSE REPORTED       Current Outpatient Medications:   •  atorvastatin (LIPITOR) 20 MG tablet, Take 1 tablet by mouth Every Night., Disp: 30 tablet, Rfl: 11  •  buPROPion XL (WELLBUTRIN XL) 150 MG 24 hr tablet, take 1 tablet by mouth every morning, Disp: 30 tablet, Rfl: 5  •  diclofenac (VOLTAREN) 1 % gel gel, Apply 4 g topically to the appropriate " "area as directed 4 (Four) Times a Day., Disp: 100 g, Rfl: 2  •  fexofenadine (ALLEGRA ALLERGY) 180 MG tablet, Take 1 tablet by mouth Daily., Disp: 30 tablet, Rfl: 5  •  Flax oil, Take  by mouth., Disp: , Rfl:   •  FLUoxetine (PROzac) 20 MG capsule, Take 2 capsules by mouth Daily., Disp: 60 capsule, Rfl: 11  •  fluticasone (FLONASE) 50 MCG/ACT nasal spray, 2 sprays into each nostril once daily, Disp: 15.8 mL, Rfl: 1  •  Magnesium 250 MG tablet, Take 500 mg by mouth Daily., Disp: , Rfl:   •  metoclopramide (REGLAN) 5 MG tablet, Take 1/2 tablet by mouth 2 (Two) Times a Day Before Meals., Disp: 30 tablet, Rfl: 1  •  Multiple Vitamins-Minerals (MULTIVITAMIN WITH MINERALS) tablet tablet, Take 1 tablet by mouth Daily., Disp: , Rfl:   •  ondansetron ODT (ZOFRAN-ODT) 4 MG disintegrating tablet, Take 1 tablet by mouth Every 8 (Eight) Hours As Needed for Nausea or Vomiting., Disp: 30 tablet, Rfl: 1  •  pantoprazole (PROTONIX) 40 MG EC tablet, Take 1 tablet by mouth 30 minutes before breakfast daily., Disp: 30 tablet, Rfl: 5  •  RA VITAMIN D-3 2000 units capsule, take 1 capsule by mouth once daily, Disp: 30 capsule, Rfl: 2  •  traZODone (DESYREL) 50 MG tablet, take 1 tablet by mouth at bedtime if needed for sleep, Disp: 30 tablet, Rfl: 5    Allergies   Allergen Reactions   • Latex Rash   • Penicillins Rash       Blood pressure 129/71, pulse 66, temperature 96.9 °F (36.1 °C), resp. rate 16, height 149.9 cm (59\"), weight 52.6 kg (116 lb), not currently breastfeeding.    Physical Exam   Constitutional: She is oriented to person, place, and time. She appears well-developed and well-nourished. No distress.   HENT:   Head: Normocephalic and atraumatic.   Right Ear: Hearing and external ear normal.   Left Ear: Hearing and external ear normal.   Nose: Nose normal.   Mouth/Throat: Oropharynx is clear and moist and mucous membranes are normal. Mucous membranes are not pale, not dry and not cyanotic. No oral lesions. No oropharyngeal " exudate.   Eyes: Conjunctivae and EOM are normal. Right eye exhibits no discharge. Left eye exhibits no discharge. No scleral icterus.   Neck: Trachea normal. Neck supple. No JVD present. No edema present. No thyroid mass and no thyromegaly present.   Cardiovascular: Normal rate, regular rhythm, S2 normal and normal heart sounds. Exam reveals no gallop, no S3 and no friction rub.   No murmur heard.  Pulmonary/Chest: Effort normal and breath sounds normal. No respiratory distress. She has no wheezes. She has no rales. She exhibits no tenderness.   Abdominal: Soft. Normal appearance and bowel sounds are normal. She exhibits no distension, no ascites and no mass. There is no splenomegaly or hepatomegaly. There is no tenderness. There is no rigidity, no rebound and no guarding. No hernia.   Musculoskeletal: She exhibits no tenderness or deformity.     Vascular Status -  Her right foot exhibits no edema. Her left foot exhibits no edema.  Lymphadenopathy:     She has no cervical adenopathy.        Left: No supraclavicular adenopathy present.   Neurological: She is alert and oriented to person, place, and time. She has normal strength. No cranial nerve deficit or sensory deficit. She exhibits normal muscle tone. Coordination normal.   Skin: No rash noted. She is not diaphoretic. No cyanosis. No pallor. Nails show no clubbing.   Psychiatric: She has a normal mood and affect. Her behavior is normal. Judgment and thought content normal.   Nursing note and vitals reviewed.  Stigmata of chronic liver disease:  None.  Asterixis:  None.    Laboratory Testing:  Upon review of medical records:    Dated October 1, 2018 sodium 137 potassium 4.4 chloride 101 CO2 28 BUN 15 serum creatinine 0.81 and glucose 82.  Calcium 9.2.  Total protein 6.5.  Albumin 4.60.  T bili 0.3 AST 50 ALT 58 alkaline phosphatase 61.  TSH 1.507.  Vitamin B12 level 760.  WBC 11.32 hemoglobin 13.0 hematocrit 39.7 MCV 90.8 and platelet count 290.  Hepatitis A IgM  negative.  Hepatitis B surface antigen negative.  Hepatitis B core IgM negative.  Hepatitis C virus antibody negative at <0.1.     Dated 2/4/2019 glucose 95 BUN 15 creatinine 0.83 sodium 137 potassium 4.3 chloride 104 CO2 28 calcium 9.2 albumin 4.54 total bilirubin 0.5 alkaline phosphatase 44 AST 35 ALT 46 TSH 1.646    Dated Suellen 10, 2019 sodium 135 potassium 4.5 chloride 95 CO2 28.7 BUN 9 serum creatinine 0.68 glucose 103.  Calcium 9.4.  Total protein 6.9.  Albumin 4.90.  T bili 0.3 AST 24 ALT 25 alkaline phosphatase 45.  Total cholesterol 151.  Triglycerides 66 TSH 1.280.  Vitamin B12 level 565.     Procedures:  Upon review of records:     Dated February 2, 2016 the patient underwent a colonoscopy to the terminal ileum which revealed: Colon polyps.  Scant vascular ectasia-nonbleeding.  Internal hemorrhoids.  Rectal polyps, biopsy revealed hyperplastic polyp fragments.  Cecum polyps, biopsy revealed polypoid colon mucosa with surface reactive epithelial changes. Negative for adenoma or hyperplasia.     EGD dated 2/12/2019 reveals distal esophageal stricture.  Status post serial dilation to 18 mm.  Erosive distal esophagitis.  LA class A.  Small sliding hiatal hernia less than 3 cm.  Erythematous erosive gastritis.  0.75 cm clean-based gastric antral ulcer.  Risks of bleeding from clean-based gastric ulcer less than 5%.  Erythematous erosive bulbar duodenitis as well as proximal second portion of duodenum.  Localized areas of gastric type mucosa within the proximal esophagus consistent with inlet patches.  Proximal esophageal ring.  Nonobstructive.  Subtle concentric rings within the mid and distal esophagus.  Duodenum second portion biopsy reveals benign duodenal mucosa with no focal nonspecific reactive changes.  Duodenum second portion biopsy reveals benign duodenal mucosa with no diagnostic abnormality.  Gastric antrum ulcer biopsy reveals marked chronic active gastritis with ulcer slough.  H. pylori positive.   Antrum body and fundus biopsy reveals chronic focally active gastritis.  H. pylori positive.  Mid and distal esophagus biopsy reveals reflux esophagitis with focal erosion.  Chronic Gastritis.  H. pylori positive.  Negative for intestinal metaplasia.  Proximal esophagus biopsy reveals inlet patch showing chronic gastritis with lymphoid aggregate.     Dated June 11, 2019 the patient underwent upper endoscopy which revealed: Erythematous distal esophagitis.  No Stinson's esophagus.  Small sliding hiatal hernia less than 3 cm.  Erythematous gastritis.  Complete healing of gastric ulcer.  Localized area of gastric type mucosa within the proximal esophagus likely representing inlet patches.  Second portion of duodenum, biopsy revealed duodenal type mucosa with no significant histopathologic abnormalities.  Antrum, body and fundus, biopsies revealed gastric antral and fundic type mucosa with moderate chronic inactive gastritis.  Negative for intestinal metaplasia or dysplasia.  Helicobacter pylori immunohistochemistry stain with good control is performed and is negative.  Distal esophagus, biopsy revealed glandular mucosa with mild chronic inflammation.  Negative for squamous type mucosa, intestinal metaplasia or dysplasia.  Negative for specific microorganisms.  Mid and distal esophagus, biopsies revealed squamous mucosa with mild nonspecific reactive changes.  Glandular mucosa with moderate chronic inflammation.  Negative for intestinal metaplasia or dysplasia.  Negative for specific microorganisms.    Assessment:      ICD-10-CM ICD-9-CM   1. Other chest pain R07.89 786.59   2. Heartburn R12 787.1   3. Periumbilical abdominal pain R10.33 789.05         Discussion:  1.     Plan/  Patient Instructions   1. Anti-reflex measures: Low fat diet. Limit: Coffee, Chocolate and Fried Foods.   Avoid:  Sodas, High Fat Foods, Cookies, Excessive Tomato or Onions based foods,  Alcohol and Smoking).  2. Low-fat-high-fiber diet with  liberal water intake.    3. Protonix (Pantoprazole) tablet 40 mg tablet. Take 1 tablet orally in the morning half an hour before eating every day.  4. Reglan (metoclopramide) 5 mg tablets.  Take one half tablet (2.5 mg) by mouth in the morning and in the evening (2 times daily preferably half an hour before food).  5. Desyrel-trazodone 50 mg 1 p.o. nightly on regular basis.  6. The patient may call back and couple of weeks regarding progress.  Otherwise for follow-up the patient may call back.         Brenden Steward MD

## 2019-08-01 ENCOUNTER — TELEPHONE (OUTPATIENT)
Dept: GASTROENTEROLOGY | Facility: CLINIC | Age: 59
End: 2019-08-01

## 2019-08-01 NOTE — TELEPHONE ENCOUNTER
Pharmacy calls, wants to change Protonix to Raniditine or Famotidine because of cost,: per Elaine,-pt was put on Protonix because of ulcer, could try nexium or prilosec.

## 2019-08-05 RX ORDER — GLUCOSAMINE/CHONDR SU A SOD 750-600 MG
TABLET ORAL
Qty: 30 CAPSULE | Refills: 2 | Status: SHIPPED | OUTPATIENT
Start: 2019-08-05 | End: 2020-01-13

## 2019-08-07 ENCOUNTER — HOSPITAL ENCOUNTER (OUTPATIENT)
Dept: NEUROLOGY | Facility: HOSPITAL | Age: 59
Discharge: HOME | End: 2019-08-07
Admitting: INTERNAL MEDICINE

## 2019-08-07 PROBLEM — G56.20 ULNAR NEUROPATHY AT ELBOW: Status: ACTIVE | Noted: 2019-08-07

## 2019-08-07 PROCEDURE — 95886 MUSC TEST DONE W/N TEST COMP: CPT

## 2019-08-07 PROCEDURE — 95910 NRV CNDJ TEST 7-8 STUDIES: CPT

## 2019-08-26 ENCOUNTER — HOSPITAL ENCOUNTER (OUTPATIENT)
Dept: GENERAL RADIOLOGY | Facility: HOSPITAL | Age: 59
Discharge: HOME | End: 2019-08-26
Admitting: INTERNAL MEDICINE

## 2019-08-26 ENCOUNTER — OFFICE VISIT (OUTPATIENT)
Dept: INTERNAL MEDICINE | Facility: CLINIC | Age: 59
End: 2019-08-26

## 2019-08-26 VITALS
HEIGHT: 59 IN | SYSTOLIC BLOOD PRESSURE: 120 MMHG | DIASTOLIC BLOOD PRESSURE: 76 MMHG | WEIGHT: 108.8 LBS | HEART RATE: 68 BPM | BODY MASS INDEX: 21.93 KG/M2

## 2019-08-26 DIAGNOSIS — M25.551 RIGHT HIP PAIN: ICD-10-CM

## 2019-08-26 DIAGNOSIS — K21.00 GASTROESOPHAGEAL REFLUX DISEASE WITH ESOPHAGITIS: ICD-10-CM

## 2019-08-26 DIAGNOSIS — K29.30 CHRONIC SUPERFICIAL GASTRITIS WITHOUT BLEEDING: ICD-10-CM

## 2019-08-26 DIAGNOSIS — M81.0 OSTEOPOROSIS, UNSPECIFIED OSTEOPOROSIS TYPE, UNSPECIFIED PATHOLOGICAL FRACTURE PRESENCE: ICD-10-CM

## 2019-08-26 DIAGNOSIS — J30.9 ALLERGIC RHINITIS, UNSPECIFIED SEASONALITY, UNSPECIFIED TRIGGER: ICD-10-CM

## 2019-08-26 DIAGNOSIS — R73.09 ABNORMAL GLUCOSE: ICD-10-CM

## 2019-08-26 DIAGNOSIS — R12 HEARTBURN: ICD-10-CM

## 2019-08-26 DIAGNOSIS — E78.2 MIXED HYPERLIPIDEMIA: Primary | ICD-10-CM

## 2019-08-26 DIAGNOSIS — M47.812 OSTEOARTHRITIS OF CERVICAL SPINE, UNSPECIFIED SPINAL OSTEOARTHRITIS COMPLICATION STATUS: ICD-10-CM

## 2019-08-26 DIAGNOSIS — M25.552 LEFT HIP PAIN: ICD-10-CM

## 2019-08-26 PROBLEM — R07.9 CHEST PAIN: Status: RESOLVED | Noted: 2019-01-30 | Resolved: 2019-08-26

## 2019-08-26 PROCEDURE — 99214 OFFICE O/P EST MOD 30 MIN: CPT | Performed by: INTERNAL MEDICINE

## 2019-08-26 PROCEDURE — 73502 X-RAY EXAM HIP UNI 2-3 VIEWS: CPT

## 2019-08-26 RX ORDER — CELECOXIB 200 MG/1
200 CAPSULE ORAL DAILY PRN
Qty: 30 CAPSULE | Refills: 2 | Status: SHIPPED | OUTPATIENT
Start: 2019-08-26 | End: 2019-11-18 | Stop reason: SDUPTHER

## 2019-08-26 NOTE — PROGRESS NOTES
"Patient is a 59 y.o. female who is here for having hip pain.  Chief Complaint   Patient presents with   • Hip Pain         HPI:    Here for f/u.  Patient c/o several month hx of right hip pain.  Some improvement in the past with PT.  Sitting and standing worsens.  Difficulty going up stairs.  No recent trauma.  No radiation to knees.  Using tylenol.    GERD is controlled.  Sleeping well.  Depression is controlled with wellbutrin.      History:     Patient Active Problem List   Diagnosis   • Allergic rhinitis   • Anxiety state   • Hyperlipidemia   • Osteoporosis   • Gastroesophageal reflux disease with esophagitis   • Other insomnia   • Depressed   • Nausea   • Dysphagia   • Heartburn   • Periumbilical abdominal pain   • Osteoarthritis   • Chronic superficial gastritis without bleeding   • Ganglion cyst of wrist, left   • Esophageal stricture   • Gastric ulcer without hemorrhage or perforation   • Inlet patch of esophagus   • Helicobacter pylori infection   • Other chest pain   • Paresthesia of both hands   • Ulnar neuropathy at elbow   • Right hip pain       Past Medical History:   Diagnosis Date   • Anemia    • Arthritis    • Body piercing     EARS   • Depression    • Dysphagia     REPORTS SOMETIMES SHE HAS PAIN WHEN SWALLOWING FOOD   • Elevated cholesterol    • Elevated LFTs    • Epigastric pain    • Exposure to TB     REPORTS MANY YEARS AGO. REPORTS TB TESTING HAS ALWAYS BEEN NEGATIVE.    • GERD (gastroesophageal reflux disease)    • History of bronchitis 01/2019   • History of chest pain     REPORTS FALL 2018 AND THAT SHE HAD TESTING THAT WAS WNL'S. REPORTS SHE IS NOW BEING CHECKED FOR GI.    • History of exercise stress test     2018 - REPORTS WAS TOLD ALL WNL'S    • History of fracture     TOE   • Hyperlipidemia    • Latex allergy    • Murmur     REPORTS \"A SLIGHT MURMUR\"   • Osteoporosis    • Plantar fasciitis    • Seasonal allergies    • Vertigo    • Wears glasses     PRN       Past Surgical History: "   Procedure Laterality Date   • ABDOMINOPLASTY  2003   • AUGMENTATION MAMMAPLASTY Bilateral    • COLONOSCOPY  02/02/2016   • ENDOSCOPY N/A 2/12/2019    Procedure: ESOPHAGOGASTRODUODENOSCOPY with cold biopsy and esophageal dilation;  Surgeon: Brenden Steward MD;  Location: [facility] ENDOSCOPY;  Service: Gastroenterology   • ENDOSCOPY N/A 6/11/2019    Procedure: ESOPHAGOGASTRODUODENOSCOPY W/ COLD FORCEP BIOPSIES;  Surgeon: Brenden Steward MD;  Location: [facility] ENDOSCOPY;  Service: Gastroenterology   • UPPER GASTROINTESTINAL ENDOSCOPY  02/12/2019   • WISDOM TOOTH EXTRACTION         Current Outpatient Medications on File Prior to Visit   Medication Sig   • atorvastatin (LIPITOR) 20 MG tablet Take 1 tablet by mouth Every Night.   • buPROPion XL (WELLBUTRIN XL) 150 MG 24 hr tablet take 1 tablet by mouth every morning   • diclofenac (VOLTAREN) 1 % gel gel Apply 4 g topically to the appropriate area as directed 4 (Four) Times a Day.   • fexofenadine (ALLEGRA ALLERGY) 180 MG tablet Take 1 tablet by mouth Daily.   • Flax oil Take  by mouth.   • fluticasone (FLONASE) 50 MCG/ACT nasal spray 2 sprays into each nostril once daily   • Magnesium 250 MG tablet Take 500 mg by mouth Daily.   • Multiple Vitamins-Minerals (MULTIVITAMIN WITH MINERALS) tablet tablet Take 1 tablet by mouth Daily.   • ondansetron ODT (ZOFRAN-ODT) 4 MG disintegrating tablet Take 1 tablet by mouth Every 8 (Eight) Hours As Needed for Nausea or Vomiting.   • pantoprazole (PROTONIX) 40 MG EC tablet Take 1 tablet by mouth 30 minutes before breakfast daily.   • RA VITAMIN D-3 2000 units capsule take 1 capsule by mouth once daily   • traZODone (DESYREL) 50 MG tablet take 1 tablet by mouth at bedtime if needed for sleep   • FLUoxetine (PROzac) 20 MG capsule Take 2 capsules by mouth Daily.   • [DISCONTINUED] metoclopramide (REGLAN) 5 MG tablet Take 1/2 tablet by mouth 2 (Two) Times a Day Before Meals.     No current facility-administered medications on file prior to  visit.        Family History   Problem Relation Age of Onset   • Breast cancer Maternal Aunt    • Lung cancer Mother    • Heart attack Father    • Crohn's disease Sister    • Stomach cancer Paternal Grandfather    • Alcohol abuse Paternal Grandfather    • Colon cancer Neg Hx    • Cirrhosis Neg Hx    • Liver disease Neg Hx    • Liver cancer Neg Hx    • Ulcerative colitis Neg Hx    • Esophageal cancer Neg Hx        Social History     Socioeconomic History   • Marital status: Single     Spouse name: Not on file   • Number of children: Not on file   • Years of education: Not on file   • Highest education level: Not on file   Tobacco Use   • Smoking status: Former Smoker     Packs/day: 0.25     Years: 5.00     Pack years: 1.25     Types: Cigarettes     Last attempt to quit: 2019     Years since quittin.5   • Smokeless tobacco: Never Used   Substance and Sexual Activity   • Alcohol use: Yes     Comment: SOCIAL USE, NO HISTORY OF ABUSE REPORTED   • Drug use: No   • Sexual activity: Defer         Review of Systems   Constitutional: Negative for chills, fatigue and fever.   HENT: Negative for congestion, ear pain, hearing loss, rhinorrhea, sinus pressure, sore throat and trouble swallowing.    Eyes: Negative for discharge and itching.   Respiratory: Negative for cough, chest tightness and shortness of breath.    Cardiovascular: Negative for chest pain, palpitations and leg swelling.        Summer 2018 cardiac w/u neg   Gastrointestinal: Negative for abdominal pain, blood in stool, constipation, diarrhea and vomiting.         colonoscopy normal   Endocrine: Negative for polydipsia and polyuria.   Genitourinary: Negative for difficulty urinating, dysuria, enuresis, frequency, hematuria and urgency.         mammogram   Musculoskeletal: Positive for arthralgias and gait problem. Negative for back pain and joint swelling.        See HPI   Skin: Negative for rash and wound.   Allergic/Immunologic: Negative for  "immunocompromised state.   Neurological: Positive for numbness. Negative for dizziness, syncope, weakness, light-headedness and headaches.   Hematological: Does not bruise/bleed easily.   Psychiatric/Behavioral: Positive for behavioral problems, dysphoric mood and sleep disturbance. The patient is not nervous/anxious.        /76   Pulse 68   Ht 149.9 cm (59.02\")   Wt 49.4 kg (108 lb 12.8 oz)   LMP  (LMP Unknown)   BMI 21.96 kg/m²       Physical Exam   Constitutional: She is oriented to person, place, and time. She appears well-developed and well-nourished.   HENT:   Head: Normocephalic and atraumatic.   Right Ear: External ear normal.   Left Ear: External ear normal.   Mouth/Throat: Oropharynx is clear and moist.   Eyes: Conjunctivae and EOM are normal.   Neck: Normal range of motion. Neck supple.   Cardiovascular: Normal rate, regular rhythm and normal heart sounds.   Pulmonary/Chest: Effort normal and breath sounds normal.   Abdominal: Soft. Bowel sounds are normal.   Musculoskeletal: Normal range of motion.   FROM on left hip   Lymphadenopathy:     She has no cervical adenopathy.   Neurological: She is alert and oriented to person, place, and time.   Skin: Skin is warm and dry.   Psychiatric: She has a normal mood and affect. Her behavior is normal. Thought content normal.       Procedure:      Discussion/Summary:    GERD-cont ppi, stable  Osteoporosis-cont fosamax, stable  Hyperlipidemia-labs noted, counseled on diet  Insomnia-cont trazodone, stable  Depression-cont Wellbutrin  Elevated LFTs-recheck noted  Abnormal glucose-counseled on diet, labs today  Right hip pain-check xray and Celebrex pain     Reviewed records available  Labs noted and dw patient    Current Outpatient Medications:   •  atorvastatin (LIPITOR) 20 MG tablet, Take 1 tablet by mouth Every Night., Disp: 30 tablet, Rfl: 11  •  buPROPion XL (WELLBUTRIN XL) 150 MG 24 hr tablet, take 1 tablet by mouth every morning, Disp: 30 tablet, Rfl: " 5  •  diclofenac (VOLTAREN) 1 % gel gel, Apply 4 g topically to the appropriate area as directed 4 (Four) Times a Day., Disp: 100 g, Rfl: 2  •  fexofenadine (ALLEGRA ALLERGY) 180 MG tablet, Take 1 tablet by mouth Daily., Disp: 30 tablet, Rfl: 5  •  Flax oil, Take  by mouth., Disp: , Rfl:   •  fluticasone (FLONASE) 50 MCG/ACT nasal spray, 2 sprays into each nostril once daily, Disp: 15.8 mL, Rfl: 1  •  Magnesium 250 MG tablet, Take 500 mg by mouth Daily., Disp: , Rfl:   •  Multiple Vitamins-Minerals (MULTIVITAMIN WITH MINERALS) tablet tablet, Take 1 tablet by mouth Daily., Disp: , Rfl:   •  ondansetron ODT (ZOFRAN-ODT) 4 MG disintegrating tablet, Take 1 tablet by mouth Every 8 (Eight) Hours As Needed for Nausea or Vomiting., Disp: 30 tablet, Rfl: 1  •  pantoprazole (PROTONIX) 40 MG EC tablet, Take 1 tablet by mouth 30 minutes before breakfast daily., Disp: 30 tablet, Rfl: 5  •  RA VITAMIN D-3 2000 units capsule, take 1 capsule by mouth once daily, Disp: 30 capsule, Rfl: 2  •  traZODone (DESYREL) 50 MG tablet, take 1 tablet by mouth at bedtime if needed for sleep, Disp: 30 tablet, Rfl: 5  •  celecoxib (CELEBREX) 200 MG capsule, Take 1 capsule by mouth Daily As Needed for Moderate Pain ., Disp: 30 capsule, Rfl: 2  •  FLUoxetine (PROzac) 20 MG capsule, Take 2 capsules by mouth Daily., Disp: 60 capsule, Rfl: 11        Areli was seen today for hip pain.    Diagnoses and all orders for this visit:    Mixed hyperlipidemia    Allergic rhinitis, unspecified seasonality, unspecified trigger    Heartburn    Gastroesophageal reflux disease with esophagitis    Chronic superficial gastritis without bleeding    Osteoporosis, unspecified osteoporosis type, unspecified pathological fracture presence    Osteoarthritis of cervical spine, unspecified spinal osteoarthritis complication status  -     celecoxib (CELEBREX) 200 MG capsule; Take 1 capsule by mouth Daily As Needed for Moderate Pain .    Left hip pain  -     Cancel: XR Hip With  or Without Pelvis 2 - 3 View Left  -     celecoxib (CELEBREX) 200 MG capsule; Take 1 capsule by mouth Daily As Needed for Moderate Pain .    Right hip pain  -     XR Hip With or Without Pelvis 2 - 3 View Right    Abnormal glucose  -     Hemoglobin A1c  -     Basic Metabolic Panel

## 2019-08-27 LAB
BUN SERPL-MCNC: 13 MG/DL (ref 6–20)
BUN/CREAT SERPL: 16.3 (ref 7–25)
CALCIUM SERPL-MCNC: 9.9 MG/DL (ref 8.6–10.5)
CHLORIDE SERPL-SCNC: 97 MMOL/L (ref 98–107)
CO2 SERPL-SCNC: 27.5 MMOL/L (ref 22–29)
CREAT SERPL-MCNC: 0.8 MG/DL (ref 0.57–1)
GLUCOSE SERPL-MCNC: 93 MG/DL (ref 65–99)
HBA1C MFR BLD: 5.2 % (ref 4.8–5.6)
POTASSIUM SERPL-SCNC: 4.8 MMOL/L (ref 3.5–5.2)
SODIUM SERPL-SCNC: 139 MMOL/L (ref 136–145)

## 2019-09-03 RX ORDER — FLUTICASONE PROPIONATE 50 MCG
SPRAY, SUSPENSION (ML) NASAL
Qty: 15.8 ML | Refills: 1 | Status: SHIPPED | OUTPATIENT
Start: 2019-09-03 | End: 2019-11-18 | Stop reason: SDUPTHER

## 2019-09-20 DIAGNOSIS — J30.1 SEASONAL ALLERGIC RHINITIS DUE TO POLLEN: ICD-10-CM

## 2019-09-20 RX ORDER — FEXOFENADINE HCL 180 MG
TABLET ORAL
Qty: 30 TABLET | Refills: 5 | Status: SHIPPED | OUTPATIENT
Start: 2019-09-20 | End: 2020-05-20

## 2019-10-01 DIAGNOSIS — G47.09 OTHER INSOMNIA: ICD-10-CM

## 2019-10-01 RX ORDER — TRAZODONE HYDROCHLORIDE 50 MG/1
TABLET ORAL
Qty: 30 TABLET | Refills: 5 | Status: SHIPPED | OUTPATIENT
Start: 2019-10-01 | End: 2020-11-03 | Stop reason: SDUPTHER

## 2019-10-24 ENCOUNTER — TELEPHONE (OUTPATIENT)
Dept: INTERNAL MEDICINE | Facility: CLINIC | Age: 59
End: 2019-10-24

## 2019-10-24 RX ORDER — CYCLOBENZAPRINE HCL 10 MG
10 TABLET ORAL 3 TIMES DAILY PRN
Qty: 30 TABLET | Refills: 0 | Status: SHIPPED | OUTPATIENT
Start: 2019-10-24 | End: 2021-01-26 | Stop reason: SDUPTHER

## 2019-10-24 NOTE — TELEPHONE ENCOUNTER
PT HURT HER BACK MOVING A PATIENT AND WANTS TO KNOW IF DR PRADHAN WILL SEND IN A RX FOR HER MUSCLE RELAXER.

## 2019-11-13 ENCOUNTER — OFFICE VISIT (OUTPATIENT)
Dept: INTERNAL MEDICINE | Facility: CLINIC | Age: 59
End: 2019-11-13

## 2019-11-13 VITALS
HEIGHT: 59 IN | WEIGHT: 108 LBS | HEART RATE: 72 BPM | SYSTOLIC BLOOD PRESSURE: 110 MMHG | DIASTOLIC BLOOD PRESSURE: 70 MMHG | BODY MASS INDEX: 21.77 KG/M2

## 2019-11-13 DIAGNOSIS — K25.9 GASTRIC ULCER WITHOUT HEMORRHAGE OR PERFORATION, UNSPECIFIED CHRONICITY: ICD-10-CM

## 2019-11-13 DIAGNOSIS — F32.1 CURRENT MODERATE EPISODE OF MAJOR DEPRESSIVE DISORDER, UNSPECIFIED WHETHER RECURRENT (HCC): ICD-10-CM

## 2019-11-13 DIAGNOSIS — M81.0 OSTEOPOROSIS, UNSPECIFIED OSTEOPOROSIS TYPE, UNSPECIFIED PATHOLOGICAL FRACTURE PRESENCE: ICD-10-CM

## 2019-11-13 DIAGNOSIS — K21.00 GASTROESOPHAGEAL REFLUX DISEASE WITH ESOPHAGITIS: ICD-10-CM

## 2019-11-13 DIAGNOSIS — E78.2 MIXED HYPERLIPIDEMIA: Primary | ICD-10-CM

## 2019-11-13 DIAGNOSIS — J30.9 ALLERGIC RHINITIS, UNSPECIFIED SEASONALITY, UNSPECIFIED TRIGGER: ICD-10-CM

## 2019-11-13 DIAGNOSIS — M47.812 OSTEOARTHRITIS OF CERVICAL SPINE, UNSPECIFIED SPINAL OSTEOARTHRITIS COMPLICATION STATUS: ICD-10-CM

## 2019-11-13 DIAGNOSIS — R12 HEARTBURN: ICD-10-CM

## 2019-11-13 PROCEDURE — 99214 OFFICE O/P EST MOD 30 MIN: CPT | Performed by: INTERNAL MEDICINE

## 2019-11-13 RX ORDER — ALENDRONATE SODIUM 70 MG/1
70 TABLET ORAL
Qty: 12 TABLET | Refills: 3 | Status: SHIPPED | OUTPATIENT
Start: 2019-11-13 | End: 2020-11-23

## 2019-11-13 NOTE — PROGRESS NOTES
"Patient is a 59 y.o. female who is here for a follow up of hyperlipidemia.  Chief Complaint   Patient presents with   • Hyperlipidemia         HPI:    Here for mgmt of hyperlipidemia and GERD.  Onset years.  Appetite is good.  No dizziness or lightheadedness.  Some SOB.  No cough.  No PND or orthopnea.  Depression is controlled and wanting to go off welbutrin.  Allergies controlled.    History:     Patient Active Problem List   Diagnosis   • Allergic rhinitis   • Anxiety state   • Hyperlipidemia   • Osteoporosis   • Gastroesophageal reflux disease with esophagitis   • Other insomnia   • Depressed   • Nausea   • Dysphagia   • Heartburn   • Periumbilical abdominal pain   • Osteoarthritis   • Chronic superficial gastritis without bleeding   • Ganglion cyst of wrist, left   • Esophageal stricture   • Gastric ulcer without hemorrhage or perforation   • Inlet patch of esophagus   • Helicobacter pylori infection   • Other chest pain   • Paresthesia of both hands   • Ulnar neuropathy at elbow   • Right hip pain       Past Medical History:   Diagnosis Date   • Anemia    • Arthritis    • Body piercing     EARS   • Depression    • Dysphagia     REPORTS SOMETIMES SHE HAS PAIN WHEN SWALLOWING FOOD   • Elevated cholesterol    • Elevated LFTs    • Epigastric pain    • Exposure to TB     REPORTS MANY YEARS AGO. REPORTS TB TESTING HAS ALWAYS BEEN NEGATIVE.    • GERD (gastroesophageal reflux disease)    • History of bronchitis 01/2019   • History of chest pain     REPORTS FALL 2018 AND THAT SHE HAD TESTING THAT WAS WNL'S. REPORTS SHE IS NOW BEING CHECKED FOR GI.    • History of exercise stress test     2018 - REPORTS WAS TOLD ALL WNL'S    • History of fracture     TOE   • Hyperlipidemia    • Latex allergy    • Murmur     REPORTS \"A SLIGHT MURMUR\"   • Osteoporosis    • Plantar fasciitis    • Seasonal allergies    • Vertigo    • Wears glasses     PRN       Past Surgical History:   Procedure Laterality Date   • ABDOMINOPLASTY  2003   • " AUGMENTATION MAMMAPLASTY Bilateral    • COLONOSCOPY  02/02/2016   • ENDOSCOPY N/A 2/12/2019    Procedure: ESOPHAGOGASTRODUODENOSCOPY with cold biopsy and esophageal dilation;  Surgeon: Brenden Steward MD;  Location: [facility] ENDOSCOPY;  Service: Gastroenterology   • ENDOSCOPY N/A 6/11/2019    Procedure: ESOPHAGOGASTRODUODENOSCOPY W/ COLD FORCEP BIOPSIES;  Surgeon: Brenden Steward MD;  Location: [facility] ENDOSCOPY;  Service: Gastroenterology   • UPPER GASTROINTESTINAL ENDOSCOPY  02/12/2019   • WISDOM TOOTH EXTRACTION         Current Outpatient Medications on File Prior to Visit   Medication Sig   • atorvastatin (LIPITOR) 20 MG tablet Take 1 tablet by mouth Every Night.   • buPROPion XL (WELLBUTRIN XL) 150 MG 24 hr tablet take 1 tablet by mouth every morning   • celecoxib (CELEBREX) 200 MG capsule Take 1 capsule by mouth Daily As Needed for Moderate Pain .   • clotrimazole-betamethasone (LOTRISONE) 1-0.05 % cream Apply  topically to the appropriate area as directed 2 (Two) Times a Day.   • cyclobenzaprine (FLEXERIL) 10 MG tablet Take 1 tablet by mouth 3 (Three) Times a Day As Needed for Muscle Spasms.   • diclofenac (VOLTAREN) 1 % gel gel Apply 4 g topically to the appropriate area as directed 4 (Four) Times a Day.   • Flax oil Take  by mouth.   • FLUoxetine (PROzac) 20 MG capsule Take 2 capsules by mouth Daily.   • fluticasone (FLONASE) 50 MCG/ACT nasal spray instill 2 sprays into each nostril once daily   • Magnesium 250 MG tablet Take 500 mg by mouth Daily.   • Multiple Vitamins-Minerals (MULTIVITAMIN WITH MINERALS) tablet tablet Take 1 tablet by mouth Daily.   • ondansetron ODT (ZOFRAN-ODT) 4 MG disintegrating tablet Take 1 tablet by mouth Every 8 (Eight) Hours As Needed for Nausea or Vomiting.   • pantoprazole (PROTONIX) 40 MG EC tablet Take 1 tablet by mouth 30 minutes before breakfast daily.   • RA ALLERGY RELIEF 180 MG tablet take 1 tablet by mouth once daily   • RA VITAMIN D-3 2000 units capsule take 1 capsule by  mouth once daily   • traZODone (DESYREL) 50 MG tablet take 1 tablet by mouth at bedtime if needed for sleep     No current facility-administered medications on file prior to visit.        Family History   Problem Relation Age of Onset   • Breast cancer Maternal Aunt    • Lung cancer Mother    • Heart attack Father    • Crohn's disease Sister    • Stomach cancer Paternal Grandfather    • Alcohol abuse Paternal Grandfather    • Colon cancer Neg Hx    • Cirrhosis Neg Hx    • Liver disease Neg Hx    • Liver cancer Neg Hx    • Ulcerative colitis Neg Hx    • Esophageal cancer Neg Hx        Social History     Socioeconomic History   • Marital status: Single     Spouse name: Not on file   • Number of children: Not on file   • Years of education: Not on file   • Highest education level: Not on file   Tobacco Use   • Smoking status: Former Smoker     Packs/day: 0.25     Years: 5.00     Pack years: 1.25     Types: Cigarettes     Last attempt to quit: 2019     Years since quittin.7   • Smokeless tobacco: Never Used   Substance and Sexual Activity   • Alcohol use: Yes     Comment: SOCIAL USE, NO HISTORY OF ABUSE REPORTED   • Drug use: No   • Sexual activity: Defer         Review of Systems   Constitutional: Negative for chills, fatigue and fever.   HENT: Negative for congestion, ear pain, hearing loss, rhinorrhea, sinus pressure, sore throat and trouble swallowing.    Eyes: Negative for discharge and itching.   Respiratory: Negative for cough, chest tightness and shortness of breath.    Cardiovascular: Negative for chest pain, palpitations and leg swelling.        Summer 2018 cardiac w/u neg   Gastrointestinal: Negative for abdominal pain, blood in stool, constipation, diarrhea and vomiting.         colonoscopy normal   Endocrine: Negative for polydipsia and polyuria.   Genitourinary: Negative for difficulty urinating, dysuria, enuresis, frequency, hematuria and urgency.         mammogram   Musculoskeletal:  "Positive for arthralgias. Negative for back pain and joint swelling.        See HPI   Skin: Negative for rash and wound.   Allergic/Immunologic: Negative for immunocompromised state.   Neurological: Positive for numbness. Negative for dizziness, syncope, weakness, light-headedness and headaches.   Hematological: Does not bruise/bleed easily.   Psychiatric/Behavioral: The patient is not nervous/anxious.        /70   Pulse 72   Ht 149.9 cm (59.02\")   Wt 49 kg (108 lb)   LMP  (LMP Unknown)   BMI 21.80 kg/m²       Physical Exam   Constitutional: She is oriented to person, place, and time. She appears well-developed and well-nourished.   HENT:   Head: Normocephalic and atraumatic.   Right Ear: External ear normal.   Left Ear: External ear normal.   Mouth/Throat: Oropharynx is clear and moist.   Eyes: Conjunctivae and EOM are normal.   Neck: Normal range of motion. Neck supple.   Cardiovascular: Normal rate, regular rhythm and normal heart sounds.   Pulmonary/Chest: Effort normal and breath sounds normal.   Abdominal: Soft. Bowel sounds are normal.   Musculoskeletal: Normal range of motion.   Lymphadenopathy:     She has no cervical adenopathy.   Neurological: She is alert and oriented to person, place, and time.   Skin: Skin is warm and dry.   Psychiatric: She has a normal mood and affect. Her behavior is normal. Thought content normal.       Procedure:      Discussion/Summary:    GERD-cont ppi, stable  Osteoporosis-restart fosamax, stable  Hyperlipidemia-labs noted from 6/10 at goal, counseled on diet  Insomnia-cont trazodone, stable  Depression-cont prozac, wean off welbutrin  Elevated LFTs-recheck noted and at goal  Abnormal glucose-counseled on diet, labs 8/26 at goal     8/26 Labs noted and dw patient, has had the flu shot    Current Outpatient Medications:   •  atorvastatin (LIPITOR) 20 MG tablet, Take 1 tablet by mouth Every Night., Disp: 30 tablet, Rfl: 11  •  buPROPion XL (WELLBUTRIN XL) 150 MG 24 hr " tablet, take 1 tablet by mouth every morning, Disp: 30 tablet, Rfl: 5  •  celecoxib (CELEBREX) 200 MG capsule, Take 1 capsule by mouth Daily As Needed for Moderate Pain ., Disp: 30 capsule, Rfl: 2  •  clotrimazole-betamethasone (LOTRISONE) 1-0.05 % cream, Apply  topically to the appropriate area as directed 2 (Two) Times a Day., Disp: 15 g, Rfl: 0  •  cyclobenzaprine (FLEXERIL) 10 MG tablet, Take 1 tablet by mouth 3 (Three) Times a Day As Needed for Muscle Spasms., Disp: 30 tablet, Rfl: 0  •  diclofenac (VOLTAREN) 1 % gel gel, Apply 4 g topically to the appropriate area as directed 4 (Four) Times a Day., Disp: 100 g, Rfl: 2  •  Flax oil, Take  by mouth., Disp: , Rfl:   •  FLUoxetine (PROzac) 20 MG capsule, Take 2 capsules by mouth Daily., Disp: 60 capsule, Rfl: 11  •  fluticasone (FLONASE) 50 MCG/ACT nasal spray, instill 2 sprays into each nostril once daily, Disp: 15.8 mL, Rfl: 1  •  Magnesium 250 MG tablet, Take 500 mg by mouth Daily., Disp: , Rfl:   •  Multiple Vitamins-Minerals (MULTIVITAMIN WITH MINERALS) tablet tablet, Take 1 tablet by mouth Daily., Disp: , Rfl:   •  ondansetron ODT (ZOFRAN-ODT) 4 MG disintegrating tablet, Take 1 tablet by mouth Every 8 (Eight) Hours As Needed for Nausea or Vomiting., Disp: 30 tablet, Rfl: 1  •  pantoprazole (PROTONIX) 40 MG EC tablet, Take 1 tablet by mouth 30 minutes before breakfast daily., Disp: 30 tablet, Rfl: 5  •  RA ALLERGY RELIEF 180 MG tablet, take 1 tablet by mouth once daily, Disp: 30 tablet, Rfl: 5  •  RA VITAMIN D-3 2000 units capsule, take 1 capsule by mouth once daily, Disp: 30 capsule, Rfl: 2  •  traZODone (DESYREL) 50 MG tablet, take 1 tablet by mouth at bedtime if needed for sleep, Disp: 30 tablet, Rfl: 5  •  alendronate (FOSAMAX) 70 MG tablet, Take 1 tablet by mouth Every 7 (Seven) Days., Disp: 12 tablet, Rfl: 3        Areli was seen today for hyperlipidemia.    Diagnoses and all orders for this visit:    Mixed hyperlipidemia    Allergic rhinitis,  unspecified seasonality, unspecified trigger    Heartburn    Gastroesophageal reflux disease with esophagitis    Gastric ulcer without hemorrhage or perforation, unspecified chronicity    Osteoporosis, unspecified osteoporosis type, unspecified pathological fracture presence  -     alendronate (FOSAMAX) 70 MG tablet; Take 1 tablet by mouth Every 7 (Seven) Days.    Osteoarthritis of cervical spine, unspecified spinal osteoarthritis complication status    Current moderate episode of major depressive disorder, unspecified whether recurrent (CMS/[facility])

## 2019-11-18 DIAGNOSIS — M25.552 LEFT HIP PAIN: ICD-10-CM

## 2019-11-18 DIAGNOSIS — M47.812 OSTEOARTHRITIS OF CERVICAL SPINE, UNSPECIFIED SPINAL OSTEOARTHRITIS COMPLICATION STATUS: ICD-10-CM

## 2019-11-18 RX ORDER — FLUTICASONE PROPIONATE 50 MCG
SPRAY, SUSPENSION (ML) NASAL
Qty: 15.8 ML | Refills: 0 | Status: SHIPPED | OUTPATIENT
Start: 2019-11-18 | End: 2020-02-05

## 2019-11-18 RX ORDER — CELECOXIB 200 MG/1
200 CAPSULE ORAL DAILY PRN
Qty: 30 CAPSULE | Refills: 0 | Status: SHIPPED | OUTPATIENT
Start: 2019-11-18 | End: 2020-02-05

## 2019-12-30 ENCOUNTER — TELEPHONE (OUTPATIENT)
Dept: INTERNAL MEDICINE | Facility: CLINIC | Age: 59
End: 2019-12-30

## 2019-12-30 DIAGNOSIS — Z12.39 BREAST CANCER SCREENING: Primary | ICD-10-CM

## 2020-01-13 RX ORDER — ACETAMINOPHEN 160 MG
TABLET,DISINTEGRATING ORAL
Qty: 30 CAPSULE | Refills: 2 | Status: SHIPPED | OUTPATIENT
Start: 2020-01-13 | End: 2020-02-05

## 2020-02-04 DIAGNOSIS — F32.1 CURRENT MODERATE EPISODE OF MAJOR DEPRESSIVE DISORDER, UNSPECIFIED WHETHER RECURRENT (HCC): ICD-10-CM

## 2020-02-04 DIAGNOSIS — M25.552 LEFT HIP PAIN: ICD-10-CM

## 2020-02-04 DIAGNOSIS — E78.2 MIXED HYPERLIPIDEMIA: ICD-10-CM

## 2020-02-04 DIAGNOSIS — M47.812 OSTEOARTHRITIS OF CERVICAL SPINE, UNSPECIFIED SPINAL OSTEOARTHRITIS COMPLICATION STATUS: ICD-10-CM

## 2020-02-05 RX ORDER — FLUOXETINE HYDROCHLORIDE 20 MG/1
40 CAPSULE ORAL DAILY
Qty: 60 CAPSULE | Refills: 11 | Status: SHIPPED | OUTPATIENT
Start: 2020-02-05 | End: 2021-02-24

## 2020-02-05 RX ORDER — CELECOXIB 200 MG/1
200 CAPSULE ORAL DAILY PRN
Qty: 30 CAPSULE | Refills: 0 | Status: SHIPPED | OUTPATIENT
Start: 2020-02-05 | End: 2020-03-04

## 2020-02-05 RX ORDER — BUPROPION HYDROCHLORIDE 150 MG/1
TABLET ORAL
Qty: 30 TABLET | Refills: 5 | Status: SHIPPED | OUTPATIENT
Start: 2020-02-05 | End: 2021-01-25

## 2020-02-05 RX ORDER — FLUTICASONE PROPIONATE 50 MCG
SPRAY, SUSPENSION (ML) NASAL
Qty: 15.8 ML | Refills: 0 | Status: SHIPPED | OUTPATIENT
Start: 2020-02-05 | End: 2020-04-09

## 2020-02-05 RX ORDER — ATORVASTATIN CALCIUM 20 MG/1
TABLET, FILM COATED ORAL
Qty: 30 TABLET | Refills: 11 | Status: SHIPPED | OUTPATIENT
Start: 2020-02-05 | End: 2020-09-09 | Stop reason: SDUPTHER

## 2020-02-05 RX ORDER — ACETAMINOPHEN 160 MG
TABLET,DISINTEGRATING ORAL
Qty: 30 CAPSULE | Refills: 2 | Status: SHIPPED | OUTPATIENT
Start: 2020-02-05 | End: 2020-05-20

## 2020-02-17 ENCOUNTER — APPOINTMENT (OUTPATIENT)
Dept: MAMMOGRAPHY | Facility: HOSPITAL | Age: 60
End: 2020-02-17

## 2020-03-03 DIAGNOSIS — M47.812 OSTEOARTHRITIS OF CERVICAL SPINE, UNSPECIFIED SPINAL OSTEOARTHRITIS COMPLICATION STATUS: ICD-10-CM

## 2020-03-03 DIAGNOSIS — M25.552 LEFT HIP PAIN: ICD-10-CM

## 2020-03-04 RX ORDER — CELECOXIB 200 MG/1
200 CAPSULE ORAL DAILY PRN
Qty: 30 CAPSULE | Refills: 3 | Status: SHIPPED | OUTPATIENT
Start: 2020-03-04 | End: 2020-10-01

## 2020-03-16 ENCOUNTER — OFFICE VISIT (OUTPATIENT)
Dept: INTERNAL MEDICINE | Facility: CLINIC | Age: 60
End: 2020-03-16

## 2020-03-16 VITALS
DIASTOLIC BLOOD PRESSURE: 74 MMHG | HEART RATE: 64 BPM | SYSTOLIC BLOOD PRESSURE: 110 MMHG | BODY MASS INDEX: 21.97 KG/M2 | HEIGHT: 59 IN | WEIGHT: 109 LBS

## 2020-03-16 DIAGNOSIS — E78.2 MIXED HYPERLIPIDEMIA: Primary | ICD-10-CM

## 2020-03-16 DIAGNOSIS — M47.812 OSTEOARTHRITIS OF CERVICAL SPINE, UNSPECIFIED SPINAL OSTEOARTHRITIS COMPLICATION STATUS: ICD-10-CM

## 2020-03-16 DIAGNOSIS — M81.0 OSTEOPOROSIS, UNSPECIFIED OSTEOPOROSIS TYPE, UNSPECIFIED PATHOLOGICAL FRACTURE PRESENCE: ICD-10-CM

## 2020-03-16 DIAGNOSIS — K29.30 CHRONIC SUPERFICIAL GASTRITIS WITHOUT BLEEDING: ICD-10-CM

## 2020-03-16 DIAGNOSIS — J30.9 ALLERGIC RHINITIS, UNSPECIFIED SEASONALITY, UNSPECIFIED TRIGGER: ICD-10-CM

## 2020-03-16 DIAGNOSIS — K21.00 GASTROESOPHAGEAL REFLUX DISEASE WITH ESOPHAGITIS: ICD-10-CM

## 2020-03-16 PROCEDURE — 99214 OFFICE O/P EST MOD 30 MIN: CPT | Performed by: INTERNAL MEDICINE

## 2020-03-16 NOTE — PROGRESS NOTES
"Patient is a 60 y.o. female who is here for a follow up of hyperlipidemia.  Chief Complaint   Patient presents with   • Hyperlipidemia         HPI:    Here for mgmt of GERD and depression and hyperlipidemia.  Onset years.  GERD is controlled with cutting back on acidic foods.  Protonix compliant.  Depression is controlled with prozac.  No dizziness or lightheadedness.  Appetite is good.  No ankle edema.  Sleeping well.     History:     Patient Active Problem List   Diagnosis   • Allergic rhinitis   • Anxiety state   • Hyperlipidemia   • Osteoporosis   • Gastroesophageal reflux disease with esophagitis   • Other insomnia   • Depressed   • Nausea   • Dysphagia   • Heartburn   • Periumbilical abdominal pain   • Osteoarthritis   • Chronic superficial gastritis without bleeding   • Ganglion cyst of wrist, left   • Esophageal stricture   • Gastric ulcer without hemorrhage or perforation   • Inlet patch of esophagus   • Helicobacter pylori infection   • Other chest pain   • Paresthesia of both hands   • Ulnar neuropathy at elbow   • Right hip pain       Past Medical History:   Diagnosis Date   • Anemia    • Arthritis    • Body piercing     EARS   • Depression    • Dysphagia     REPORTS SOMETIMES SHE HAS PAIN WHEN SWALLOWING FOOD   • Elevated cholesterol    • Elevated LFTs    • Epigastric pain    • Exposure to TB     REPORTS MANY YEARS AGO. REPORTS TB TESTING HAS ALWAYS BEEN NEGATIVE.    • GERD (gastroesophageal reflux disease)    • History of bronchitis 01/2019   • History of chest pain     REPORTS FALL 2018 AND THAT SHE HAD TESTING THAT WAS WNL'S. REPORTS SHE IS NOW BEING CHECKED FOR GI.    • History of exercise stress test     2018 - REPORTS WAS TOLD ALL WNL'S    • History of fracture     TOE   • Hyperlipidemia    • Latex allergy    • Murmur     REPORTS \"A SLIGHT MURMUR\"   • Osteoporosis    • Plantar fasciitis    • Seasonal allergies    • Vertigo    • Wears glasses     PRN       Past Surgical History:   Procedure " Laterality Date   • ABDOMINOPLASTY  2003   • AUGMENTATION MAMMAPLASTY Bilateral    • COLONOSCOPY  02/02/2016   • ENDOSCOPY N/A 2/12/2019    Procedure: ESOPHAGOGASTRODUODENOSCOPY with cold biopsy and esophageal dilation;  Surgeon: Brenden Steward MD;  Location: [facility] ENDOSCOPY;  Service: Gastroenterology   • ENDOSCOPY N/A 6/11/2019    Procedure: ESOPHAGOGASTRODUODENOSCOPY W/ COLD FORCEP BIOPSIES;  Surgeon: Brenden Steward MD;  Location: [facility] ENDOSCOPY;  Service: Gastroenterology   • UPPER GASTROINTESTINAL ENDOSCOPY  02/12/2019   • WISDOM TOOTH EXTRACTION         Current Outpatient Medications on File Prior to Visit   Medication Sig   • alendronate (FOSAMAX) 70 MG tablet Take 1 tablet by mouth Every 7 (Seven) Days.   • atorvastatin (LIPITOR) 20 MG tablet TAKE 1 TABLET BY MOUTH DAILY AT BEDTIME   • celecoxib (CeleBREX) 200 MG capsule TAKE 1 CAPSULE BY MOUTH DAILY AS NEEDED FOR MODERATE PAIN   • Cholecalciferol (VITAMIN D3) 50 MCG (2000 UT) capsule TAKE 1 CAPSULE BY MOUTH ONCE DAILY   • clotrimazole-betamethasone (LOTRISONE) 1-0.05 % cream Apply  topically to the appropriate area as directed 2 (Two) Times a Day.   • cyclobenzaprine (FLEXERIL) 10 MG tablet Take 1 tablet by mouth 3 (Three) Times a Day As Needed for Muscle Spasms.   • diclofenac (VOLTAREN) 1 % gel gel Apply 4 g topically to the appropriate area as directed 4 (Four) Times a Day.   • Flax oil Take  by mouth.   • FLUoxetine (PROzac) 20 MG capsule TAKE 2 CAPSULES BY MOUTH DAILY   • fluticasone (FLONASE) 50 MCG/ACT nasal spray INSTILL 2 SPRAYS IN EACH NOSTRIL ONCE DAILY   • Magnesium 250 MG tablet Take 500 mg by mouth Daily.   • Multiple Vitamins-Minerals (MULTIVITAMIN WITH MINERALS) tablet tablet Take 1 tablet by mouth Daily.   • ondansetron ODT (ZOFRAN-ODT) 4 MG disintegrating tablet Take 1 tablet by mouth Every 8 (Eight) Hours As Needed for Nausea or Vomiting.   • pantoprazole (PROTONIX) 40 MG EC tablet Take 1 tablet by mouth 30 minutes before breakfast  daily.   • RA ALLERGY RELIEF 180 MG tablet take 1 tablet by mouth once daily   • traZODone (DESYREL) 50 MG tablet take 1 tablet by mouth at bedtime if needed for sleep   • buPROPion XL (WELLBUTRIN XL) 150 MG 24 hr tablet TAKE 1 TABLET BY MOUTH DAILY EVERY MORNING     No current facility-administered medications on file prior to visit.        Family History   Problem Relation Age of Onset   • Breast cancer Maternal Aunt    • Lung cancer Mother    • Heart attack Father    • Crohn's disease Sister    • Stomach cancer Paternal Grandfather    • Alcohol abuse Paternal Grandfather    • Colon cancer Neg Hx    • Cirrhosis Neg Hx    • Liver disease Neg Hx    • Liver cancer Neg Hx    • Ulcerative colitis Neg Hx    • Esophageal cancer Neg Hx        Social History     Socioeconomic History   • Marital status: Single     Spouse name: Not on file   • Number of children: Not on file   • Years of education: Not on file   • Highest education level: Not on file   Tobacco Use   • Smoking status: Former Smoker     Packs/day: 0.25     Years: 5.00     Pack years: 1.25     Types: Cigarettes     Last attempt to quit: 2019     Years since quittin.1   • Smokeless tobacco: Never Used   Substance and Sexual Activity   • Alcohol use: Yes     Comment: SOCIAL USE, NO HISTORY OF ABUSE REPORTED   • Drug use: No   • Sexual activity: Defer         Review of Systems   Constitutional: Negative for chills, fatigue and fever.   HENT: Negative for congestion, ear pain, hearing loss, rhinorrhea, sinus pressure, sore throat and trouble swallowing.    Eyes: Negative for discharge and itching.   Respiratory: Negative for cough, chest tightness and shortness of breath.    Cardiovascular: Negative for chest pain, palpitations and leg swelling.        Summer 2018 cardiac w/u neg   Gastrointestinal: Negative for abdominal pain, blood in stool, constipation, diarrhea and vomiting.         colonoscopy normal   colonoscopy normal   Endocrine: Negative  "for polydipsia and polyuria.   Genitourinary: Negative for difficulty urinating, dysuria, enuresis, frequency, hematuria and urgency.        3/30 mammogram pending   Musculoskeletal: Positive for arthralgias. Negative for back pain and joint swelling.        See HPI   Skin: Negative for rash and wound.   Allergic/Immunologic: Negative for immunocompromised state.   Neurological: Negative for dizziness, syncope, weakness, light-headedness and headaches.   Hematological: Does not bruise/bleed easily.   Psychiatric/Behavioral: The patient is not nervous/anxious.        /74   Pulse 64   Ht 149.9 cm (59.02\")   Wt 49.4 kg (109 lb)   LMP  (LMP Unknown)   BMI 22.00 kg/m²       Physical Exam   Constitutional: She is oriented to person, place, and time. She appears well-developed and well-nourished.   HENT:   Head: Normocephalic and atraumatic.   Right Ear: External ear normal.   Left Ear: External ear normal.   Mouth/Throat: Oropharynx is clear and moist.   Eyes: Conjunctivae and EOM are normal.   Neck: Normal range of motion. Neck supple.   Cardiovascular: Normal rate, regular rhythm and normal heart sounds.   Pulmonary/Chest: Effort normal and breath sounds normal.   Abdominal: Soft. Bowel sounds are normal.   Musculoskeletal: Normal range of motion.   Lymphadenopathy:     She has no cervical adenopathy.   Neurological: She is alert and oriented to person, place, and time.   Skin: Skin is warm and dry.   Psychiatric: She has a normal mood and affect. Her behavior is normal. Thought content normal.       Procedure:      Discussion/Summary:    GERD-cont protonix, stable  Osteoporosis-cont weekly fosamax, stable  Hyperlipidemia-labs today at goal, counseled on diet  Insomnia-cont trazodone, stable  Depression-cont prozac     3/16 Labs noted and dw patient    Current Outpatient Medications:   •  alendronate (FOSAMAX) 70 MG tablet, Take 1 tablet by mouth Every 7 (Seven) Days., Disp: 12 tablet, Rfl: 3  •  atorvastatin " (LIPITOR) 20 MG tablet, TAKE 1 TABLET BY MOUTH DAILY AT BEDTIME, Disp: 30 tablet, Rfl: 11  •  celecoxib (CeleBREX) 200 MG capsule, TAKE 1 CAPSULE BY MOUTH DAILY AS NEEDED FOR MODERATE PAIN, Disp: 30 capsule, Rfl: 3  •  Cholecalciferol (VITAMIN D3) 50 MCG (2000 UT) capsule, TAKE 1 CAPSULE BY MOUTH ONCE DAILY, Disp: 30 capsule, Rfl: 2  •  clotrimazole-betamethasone (LOTRISONE) 1-0.05 % cream, Apply  topically to the appropriate area as directed 2 (Two) Times a Day., Disp: 15 g, Rfl: 0  •  cyclobenzaprine (FLEXERIL) 10 MG tablet, Take 1 tablet by mouth 3 (Three) Times a Day As Needed for Muscle Spasms., Disp: 30 tablet, Rfl: 0  •  diclofenac (VOLTAREN) 1 % gel gel, Apply 4 g topically to the appropriate area as directed 4 (Four) Times a Day., Disp: 100 g, Rfl: 2  •  Flax oil, Take  by mouth., Disp: , Rfl:   •  FLUoxetine (PROzac) 20 MG capsule, TAKE 2 CAPSULES BY MOUTH DAILY, Disp: 60 capsule, Rfl: 11  •  fluticasone (FLONASE) 50 MCG/ACT nasal spray, INSTILL 2 SPRAYS IN EACH NOSTRIL ONCE DAILY, Disp: 15.8 mL, Rfl: 0  •  Magnesium 250 MG tablet, Take 500 mg by mouth Daily., Disp: , Rfl:   •  Multiple Vitamins-Minerals (MULTIVITAMIN WITH MINERALS) tablet tablet, Take 1 tablet by mouth Daily., Disp: , Rfl:   •  ondansetron ODT (ZOFRAN-ODT) 4 MG disintegrating tablet, Take 1 tablet by mouth Every 8 (Eight) Hours As Needed for Nausea or Vomiting., Disp: 30 tablet, Rfl: 1  •  pantoprazole (PROTONIX) 40 MG EC tablet, Take 1 tablet by mouth 30 minutes before breakfast daily., Disp: 30 tablet, Rfl: 5  •  RA ALLERGY RELIEF 180 MG tablet, take 1 tablet by mouth once daily, Disp: 30 tablet, Rfl: 5  •  traZODone (DESYREL) 50 MG tablet, take 1 tablet by mouth at bedtime if needed for sleep, Disp: 30 tablet, Rfl: 5  •  buPROPion XL (WELLBUTRIN XL) 150 MG 24 hr tablet, TAKE 1 TABLET BY MOUTH DAILY EVERY MORNING, Disp: 30 tablet, Rfl: 5        Areli was seen today for hyperlipidemia.    Diagnoses and all orders for this visit:    Mixed  hyperlipidemia  -     Cancel: Comprehensive Metabolic Panel  -     Cancel: Lipid Panel  -     Cancel: TSH    Allergic rhinitis, unspecified seasonality, unspecified trigger    Gastroesophageal reflux disease with esophagitis  -     Cancel: CBC (No Diff)    Chronic superficial gastritis without bleeding    Osteoporosis, unspecified osteoporosis type, unspecified pathological fracture presence    Osteoarthritis of cervical spine, unspecified spinal osteoarthritis complication status    Other orders  -     Comprehensive Metabolic Panel  -     CBC (No Diff)  -     Lipid Panel  -     TSH

## 2020-03-17 LAB
ALBUMIN SERPL-MCNC: 4.6 G/DL (ref 3.5–5.2)
ALBUMIN/GLOB SERPL: 2.3 G/DL
ALP SERPL-CCNC: 60 U/L (ref 39–117)
ALT SERPL-CCNC: 33 U/L (ref 1–33)
AST SERPL-CCNC: 26 U/L (ref 1–32)
BILIRUB SERPL-MCNC: 0.2 MG/DL (ref 0.2–1.2)
BUN SERPL-MCNC: 22 MG/DL (ref 6–20)
BUN/CREAT SERPL: 31.4 (ref 7–25)
CALCIUM SERPL-MCNC: 9.3 MG/DL (ref 8.6–10.5)
CHLORIDE SERPL-SCNC: 98 MMOL/L (ref 98–107)
CHOLEST SERPL-MCNC: 182 MG/DL (ref 0–200)
CO2 SERPL-SCNC: 27.1 MMOL/L (ref 22–29)
CREAT SERPL-MCNC: 0.7 MG/DL (ref 0.57–1)
ERYTHROCYTE [DISTWIDTH] IN BLOOD BY AUTOMATED COUNT: 11.9 % (ref 12.3–15.4)
GLOBULIN SER CALC-MCNC: 2 GM/DL
GLUCOSE SERPL-MCNC: 99 MG/DL (ref 65–99)
HCT VFR BLD AUTO: 38.7 % (ref 34–46.6)
HDLC SERPL-MCNC: 97 MG/DL (ref 40–60)
HGB BLD-MCNC: 13.1 G/DL (ref 12–15.9)
LDLC SERPL CALC-MCNC: 75 MG/DL (ref 0–100)
MCH RBC QN AUTO: 30.5 PG (ref 26.6–33)
MCHC RBC AUTO-ENTMCNC: 33.9 G/DL (ref 31.5–35.7)
MCV RBC AUTO: 90 FL (ref 79–97)
PLATELET # BLD AUTO: 342 10*3/MM3 (ref 140–450)
POTASSIUM SERPL-SCNC: 4.8 MMOL/L (ref 3.5–5.2)
PROT SERPL-MCNC: 6.6 G/DL (ref 6–8.5)
RBC # BLD AUTO: 4.3 10*6/MM3 (ref 3.77–5.28)
SODIUM SERPL-SCNC: 137 MMOL/L (ref 136–145)
TRIGL SERPL-MCNC: 48 MG/DL (ref 0–150)
TSH SERPL DL<=0.005 MIU/L-ACNC: 1.35 UIU/ML (ref 0.27–4.2)
VLDLC SERPL CALC-MCNC: 9.6 MG/DL
WBC # BLD AUTO: 7.43 10*3/MM3 (ref 3.4–10.8)

## 2020-03-30 ENCOUNTER — APPOINTMENT (OUTPATIENT)
Dept: MAMMOGRAPHY | Facility: HOSPITAL | Age: 60
End: 2020-03-30

## 2020-04-09 RX ORDER — FLUTICASONE PROPIONATE 50 MCG
SPRAY, SUSPENSION (ML) NASAL
Qty: 16 G | Refills: 5 | Status: SHIPPED | OUTPATIENT
Start: 2020-04-09 | End: 2020-12-21

## 2020-05-20 DIAGNOSIS — Q39.8 INLET PATCH OF ESOPHAGUS: Chronic | ICD-10-CM

## 2020-05-20 DIAGNOSIS — R12 HEARTBURN: Chronic | ICD-10-CM

## 2020-05-20 DIAGNOSIS — J30.1 SEASONAL ALLERGIC RHINITIS DUE TO POLLEN: ICD-10-CM

## 2020-05-20 DIAGNOSIS — K25.9 GASTRIC ULCER WITHOUT HEMORRHAGE OR PERFORATION, UNSPECIFIED CHRONICITY: ICD-10-CM

## 2020-05-20 RX ORDER — FEXOFENADINE HCL 180 MG/1
TABLET ORAL
Qty: 30 TABLET | Refills: 5 | Status: SHIPPED | OUTPATIENT
Start: 2020-05-20 | End: 2020-12-16

## 2020-05-20 RX ORDER — ACETAMINOPHEN 160 MG
TABLET,DISINTEGRATING ORAL
Qty: 30 CAPSULE | Refills: 2 | Status: SHIPPED | OUTPATIENT
Start: 2020-05-20 | End: 2020-11-06 | Stop reason: SDUPTHER

## 2020-05-21 RX ORDER — PANTOPRAZOLE SODIUM 40 MG/1
TABLET, DELAYED RELEASE ORAL
Qty: 30 TABLET | Refills: 1 | Status: SHIPPED | OUTPATIENT
Start: 2020-05-21 | End: 2020-10-01 | Stop reason: SDUPTHER

## 2020-08-09 ENCOUNTER — HOSPITAL ENCOUNTER (EMERGENCY)
Facility: HOSPITAL | Age: 60
Discharge: HOME | End: 2020-08-09
Attending: STUDENT IN AN ORGANIZED HEALTH CARE EDUCATION/TRAINING PROGRAM | Admitting: STUDENT IN AN ORGANIZED HEALTH CARE EDUCATION/TRAINING PROGRAM

## 2020-08-09 VITALS
DIASTOLIC BLOOD PRESSURE: 76 MMHG | WEIGHT: 111.8 LBS | RESPIRATION RATE: 16 BRPM | HEIGHT: 60 IN | SYSTOLIC BLOOD PRESSURE: 144 MMHG | TEMPERATURE: 98.9 F | BODY MASS INDEX: 21.95 KG/M2 | OXYGEN SATURATION: 97 % | HEART RATE: 114 BPM
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
## 2020-08-09 DIAGNOSIS — L03.113 CELLULITIS OF RIGHT HAND: ICD-10-CM

## 2020-08-09 PROCEDURE — 99283 EMERGENCY DEPT VISIT LOW MDM: CPT

## 2020-08-09 RX ORDER — CLINDAMYCIN HYDROCHLORIDE 300 MG/1
300 CAPSULE ORAL 4 TIMES DAILY
Qty: 40 CAPSULE | Refills: 0 | Status: SHIPPED | OUTPATIENT
Start: 2020-08-09 | End: 2020-09-16

## 2020-08-09 RX ORDER — CLINDAMYCIN HYDROCHLORIDE 150 MG/1
600 CAPSULE ORAL ONCE
Status: COMPLETED | OUTPATIENT
Start: 2020-08-09 | End: 2020-08-09

## 2020-08-09 RX ADMIN — CLINDAMYCIN HYDROCHLORIDE 600 MG: 150 CAPSULE ORAL at 13:06

## 2020-08-09 NOTE — ED PROVIDER NOTES
"Subjective   60-year-old female that presents with concerns for an infection in her right hand after a dog bite.  Patient was bitten by her dog on her right hand greater than 36 hours ago.  She thought it would heal on its own so she cleaned it and left it alone.  Over the past 24 hours it has become red, painful and swollen.  She states it hurts whenever she makes a fist or moves her hand.  She has no systemic symptoms such as fever, chills, sweats, nausea, vomiting or diarrhea.  She states that the pain is severe, constant and aching.          Review of Systems   All other systems reviewed and are negative.      Past Medical History:   Diagnosis Date   • Anemia    • Arthritis    • Body piercing     EARS   • Depression    • Dysphagia     REPORTS SOMETIMES SHE HAS PAIN WHEN SWALLOWING FOOD   • Elevated cholesterol    • Elevated LFTs    • Epigastric pain    • Exposure to TB     REPORTS MANY YEARS AGO. REPORTS TB TESTING HAS ALWAYS BEEN NEGATIVE.    • GERD (gastroesophageal reflux disease)    • History of bronchitis 01/2019   • History of chest pain     REPORTS FALL 2018 AND THAT SHE HAD TESTING THAT WAS WNL'S. REPORTS SHE IS NOW BEING CHECKED FOR GI.    • History of exercise stress test     2018 - REPORTS WAS TOLD ALL WNL'S    • History of fracture     TOE   • Hyperlipidemia    • Latex allergy    • Murmur     REPORTS \"A SLIGHT MURMUR\"   • Osteoporosis    • Plantar fasciitis    • Seasonal allergies    • Vertigo    • Wears glasses     PRN       Allergies   Allergen Reactions   • Latex Rash   • Penicillins Rash       Past Surgical History:   Procedure Laterality Date   • ABDOMINOPLASTY  2003   • AUGMENTATION MAMMAPLASTY Bilateral    • COLONOSCOPY  02/02/2016   • ENDOSCOPY N/A 2/12/2019    Procedure: ESOPHAGOGASTRODUODENOSCOPY with cold biopsy and esophageal dilation;  Surgeon: Brenden Steward MD;  Location: [facility] ENDOSCOPY;  Service: Gastroenterology   • ENDOSCOPY N/A 6/11/2019    Procedure: ESOPHAGOGASTRODUODENOSCOPY " W/ COLD FORCEP BIOPSIES;  Surgeon: Brenden Steward MD;  Location: [facility] ENDOSCOPY;  Service: Gastroenterology   • UPPER GASTROINTESTINAL ENDOSCOPY  2019   • WISDOM TOOTH EXTRACTION         Family History   Problem Relation Age of Onset   • Breast cancer Maternal Aunt    • Lung cancer Mother    • Heart attack Father    • Crohn's disease Sister    • Stomach cancer Paternal Grandfather    • Alcohol abuse Paternal Grandfather    • Colon cancer Neg Hx    • Cirrhosis Neg Hx    • Liver disease Neg Hx    • Liver cancer Neg Hx    • Ulcerative colitis Neg Hx    • Esophageal cancer Neg Hx        Social History     Socioeconomic History   • Marital status: Single     Spouse name: Not on file   • Number of children: Not on file   • Years of education: Not on file   • Highest education level: Not on file   Tobacco Use   • Smoking status: Former Smoker     Packs/day: 0.25     Years: 5.00     Pack years: 1.25     Types: Cigarettes     Last attempt to quit: 2019     Years since quittin.5   • Smokeless tobacco: Never Used   Substance and Sexual Activity   • Alcohol use: Yes     Comment: SOCIAL USE, NO HISTORY OF ABUSE REPORTED   • Drug use: No   • Sexual activity: Defer           Objective   Physical Exam   Nursing note and vitals reviewed.      GEN: No acute distress  Head: Normocephalic, atraumatic  Eyes: Pupils equal round reactive to light  ENT: Posterior pharynx normal in appearance, oral mucosa is moist  Chest: Nontender to palpation  Cardiovascular: Regular rate  Lungs: Clear to auscultation bilaterally  Abdomen: Soft, nontender, nondistended, no peritoneal signs  Extremities: Right hand is mildly swollen over the right dorsum and MCP of the index finger.  There is a scabbed over wound that is sealed.  Patient has pain with flexion or extension of her fingers.  She does not have sausage digits.  There is no streaking at this time.  Neuro: GCS 15  Psych: Mood and affect are appropriate        Procedures            ED Course                                           MDM  Number of Diagnoses or Management Options  Cellulitis of right hand:   Dog bite, initial encounter:   Diagnosis management comments: Patient does not appear to have a deep space infection at this time.  Will initiate treatment with clindamycin.  Patient will be referred to hand surgery in case this does not improve.  Did discuss signs and symptoms to watch for that would prompt an immediate return.       Amount and/or Complexity of Data Reviewed  Decide to obtain previous medical records or to obtain history from someone other than the patient: yes  Obtain history from someone other than the patient: yes  Review and summarize past medical records: yes        Final diagnoses:   Dog bite, initial encounter   Cellulitis of right hand            Dominguez Maurer MD  08/09/20 [id number]

## 2020-09-09 DIAGNOSIS — E78.2 MIXED HYPERLIPIDEMIA: ICD-10-CM

## 2020-09-09 RX ORDER — ATORVASTATIN CALCIUM 20 MG/1
20 TABLET, FILM COATED ORAL
Qty: 30 TABLET | Refills: 11 | Status: SHIPPED | OUTPATIENT
Start: 2020-09-09 | End: 2021-09-23 | Stop reason: SDUPTHER

## 2020-09-10 DIAGNOSIS — Z12.39 BREAST CANCER SCREENING: Primary | ICD-10-CM

## 2020-09-16 ENCOUNTER — LAB REQUISITION (OUTPATIENT)
Dept: LAB | Facility: HOSPITAL | Age: 60
End: 2020-09-16

## 2020-09-16 ENCOUNTER — OFFICE VISIT (OUTPATIENT)
Dept: INTERNAL MEDICINE | Facility: CLINIC | Age: 60
End: 2020-09-16

## 2020-09-16 VITALS
WEIGHT: 110 LBS | SYSTOLIC BLOOD PRESSURE: 120 MMHG | DIASTOLIC BLOOD PRESSURE: 70 MMHG | TEMPERATURE: 97.3 F | BODY MASS INDEX: 22.18 KG/M2 | HEIGHT: 59 IN | HEART RATE: 68 BPM

## 2020-09-16 DIAGNOSIS — K21.00 GASTROESOPHAGEAL REFLUX DISEASE WITH ESOPHAGITIS: ICD-10-CM

## 2020-09-16 DIAGNOSIS — J30.9 ALLERGIC RHINITIS, UNSPECIFIED SEASONALITY, UNSPECIFIED TRIGGER: ICD-10-CM

## 2020-09-16 DIAGNOSIS — E78.2 MIXED HYPERLIPIDEMIA: Primary | ICD-10-CM

## 2020-09-16 DIAGNOSIS — E78.2 MIXED HYPERLIPIDEMIA: ICD-10-CM

## 2020-09-16 DIAGNOSIS — F32.1 CURRENT MODERATE EPISODE OF MAJOR DEPRESSIVE DISORDER, UNSPECIFIED WHETHER RECURRENT (HCC): ICD-10-CM

## 2020-09-16 DIAGNOSIS — G47.09 OTHER INSOMNIA: ICD-10-CM

## 2020-09-16 DIAGNOSIS — K29.30 CHRONIC SUPERFICIAL GASTRITIS WITHOUT BLEEDING: ICD-10-CM

## 2020-09-16 DIAGNOSIS — M81.0 OSTEOPOROSIS, UNSPECIFIED OSTEOPOROSIS TYPE, UNSPECIFIED PATHOLOGICAL FRACTURE PRESENCE: ICD-10-CM

## 2020-09-16 PROBLEM — R07.89 OTHER CHEST PAIN: Status: RESOLVED | Noted: 2019-05-08 | Resolved: 2020-09-16

## 2020-09-16 PROCEDURE — 36415 COLL VENOUS BLD VENIPUNCTURE: CPT | Performed by: INTERNAL MEDICINE

## 2020-09-16 PROCEDURE — 99214 OFFICE O/P EST MOD 30 MIN: CPT | Performed by: INTERNAL MEDICINE

## 2020-09-16 NOTE — PROGRESS NOTES
"Patient is a 60 y.o. female who is here for a follow up of hyperlipidemia.  Chief Complaint   Patient presents with   • Hyperlipidemia         HPI:    Here for mgmt of GERD and hyperlipidemia and insomnia.  Onset years.  Doing well.  No dizziness or lightheadedness.  No fever or chills.  Sleeping well.  Trying to watch his diet.  No HAs.  Has diffuse arthritic complaints.      History:     Patient Active Problem List   Diagnosis   • Allergic rhinitis   • Anxiety state   • Hyperlipidemia   • Osteoporosis   • Gastroesophageal reflux disease with esophagitis   • Other insomnia   • Depressed   • Nausea   • Dysphagia   • Heartburn   • Periumbilical abdominal pain   • Osteoarthritis   • Chronic superficial gastritis without bleeding   • Ganglion cyst of wrist, left   • Esophageal stricture   • Gastric ulcer without hemorrhage or perforation   • Inlet patch of esophagus   • Helicobacter pylori infection   • Paresthesia of both hands   • Ulnar neuropathy at elbow   • Right hip pain       Past Medical History:   Diagnosis Date   • Anemia    • Arthritis    • Body piercing     EARS   • Depression    • Dysphagia     REPORTS SOMETIMES SHE HAS PAIN WHEN SWALLOWING FOOD   • Elevated cholesterol    • Elevated LFTs    • Epigastric pain    • Exposure to TB     REPORTS MANY YEARS AGO. REPORTS TB TESTING HAS ALWAYS BEEN NEGATIVE.    • GERD (gastroesophageal reflux disease)    • History of bronchitis 01/2019   • History of chest pain     REPORTS FALL 2018 AND THAT SHE HAD TESTING THAT WAS WNL'S. REPORTS SHE IS NOW BEING CHECKED FOR GI.    • History of exercise stress test     2018 - REPORTS WAS TOLD ALL WNL'S    • History of fracture     TOE   • Hyperlipidemia    • Latex allergy    • Murmur     REPORTS \"A SLIGHT MURMUR\"   • Osteoporosis    • Plantar fasciitis    • Seasonal allergies    • Vertigo    • Wears glasses     PRN       Past Surgical History:   Procedure Laterality Date   • ABDOMINOPLASTY  2003   • AUGMENTATION MAMMAPLASTY " Bilateral    • COLONOSCOPY  02/02/2016   • ENDOSCOPY N/A 2/12/2019    Procedure: ESOPHAGOGASTRODUODENOSCOPY with cold biopsy and esophageal dilation;  Surgeon: Brenden Steward MD;  Location: [facility] ENDOSCOPY;  Service: Gastroenterology   • ENDOSCOPY N/A 6/11/2019    Procedure: ESOPHAGOGASTRODUODENOSCOPY W/ COLD FORCEP BIOPSIES;  Surgeon: Brenden Steward MD;  Location: [facility] ENDOSCOPY;  Service: Gastroenterology   • UPPER GASTROINTESTINAL ENDOSCOPY  02/12/2019   • WISDOM TOOTH EXTRACTION         Current Outpatient Medications on File Prior to Visit   Medication Sig   • alendronate (FOSAMAX) 70 MG tablet Take 1 tablet by mouth Every 7 (Seven) Days.   • atorvastatin (LIPITOR) 20 MG tablet Take 1 tablet by mouth every night at bedtime.   • buPROPion XL (WELLBUTRIN XL) 150 MG 24 hr tablet TAKE 1 TABLET BY MOUTH DAILY EVERY MORNING   • celecoxib (CeleBREX) 200 MG capsule TAKE 1 CAPSULE BY MOUTH DAILY AS NEEDED FOR MODERATE PAIN   • Cholecalciferol (VITAMIN D3) 50 MCG (2000 UT) capsule TAKE 1 CAPSULE BY MOUTH EVERY DAY   • clotrimazole-betamethasone (LOTRISONE) 1-0.05 % cream Apply  topically to the appropriate area as directed 2 (Two) Times a Day.   • cyclobenzaprine (FLEXERIL) 10 MG tablet Take 1 tablet by mouth 3 (Three) Times a Day As Needed for Muscle Spasms.   • diclofenac (VOLTAREN) 1 % gel gel Apply 4 g topically to the appropriate area as directed 4 (Four) Times a Day.   • fexofenadine (ALLEGRA) 180 MG tablet TAKE 1 TABLET BY MOUTH DAILY   • Flax oil Take  by mouth.   • FLUoxetine (PROzac) 20 MG capsule TAKE 2 CAPSULES BY MOUTH DAILY   • fluticasone (FLONASE) 50 MCG/ACT nasal spray SHAKE LIQUID AND USE 2 SPRAYS IN EACH NOSTRIL EVERY DAY   • Magnesium 250 MG tablet Take 500 mg by mouth Daily.   • Multiple Vitamins-Minerals (MULTIVITAMIN WITH MINERALS) tablet tablet Take 1 tablet by mouth Daily.   • ondansetron ODT (ZOFRAN-ODT) 4 MG disintegrating tablet Take 1 tablet by mouth Every 8 (Eight) Hours As Needed for  Nausea or Vomiting.   • pantoprazole (PROTONIX) 40 MG EC tablet TAKE 1 TABLET BY MOUTH DAILY EVERY MORNING 30 MINUTES BEFORE BREAKFAST. Call for follow up appt for refills.   • traZODone (DESYREL) 50 MG tablet take 1 tablet by mouth at bedtime if needed for sleep   • [DISCONTINUED] clindamycin (CLEOCIN) 300 MG capsule Take 1 capsule by mouth 4 (Four) Times a Day.     No current facility-administered medications on file prior to visit.        Family History   Problem Relation Age of Onset   • Breast cancer Maternal Aunt    • Lung cancer Mother    • Heart attack Father    • Crohn's disease Sister    • Stomach cancer Paternal Grandfather    • Alcohol abuse Paternal Grandfather    • Colon cancer Neg Hx    • Cirrhosis Neg Hx    • Liver disease Neg Hx    • Liver cancer Neg Hx    • Ulcerative colitis Neg Hx    • Esophageal cancer Neg Hx        Social History     Socioeconomic History   • Marital status: Single     Spouse name: Not on file   • Number of children: Not on file   • Years of education: Not on file   • Highest education level: Not on file   Tobacco Use   • Smoking status: Former Smoker     Packs/day: 0.25     Years: 5.00     Pack years: 1.25     Types: Cigarettes     Quit date: 2019     Years since quittin.6   • Smokeless tobacco: Never Used   Substance and Sexual Activity   • Alcohol use: Yes     Comment: SOCIAL USE, NO HISTORY OF ABUSE REPORTED   • Drug use: No   • Sexual activity: Defer         Review of Systems   Constitutional: Negative for chills, fatigue, fever and unexpected weight change.   HENT: Negative for congestion, ear pain, hearing loss, rhinorrhea, sinus pressure, sore throat and trouble swallowing.    Eyes: Negative for discharge and itching.   Respiratory: Negative for cough, chest tightness and shortness of breath.    Cardiovascular: Negative for chest pain, palpitations and leg swelling.        Summer 2018 cardiac w/u neg   Gastrointestinal: Negative for abdominal pain, blood in  "stool, constipation, diarrhea and vomiting.        2/16 colonoscopy normal  9/19 colonoscopy normal   Endocrine: Negative for polydipsia and polyuria.   Genitourinary: Negative for difficulty urinating, dysuria, enuresis, frequency, hematuria and urgency.   Musculoskeletal: Positive for arthralgias. Negative for back pain, gait problem and joint swelling.        See HPI   Skin: Negative for rash and wound.   Allergic/Immunologic: Negative for immunocompromised state.   Neurological: Negative for dizziness, syncope, weakness, light-headedness, numbness and headaches.   Hematological: Does not bruise/bleed easily.   Psychiatric/Behavioral: Negative for behavioral problems, dysphoric mood and sleep disturbance. The patient is not nervous/anxious.        /70   Pulse 68   Temp 97.3 °F (36.3 °C) (Infrared)   Ht 149.9 cm (59.02\")   Wt 49.9 kg (110 lb)   LMP  (LMP Unknown)   BMI 22.21 kg/m²       Physical Exam  Constitutional:       Appearance: Normal appearance. She is well-developed.   HENT:      Head: Normocephalic and atraumatic.      Right Ear: External ear normal.      Left Ear: External ear normal.      Nose: Nose normal.      Mouth/Throat:      Mouth: Mucous membranes are moist.      Pharynx: Oropharynx is clear.   Eyes:      Extraocular Movements: Extraocular movements intact.      Conjunctiva/sclera: Conjunctivae normal.      Pupils: Pupils are equal, round, and reactive to light.   Neck:      Musculoskeletal: Normal range of motion and neck supple.   Cardiovascular:      Rate and Rhythm: Normal rate and regular rhythm.      Heart sounds: Normal heart sounds.   Pulmonary:      Effort: Pulmonary effort is normal.      Breath sounds: Normal breath sounds.   Abdominal:      General: Bowel sounds are normal.      Palpations: Abdomen is soft.   Musculoskeletal: Normal range of motion.   Lymphadenopathy:      Cervical: No cervical adenopathy.   Skin:     General: Skin is warm and dry.   Neurological:      " General: No focal deficit present.      Mental Status: She is alert and oriented to person, place, and time.   Psychiatric:         Mood and Affect: Mood normal.         Behavior: Behavior normal.         Thought Content: Thought content normal.         Procedure:      Discussion/Summary:    GERD/gastritis-cont protonix, stable  Osteoporosis-cont weekly fosamax, stable  Hyperlipidemia-labs today on lipitor at goal, counseled on diet  Insomnia-cont trazodone, controlled  rhintis-stable on allegra and flonase  DJD-controlled on celebrex prn  Depression-on prozac, controlled     9/16 Labs noted and dw patient, counseled on low carb/ncs    Current Outpatient Medications:   •  alendronate (FOSAMAX) 70 MG tablet, Take 1 tablet by mouth Every 7 (Seven) Days., Disp: 12 tablet, Rfl: 3  •  atorvastatin (LIPITOR) 20 MG tablet, Take 1 tablet by mouth every night at bedtime., Disp: 30 tablet, Rfl: 11  •  buPROPion XL (WELLBUTRIN XL) 150 MG 24 hr tablet, TAKE 1 TABLET BY MOUTH DAILY EVERY MORNING, Disp: 30 tablet, Rfl: 5  •  celecoxib (CeleBREX) 200 MG capsule, TAKE 1 CAPSULE BY MOUTH DAILY AS NEEDED FOR MODERATE PAIN, Disp: 30 capsule, Rfl: 3  •  Cholecalciferol (VITAMIN D3) 50 MCG (2000 UT) capsule, TAKE 1 CAPSULE BY MOUTH EVERY DAY, Disp: 30 capsule, Rfl: 2  •  clotrimazole-betamethasone (LOTRISONE) 1-0.05 % cream, Apply  topically to the appropriate area as directed 2 (Two) Times a Day., Disp: 15 g, Rfl: 0  •  cyclobenzaprine (FLEXERIL) 10 MG tablet, Take 1 tablet by mouth 3 (Three) Times a Day As Needed for Muscle Spasms., Disp: 30 tablet, Rfl: 0  •  diclofenac (VOLTAREN) 1 % gel gel, Apply 4 g topically to the appropriate area as directed 4 (Four) Times a Day., Disp: 100 g, Rfl: 2  •  fexofenadine (ALLEGRA) 180 MG tablet, TAKE 1 TABLET BY MOUTH DAILY, Disp: 30 tablet, Rfl: 5  •  Flax oil, Take  by mouth., Disp: , Rfl:   •  FLUoxetine (PROzac) 20 MG capsule, TAKE 2 CAPSULES BY MOUTH DAILY, Disp: 60 capsule, Rfl: 11  •   fluticasone (FLONASE) 50 MCG/ACT nasal spray, SHAKE LIQUID AND USE 2 SPRAYS IN EACH NOSTRIL EVERY DAY, Disp: 16 g, Rfl: 5  •  Magnesium 250 MG tablet, Take 500 mg by mouth Daily., Disp: , Rfl:   •  Multiple Vitamins-Minerals (MULTIVITAMIN WITH MINERALS) tablet tablet, Take 1 tablet by mouth Daily., Disp: , Rfl:   •  ondansetron ODT (ZOFRAN-ODT) 4 MG disintegrating tablet, Take 1 tablet by mouth Every 8 (Eight) Hours As Needed for Nausea or Vomiting., Disp: 30 tablet, Rfl: 1  •  pantoprazole (PROTONIX) 40 MG EC tablet, TAKE 1 TABLET BY MOUTH DAILY EVERY MORNING 30 MINUTES BEFORE BREAKFAST. Call for follow up appt for refills., Disp: 30 tablet, Rfl: 1  •  traZODone (DESYREL) 50 MG tablet, take 1 tablet by mouth at bedtime if needed for sleep, Disp: 30 tablet, Rfl: 5        Areli was seen today for hyperlipidemia.    Diagnoses and all orders for this visit:    Mixed hyperlipidemia  -     Comprehensive Metabolic Panel  -     Lipid Panel  -     TSH    Allergic rhinitis, unspecified seasonality, unspecified trigger    Gastroesophageal reflux disease with esophagitis  -     CBC (No Diff)    Chronic superficial gastritis without bleeding    Osteoporosis, unspecified osteoporosis type, unspecified pathological fracture presence    Other insomnia    Current moderate episode of major depressive disorder, unspecified whether recurrent (CMS/[facility])

## 2020-09-17 LAB
ALBUMIN SERPL-MCNC: 4.8 G/DL (ref 3.5–5.2)
ALBUMIN/GLOB SERPL: 2.7 G/DL
ALP SERPL-CCNC: 64 U/L (ref 39–117)
ALT SERPL-CCNC: 34 U/L (ref 1–33)
AST SERPL-CCNC: 31 U/L (ref 1–32)
BILIRUB SERPL-MCNC: 0.2 MG/DL (ref 0–1.2)
BUN SERPL-MCNC: 18 MG/DL (ref 6–20)
BUN/CREAT SERPL: 26.9 (ref 7–25)
CALCIUM SERPL-MCNC: 9.1 MG/DL (ref 8.6–10.5)
CHLORIDE SERPL-SCNC: 99 MMOL/L (ref 98–107)
CHOLEST SERPL-MCNC: 223 MG/DL (ref 0–200)
CO2 SERPL-SCNC: 24.5 MMOL/L (ref 22–29)
CREAT SERPL-MCNC: 0.67 MG/DL (ref 0.57–1)
ERYTHROCYTE [DISTWIDTH] IN BLOOD BY AUTOMATED COUNT: 12.9 % (ref 12.3–15.4)
GLOBULIN SER CALC-MCNC: 1.8 GM/DL
GLUCOSE SERPL-MCNC: 100 MG/DL (ref 65–99)
HCT VFR BLD AUTO: 39.6 % (ref 34–46.6)
HDLC SERPL-MCNC: 113 MG/DL (ref 40–60)
HGB BLD-MCNC: 12.8 G/DL (ref 12–15.9)
LDLC SERPL CALC-MCNC: 100 MG/DL (ref 0–100)
MCH RBC QN AUTO: 31 PG (ref 26.6–33)
MCHC RBC AUTO-ENTMCNC: 32.3 G/DL (ref 31.5–35.7)
MCV RBC AUTO: 95.9 FL (ref 79–97)
PLATELET # BLD AUTO: 326 10*3/MM3 (ref 140–450)
POTASSIUM SERPL-SCNC: 4.5 MMOL/L (ref 3.5–5.2)
PROT SERPL-MCNC: 6.6 G/DL (ref 6–8.5)
RBC # BLD AUTO: 4.13 10*6/MM3 (ref 3.77–5.28)
SODIUM SERPL-SCNC: 135 MMOL/L (ref 136–145)
TRIGL SERPL-MCNC: 50 MG/DL (ref 0–150)
TSH SERPL DL<=0.005 MIU/L-ACNC: 1.28 UIU/ML (ref 0.27–4.2)
VLDLC SERPL CALC-MCNC: 10 MG/DL
WBC # BLD AUTO: 5.73 10*3/MM3 (ref 3.4–10.8)

## 2020-09-30 DIAGNOSIS — M47.812 OSTEOARTHRITIS OF CERVICAL SPINE, UNSPECIFIED SPINAL OSTEOARTHRITIS COMPLICATION STATUS: ICD-10-CM

## 2020-09-30 DIAGNOSIS — R12 HEARTBURN: Chronic | ICD-10-CM

## 2020-09-30 DIAGNOSIS — Q39.8 INLET PATCH OF ESOPHAGUS: Chronic | ICD-10-CM

## 2020-09-30 DIAGNOSIS — M25.552 LEFT HIP PAIN: ICD-10-CM

## 2020-09-30 DIAGNOSIS — K25.9 GASTRIC ULCER WITHOUT HEMORRHAGE OR PERFORATION, UNSPECIFIED CHRONICITY: ICD-10-CM

## 2020-10-01 DIAGNOSIS — K25.9 GASTRIC ULCER WITHOUT HEMORRHAGE OR PERFORATION, UNSPECIFIED CHRONICITY: ICD-10-CM

## 2020-10-01 DIAGNOSIS — R12 HEARTBURN: Chronic | ICD-10-CM

## 2020-10-01 DIAGNOSIS — Q39.8 INLET PATCH OF ESOPHAGUS: Chronic | ICD-10-CM

## 2020-10-01 RX ORDER — PANTOPRAZOLE SODIUM 40 MG/1
TABLET, DELAYED RELEASE ORAL
Qty: 30 TABLET | Refills: 1 | OUTPATIENT
Start: 2020-10-01

## 2020-10-01 RX ORDER — CELECOXIB 200 MG/1
200 CAPSULE ORAL DAILY PRN
Qty: 30 CAPSULE | Refills: 3 | Status: SHIPPED | OUTPATIENT
Start: 2020-10-01 | End: 2021-01-15

## 2020-10-01 RX ORDER — PANTOPRAZOLE SODIUM 40 MG/1
TABLET, DELAYED RELEASE ORAL
Qty: 90 TABLET | Refills: 3 | Status: SHIPPED | OUTPATIENT
Start: 2020-10-01 | End: 2021-12-17 | Stop reason: SDUPTHER

## 2020-10-01 NOTE — TELEPHONE ENCOUNTER
PT CALLED REQUESTING A REFILL FOR:  pantoprazole (PROTONIX) 40 MG EC tablet    [id number] - [place], [place] - [address] AT [facility] & [place] [address] - [phone] PH - [phone] FX

## 2020-11-03 ENCOUNTER — HOSPITAL ENCOUNTER (OUTPATIENT)
Dept: MAMMOGRAPHY | Facility: HOSPITAL | Age: 60
Discharge: HOME | End: 2020-11-03
Admitting: INTERNAL MEDICINE

## 2020-11-03 DIAGNOSIS — G47.09 OTHER INSOMNIA: ICD-10-CM

## 2020-11-03 PROCEDURE — 77067 SCR MAMMO BI INCL CAD: CPT

## 2020-11-03 PROCEDURE — 77063 BREAST TOMOSYNTHESIS BI: CPT

## 2020-11-03 RX ORDER — TRAZODONE HYDROCHLORIDE 50 MG/1
50 TABLET ORAL NIGHTLY PRN
Qty: 90 TABLET | Refills: 3 | Status: SHIPPED | OUTPATIENT
Start: 2020-11-03 | End: 2021-12-01 | Stop reason: SDUPTHER

## 2020-11-03 NOTE — TELEPHONE ENCOUNTER
PHARMACY CALLED REQUESTING A SCRIPT FOR:  traZODone (DESYREL) 50 MG tablet  PHARMACY HAS NEVER FILLED FOR PT BEFORE    [place], [place] - [address] - [phone]  - [phone] FX

## 2020-11-06 RX ORDER — ACETAMINOPHEN 160 MG
2000 TABLET,DISINTEGRATING ORAL DAILY
Qty: 90 CAPSULE | Refills: 3 | Status: SHIPPED | OUTPATIENT
Start: 2020-11-06 | End: 2021-06-03 | Stop reason: DRUGHIGH

## 2020-11-06 NOTE — TELEPHONE ENCOUNTER
[facility] [place] [place] [facility] [address] - [phone] [facility] [phone] FX    Relationship: Pharmacy    Best call back number: [phone]    Medication needed:   Requested Prescriptions     Pending Prescriptions Disp Refills   • Cholecalciferol (Vitamin D3) 50 MCG (2000 UT) capsule 30 capsule 2     Sig: Take 1 capsule by mouth Daily.       When do you need the refill by: SOON    What is the patient's preferred pharmacy:   [facility] [place] - [address] [phone] [facility] [phone] FX

## 2020-11-13 ENCOUNTER — TELEPHONE (OUTPATIENT)
Dept: INTERNAL MEDICINE | Facility: CLINIC | Age: 60
End: 2020-11-13

## 2020-11-16 ENCOUNTER — PATIENT MESSAGE (OUTPATIENT)
Dept: INTERNAL MEDICINE | Facility: CLINIC | Age: 60
End: 2020-11-16

## 2020-11-23 DIAGNOSIS — M81.0 OSTEOPOROSIS, UNSPECIFIED OSTEOPOROSIS TYPE, UNSPECIFIED PATHOLOGICAL FRACTURE PRESENCE: ICD-10-CM

## 2020-11-23 RX ORDER — ALENDRONATE SODIUM 70 MG/1
TABLET ORAL
Qty: 12 TABLET | Refills: 3 | Status: SHIPPED | OUTPATIENT
Start: 2020-11-23 | End: 2021-03-16

## 2020-12-16 DIAGNOSIS — J30.1 SEASONAL ALLERGIC RHINITIS DUE TO POLLEN: ICD-10-CM

## 2020-12-16 RX ORDER — FEXOFENADINE HCL 180 MG/1
TABLET ORAL
Qty: 90 TABLET | Refills: 3 | Status: SHIPPED | OUTPATIENT
Start: 2020-12-16 | End: 2021-05-10

## 2020-12-21 RX ORDER — FLUTICASONE PROPIONATE 50 MCG
1 SPRAY, SUSPENSION (ML) NASAL 2 TIMES DAILY
Qty: 48 G | Refills: 3 | Status: SHIPPED | OUTPATIENT
Start: 2020-12-21 | End: 2021-06-24 | Stop reason: SDUPTHER

## 2021-01-15 DIAGNOSIS — M47.812 OSTEOARTHRITIS OF CERVICAL SPINE, UNSPECIFIED SPINAL OSTEOARTHRITIS COMPLICATION STATUS: ICD-10-CM

## 2021-01-15 DIAGNOSIS — M25.552 LEFT HIP PAIN: ICD-10-CM

## 2021-01-15 RX ORDER — CELECOXIB 200 MG/1
CAPSULE ORAL
Qty: 30 CAPSULE | Refills: 5 | Status: SHIPPED | OUTPATIENT
Start: 2021-01-15 | End: 2021-05-14 | Stop reason: ALTCHOICE

## 2021-01-20 ENCOUNTER — TELEPHONE (OUTPATIENT)
Dept: INTERNAL MEDICINE | Facility: CLINIC | Age: 61
End: 2021-01-20

## 2021-01-20 DIAGNOSIS — Z80.3 FAMILY HISTORY OF BREAST CANCER: ICD-10-CM

## 2021-01-20 DIAGNOSIS — F32.89 OTHER DEPRESSION: Primary | ICD-10-CM

## 2021-01-20 NOTE — TELEPHONE ENCOUNTER
PATIENT REQUESTING REFERRAL FOR PSYCHIATRIST, AND QUESTION ABOUT BRCA TEST.    PATIENT REQUESTING CHANGING HER ALLERGY MEDICATION.    PATIENT IS SCHEDULED FOR AN OFFICE TO SEE DOCTOR SCHNIEDERS 01/22 @ 11:30.  DOCTOR LORNE DOES NOT HAVE ANY AVAILABILITY UNTIL MARCH.      PLEASE ADVISE:  [phone]

## 2021-01-22 ENCOUNTER — OFFICE VISIT (OUTPATIENT)
Dept: INTERNAL MEDICINE | Facility: CLINIC | Age: 61
End: 2021-01-22

## 2021-01-22 VITALS
WEIGHT: 114 LBS | TEMPERATURE: 97.7 F | DIASTOLIC BLOOD PRESSURE: 64 MMHG | HEIGHT: 60 IN | BODY MASS INDEX: 22.38 KG/M2 | SYSTOLIC BLOOD PRESSURE: 124 MMHG

## 2021-01-22 DIAGNOSIS — J30.1 ALLERGIC RHINITIS DUE TO POLLEN, UNSPECIFIED SEASONALITY: ICD-10-CM

## 2021-01-22 DIAGNOSIS — M25.50 ARTHRALGIA, UNSPECIFIED JOINT: Primary | ICD-10-CM

## 2021-01-22 PROCEDURE — 96372 THER/PROPH/DIAG INJ SC/IM: CPT | Performed by: INTERNAL MEDICINE

## 2021-01-22 PROCEDURE — 99214 OFFICE O/P EST MOD 30 MIN: CPT | Performed by: INTERNAL MEDICINE

## 2021-01-22 RX ORDER — TRIAMCINOLONE ACETONIDE 40 MG/ML
40 INJECTION, SUSPENSION INTRA-ARTICULAR; INTRAMUSCULAR ONCE
Status: COMPLETED | OUTPATIENT
Start: 2021-01-22 | End: 2021-01-22

## 2021-01-22 RX ADMIN — TRIAMCINOLONE ACETONIDE 40 MG: 40 INJECTION, SUSPENSION INTRA-ARTICULAR; INTRAMUSCULAR at 12:12

## 2021-01-22 NOTE — PROGRESS NOTES
"Subjective   Areli Barnett is a 61 y.o. female here for multiple joint pain, allergies. She has pain in the neck and hips and in the right buttock. Hadn't exercised in a long time and resumed it recently in hopes it would help aches and pains. Currently has neck stiffness and pain, bilateral hip pain. Has some right buttock pain. No recent exercise to explain the current issues, seem to be chronic with flare. She also complains of ear canal itchiness, previously saw ENT and they rec she stop ear drops as her ears were drying out from those. Pt has been referred to allergy, didn't want to get further allergy testing.    I have reviewed the following portions of the patient's history and confirmed they are accurate: current medications, past medical history, past social history and problem list     I have personally completed the patient's review of systems.    Review of Systems:  General: negative  HEENT: dry itchy ears  Respiratory: negative  Neuro: negative  MSK: see hpi    Objective   /64   Temp 97.7 °F (36.5 °C) (Temporal)   Ht 152.4 cm (60\")   Wt 51.7 kg (114 lb)   LMP  (LMP Unknown)   BMI 22.26 kg/m²     Physical Exam  Vitals signs reviewed.   Constitutional:       Appearance: She is well-developed.   Pulmonary:      Effort: Pulmonary effort is normal.   Skin:     General: Skin is warm and dry.   Neurological:      Mental Status: She is alert and oriented to person, place, and time.   Psychiatric:         Behavior: Behavior normal.         Thought Content: Thought content normal.         Judgment: Judgment normal.         Assessment/Plan   Diagnoses and all orders for this visit:    1. Arthralgia, unspecified joint (Primary)  -     triamcinolone acetonide (KENALOG-40) injection 40 mg  -advised pt to do some stretching and steroid inj should help calm down many of her joint pains. Consider PT if no improvement. Can also try tylenol PRN, avoid NSAIDs due to previous gastritis.    2. Allergic rhinitis " due to pollen, unspecified seasonality  -continue antihistamine           Char Johnson MA    Please note that portions of this note were completed with a voice recognition program. Efforts were made to edit the dictations, but occasionally words are mistranscribed.

## 2021-01-25 DIAGNOSIS — F32.1 CURRENT MODERATE EPISODE OF MAJOR DEPRESSIVE DISORDER, UNSPECIFIED WHETHER RECURRENT (HCC): ICD-10-CM

## 2021-01-25 RX ORDER — BUPROPION HYDROCHLORIDE 150 MG/1
TABLET ORAL
Qty: 90 TABLET | Refills: 3 | Status: SHIPPED | OUTPATIENT
Start: 2021-01-25 | End: 2021-06-24 | Stop reason: ALTCHOICE

## 2021-01-25 RX ORDER — BUPROPION HYDROCHLORIDE 150 MG/1
TABLET ORAL
Qty: 30 TABLET | Refills: 1 | Status: CANCELLED | OUTPATIENT
Start: 2021-01-25

## 2021-01-25 NOTE — TELEPHONE ENCOUNTER
Last Office Visit: 01/22/2021  Next Office Visit: 03/31/2021    Labs completed in past 6 months? yes  Labs completed in past year? yes    Last Refill Date: 02/05/2020  Quantity: 30  Refills: 5    Pharmacy:   [id number] - [place] - [address] AT [facility] [address] - [phone] PH - [phone] FX   Phone:  [phone]   Fax:  [phone]   Address:  [place] [address] [place] [address]

## 2021-01-26 ENCOUNTER — TELEPHONE (OUTPATIENT)
Dept: INTERNAL MEDICINE | Facility: CLINIC | Age: 61
End: 2021-01-26

## 2021-01-26 RX ORDER — CYCLOBENZAPRINE HCL 10 MG
10 TABLET ORAL 3 TIMES DAILY PRN
Qty: 30 TABLET | Refills: 1 | Status: SHIPPED | OUTPATIENT
Start: 2021-01-26 | End: 2021-02-09

## 2021-01-26 NOTE — TELEPHONE ENCOUNTER
Caller: Areli Barnett    Relationship: Self    Best call back number:    What medication are you requesting: MUSCLE RELAXANT     What are your current symptoms: ACES AND PAIN EVERYWHERE, BACK, HIP, LEGS     How long have you been experiencing symptoms: TWO WEEKS   Have you had these symptoms before:    [x] Yes  [] No    Have you been treated for these symptoms before:   [x] Yes  [] No    If a prescription is needed, what is your preferred pharmacy and phone number: [facility] [place], [place] - [address] - [phone]  - [phone] FX     Additional notes:

## 2021-02-09 RX ORDER — CYCLOBENZAPRINE HCL 10 MG
TABLET ORAL
Qty: 30 TABLET | Refills: 2 | Status: SHIPPED | OUTPATIENT
Start: 2021-02-09 | End: 2021-05-10

## 2021-02-22 DIAGNOSIS — F32.1 CURRENT MODERATE EPISODE OF MAJOR DEPRESSIVE DISORDER, UNSPECIFIED WHETHER RECURRENT (HCC): ICD-10-CM

## 2021-02-24 RX ORDER — FLUOXETINE HYDROCHLORIDE 20 MG/1
CAPSULE ORAL
Qty: 60 CAPSULE | Refills: 2 | Status: SHIPPED | OUTPATIENT
Start: 2021-02-24 | End: 2021-05-19

## 2021-03-15 DIAGNOSIS — M81.0 OSTEOPOROSIS, UNSPECIFIED OSTEOPOROSIS TYPE, UNSPECIFIED PATHOLOGICAL FRACTURE PRESENCE: ICD-10-CM

## 2021-03-16 RX ORDER — ALENDRONATE SODIUM 70 MG/1
TABLET ORAL
Qty: 12 TABLET | Refills: 3 | Status: SHIPPED | OUTPATIENT
Start: 2021-03-16 | End: 2021-11-23 | Stop reason: SDUPTHER

## 2021-03-31 ENCOUNTER — LAB (OUTPATIENT)
Dept: LAB | Facility: HOSPITAL | Age: 61
End: 2021-03-31

## 2021-03-31 ENCOUNTER — OFFICE VISIT (OUTPATIENT)
Dept: INTERNAL MEDICINE | Facility: CLINIC | Age: 61
End: 2021-03-31

## 2021-03-31 VITALS
RESPIRATION RATE: 16 BRPM | SYSTOLIC BLOOD PRESSURE: 130 MMHG | WEIGHT: 111 LBS | TEMPERATURE: 96.9 F | HEIGHT: 60 IN | OXYGEN SATURATION: 98 % | BODY MASS INDEX: 21.79 KG/M2 | DIASTOLIC BLOOD PRESSURE: 80 MMHG | HEART RATE: 97 BPM

## 2021-03-31 DIAGNOSIS — M25.50 PAIN IN JOINT INVOLVING MULTIPLE SITES: ICD-10-CM

## 2021-03-31 DIAGNOSIS — K29.30 CHRONIC SUPERFICIAL GASTRITIS WITHOUT BLEEDING: ICD-10-CM

## 2021-03-31 DIAGNOSIS — J30.9 ALLERGIC RHINITIS, UNSPECIFIED SEASONALITY, UNSPECIFIED TRIGGER: Primary | ICD-10-CM

## 2021-03-31 DIAGNOSIS — E78.2 MIXED HYPERLIPIDEMIA: ICD-10-CM

## 2021-03-31 LAB — CHROMATIN AB SERPL-ACNC: <10 IU/ML (ref 0–14)

## 2021-03-31 PROCEDURE — 99214 OFFICE O/P EST MOD 30 MIN: CPT | Performed by: INTERNAL MEDICINE

## 2021-03-31 PROCEDURE — 86431 RHEUMATOID FACTOR QUANT: CPT | Performed by: INTERNAL MEDICINE

## 2021-03-31 RX ORDER — TRETINOIN 0.5 MG/G
CREAM TOPICAL NIGHTLY PRN
Qty: 20 G | Refills: 2 | Status: SHIPPED | OUTPATIENT
Start: 2021-03-31 | End: 2022-10-25 | Stop reason: SDUPTHER

## 2021-03-31 NOTE — PROGRESS NOTES
"Patient is a 61 y.o. female who is here for a physical.  Chief Complaint   Patient presents with   • Annual Exam         HPI:    Here for mgmt ot hyperlipidemia and GERD and DJD.  Just hurts all over.  No prior w/u for autoimmune disease.  Has FH of breast cancer in her younger sister and she would like genetic testing.  GERD is controlled.  Compliant statin and trying to watch her diet.      History:     Patient Active Problem List   Diagnosis   • Allergic rhinitis   • Anxiety state   • Hyperlipidemia   • Osteoporosis   • Gastroesophageal reflux disease with esophagitis   • Other insomnia   • Depressed   • Nausea   • Dysphagia   • Heartburn   • Periumbilical abdominal pain   • Osteoarthritis   • Chronic superficial gastritis without bleeding   • Ganglion cyst of wrist, left   • Esophageal stricture   • Gastric ulcer without hemorrhage or perforation   • Inlet patch of esophagus   • Helicobacter pylori infection   • Paresthesia of both hands   • Ulnar neuropathy at elbow   • Right hip pain   • Pain in joint involving multiple sites       Past Medical History:   Diagnosis Date   • Anemia    • Arthritis    • Body piercing     EARS   • Depression    • Dysphagia     REPORTS SOMETIMES SHE HAS PAIN WHEN SWALLOWING FOOD   • Elevated cholesterol    • Elevated LFTs    • Epigastric pain    • Exposure to TB     REPORTS MANY YEARS AGO. REPORTS TB TESTING HAS ALWAYS BEEN NEGATIVE.    • GERD (gastroesophageal reflux disease)    • History of bronchitis 01/2019   • History of chest pain     REPORTS FALL 2018 AND THAT SHE HAD TESTING THAT WAS WNL'S. REPORTS SHE IS NOW BEING CHECKED FOR GI.    • History of exercise stress test     2018 - REPORTS WAS TOLD ALL WNL'S    • History of fracture     TOE   • Hyperlipidemia    • Latex allergy    • Murmur     REPORTS \"A SLIGHT MURMUR\"   • Osteoporosis    • Plantar fasciitis    • Seasonal allergies    • Vertigo    • Wears glasses     PRN       Past Surgical History:   Procedure Laterality Date "   • ABDOMINOPLASTY  2003   • AUGMENTATION MAMMAPLASTY Bilateral    • COLONOSCOPY  02/02/2016   • ENDOSCOPY N/A 2/12/2019    Procedure: ESOPHAGOGASTRODUODENOSCOPY with cold biopsy and esophageal dilation;  Surgeon: Brenden Steward MD;  Location: [facility] ENDOSCOPY;  Service: Gastroenterology   • ENDOSCOPY N/A 6/11/2019    Procedure: ESOPHAGOGASTRODUODENOSCOPY W/ COLD FORCEP BIOPSIES;  Surgeon: Brenden Steward MD;  Location: [facility] ENDOSCOPY;  Service: Gastroenterology   • UPPER GASTROINTESTINAL ENDOSCOPY  02/12/2019   • WISDOM TOOTH EXTRACTION         Current Outpatient Medications on File Prior to Visit   Medication Sig   • alendronate (FOSAMAX) 70 MG tablet TAKE ONE TABLET BY MOUTH EVERY 7 DAYS   • atorvastatin (LIPITOR) 20 MG tablet Take 1 tablet by mouth every night at bedtime.   • buPROPion XL (WELLBUTRIN XL) 150 MG 24 hr tablet TAKE ONE TABLET BY MOUTH EVERY MORNING   • celecoxib (CeleBREX) 200 MG capsule TAKE ONE CAPSULE BY MOUTH EVERY DAY AS NEEDED FOR MODERATE PAIN   • Cholecalciferol (Vitamin D3) 50 MCG (2000 UT) capsule Take 1 capsule by mouth Daily.   • clotrimazole-betamethasone (LOTRISONE) 1-0.05 % cream Apply  topically to the appropriate area as directed 2 (Two) Times a Day.   • cyclobenzaprine (FLEXERIL) 10 MG tablet TAKE TAKE ONE TABLET BY MOUTH THREE TIMES A DAY AS NEEDED FOR MUSCLE SPASMS. DO NOT DRIVE AFTER TAKING A DOSE   • diclofenac (VOLTAREN) 1 % gel gel Apply 4 g topically to the appropriate area as directed 4 (Four) Times a Day.   • fexofenadine (ALLEGRA) 180 MG tablet TAKE ONE TABLET BY MOUTH EVERY DAY   • Flax oil Take  by mouth.   • FLUoxetine (PROzac) 20 MG capsule TAKE TWO CAPSULES BY MOUTH EVERY DAY   • fluticasone (FLONASE) 50 MCG/ACT nasal spray 1 spray into the nostril(s) as directed by provider 2 (two) times a day. SHAKE LIQUID AND INSTILL 2 SPRAYS IN EACH NOSTRIL EVERY DAY   • Magnesium 250 MG tablet Take 500 mg by mouth Daily.   • Multiple Vitamins-Minerals (MULTIVITAMIN WITH  MINERALS) tablet tablet Take 1 tablet by mouth Daily.   • ondansetron ODT (ZOFRAN-ODT) 4 MG disintegrating tablet Take 1 tablet by mouth Every 8 (Eight) Hours As Needed for Nausea or Vomiting.   • pantoprazole (PROTONIX) 40 MG EC tablet TAKE 1 TABLET BY MOUTH DAILY EVERY MORNING 30 MINUTES BEFORE BREAKFAST. Call for follow up appt for refills.   • traZODone (DESYREL) 50 MG tablet Take 1 tablet by mouth At Night As Needed for Sleep. for sleep     No current facility-administered medications on file prior to visit.       Family History   Problem Relation Age of Onset   • Breast cancer Maternal Aunt    • Lung cancer Mother    • Heart attack Father    • Crohn's disease Sister    • Breast cancer Sister    • Stomach cancer Paternal Grandfather    • Alcohol abuse Paternal Grandfather    • Colon cancer Neg Hx    • Cirrhosis Neg Hx    • Liver disease Neg Hx    • Liver cancer Neg Hx    • Ulcerative colitis Neg Hx    • Esophageal cancer Neg Hx        Social History     Socioeconomic History   • Marital status: Single     Spouse name: Not on file   • Number of children: Not on file   • Years of education: Not on file   • Highest education level: Not on file   Tobacco Use   • Smoking status: Former Smoker     Packs/day: 0.25     Years: 5.00     Pack years: 1.25     Types: Cigarettes     Quit date: 2019     Years since quittin.1   • Smokeless tobacco: Never Used   Substance and Sexual Activity   • Alcohol use: Yes     Comment: SOCIAL USE, NO HISTORY OF ABUSE REPORTED   • Drug use: No   • Sexual activity: Defer         Review of Systems   Constitutional: Negative for chills, fatigue, fever and unexpected weight change.   HENT: Negative for congestion, ear pain, hearing loss, rhinorrhea, sinus pressure, sore throat and trouble swallowing.    Eyes: Negative for discharge and itching.   Respiratory: Negative for cough, chest tightness and shortness of breath.    Cardiovascular: Negative for chest pain, palpitations and leg  "swelling.        Summer 2018 cardiac w/u neg   Gastrointestinal: Negative for abdominal pain, blood in stool, constipation, diarrhea and vomiting.        2/16 colonoscopy normal  9/19 colonoscopy normal   Endocrine: Negative for polydipsia and polyuria.   Genitourinary: Negative for difficulty urinating, dysuria, enuresis, frequency, hematuria and urgency.   Musculoskeletal: Positive for arthralgias. Negative for back pain, gait problem and joint swelling.        See HPI   Skin: Negative for rash and wound.   Allergic/Immunologic: Negative for immunocompromised state.   Neurological: Negative for dizziness, syncope, weakness, light-headedness, numbness and headaches.   Hematological: Does not bruise/bleed easily.   Psychiatric/Behavioral: Negative for behavioral problems, dysphoric mood and sleep disturbance. The patient is not nervous/anxious.        /80   Pulse 97   Temp 96.9 °F (36.1 °C) (Temporal)   Resp 16   Ht 152.4 cm (60\")   Wt 50.3 kg (111 lb)   LMP  (LMP Unknown)   SpO2 98%   Breastfeeding No   BMI 21.68 kg/m²       Physical Exam  Constitutional:       Appearance: Normal appearance. She is well-developed.   HENT:      Head: Normocephalic and atraumatic.      Right Ear: External ear normal.      Left Ear: External ear normal.      Nose: Nose normal.      Mouth/Throat:      Mouth: Mucous membranes are moist.      Pharynx: Oropharynx is clear.   Eyes:      Extraocular Movements: Extraocular movements intact.      Conjunctiva/sclera: Conjunctivae normal.      Pupils: Pupils are equal, round, and reactive to light.   Cardiovascular:      Rate and Rhythm: Normal rate and regular rhythm.      Heart sounds: Normal heart sounds.   Pulmonary:      Effort: Pulmonary effort is normal.      Breath sounds: Normal breath sounds.   Abdominal:      General: Bowel sounds are normal.      Palpations: Abdomen is soft.   Musculoskeletal:         General: Normal range of motion.      Cervical back: Normal range " of motion and neck supple.   Lymphadenopathy:      Cervical: No cervical adenopathy.   Skin:     General: Skin is warm and dry.   Neurological:      General: No focal deficit present.      Mental Status: She is alert and oriented to person, place, and time.   Psychiatric:         Mood and Affect: Mood normal.         Behavior: Behavior normal.         Thought Content: Thought content normal.         Procedure:      Discussion/Summary:    GERD/gastritis-controlled on protonix  Osteoporosis-cont weekly fosamax, stable  Hyperlipidemia-labs today on lipitor at goal, counseled on diet  Insomnia-cont trazodone, controlled  rhintis-stable on allegra and flonase  DJD-controlled on celebrex prn, autoimmune w/u pending, dw patient  Depression-on prozac, controlled     3/31 Labs noted and dw patient, counseled on low carb/ncs    Current Outpatient Medications:   •  alendronate (FOSAMAX) 70 MG tablet, TAKE ONE TABLET BY MOUTH EVERY 7 DAYS, Disp: 12 tablet, Rfl: 3  •  atorvastatin (LIPITOR) 20 MG tablet, Take 1 tablet by mouth every night at bedtime., Disp: 30 tablet, Rfl: 11  •  buPROPion XL (WELLBUTRIN XL) 150 MG 24 hr tablet, TAKE ONE TABLET BY MOUTH EVERY MORNING, Disp: 90 tablet, Rfl: 3  •  celecoxib (CeleBREX) 200 MG capsule, TAKE ONE CAPSULE BY MOUTH EVERY DAY AS NEEDED FOR MODERATE PAIN, Disp: 30 capsule, Rfl: 5  •  Cholecalciferol (Vitamin D3) 50 MCG (2000 UT) capsule, Take 1 capsule by mouth Daily., Disp: 90 capsule, Rfl: 3  •  clotrimazole-betamethasone (LOTRISONE) 1-0.05 % cream, Apply  topically to the appropriate area as directed 2 (Two) Times a Day., Disp: 15 g, Rfl: 0  •  cyclobenzaprine (FLEXERIL) 10 MG tablet, TAKE TAKE ONE TABLET BY MOUTH THREE TIMES A DAY AS NEEDED FOR MUSCLE SPASMS. DO NOT DRIVE AFTER TAKING A DOSE, Disp: 30 tablet, Rfl: 2  •  diclofenac (VOLTAREN) 1 % gel gel, Apply 4 g topically to the appropriate area as directed 4 (Four) Times a Day., Disp: 100 g, Rfl: 2  •  fexofenadine (ALLEGRA) 180 MG  tablet, TAKE ONE TABLET BY MOUTH EVERY DAY, Disp: 90 tablet, Rfl: 3  •  Flax oil, Take  by mouth., Disp: , Rfl:   •  FLUoxetine (PROzac) 20 MG capsule, TAKE TWO CAPSULES BY MOUTH EVERY DAY, Disp: 60 capsule, Rfl: 2  •  fluticasone (FLONASE) 50 MCG/ACT nasal spray, 1 spray into the nostril(s) as directed by provider 2 (two) times a day. SHAKE LIQUID AND INSTILL 2 SPRAYS IN EACH NOSTRIL EVERY DAY, Disp: 48 g, Rfl: 3  •  Magnesium 250 MG tablet, Take 500 mg by mouth Daily., Disp: , Rfl:   •  Multiple Vitamins-Minerals (MULTIVITAMIN WITH MINERALS) tablet tablet, Take 1 tablet by mouth Daily., Disp: , Rfl:   •  ondansetron ODT (ZOFRAN-ODT) 4 MG disintegrating tablet, Take 1 tablet by mouth Every 8 (Eight) Hours As Needed for Nausea or Vomiting., Disp: 30 tablet, Rfl: 1  •  pantoprazole (PROTONIX) 40 MG EC tablet, TAKE 1 TABLET BY MOUTH DAILY EVERY MORNING 30 MINUTES BEFORE BREAKFAST. Call for follow up appt for refills., Disp: 90 tablet, Rfl: 3  •  traZODone (DESYREL) 50 MG tablet, Take 1 tablet by mouth At Night As Needed for Sleep. for sleep, Disp: 90 tablet, Rfl: 3  •  tretinoin (Retin-A) 0.05 % cream, Apply  topically to the appropriate area as directed At Night As Needed (acne)., Disp: 20 g, Rfl: 2        Diagnoses and all orders for this visit:    1. Allergic rhinitis, unspecified seasonality, unspecified trigger (Primary)    2. Mixed hyperlipidemia  -     Comprehensive Metabolic Panel  -     Lipid Panel  -     TSH    3. Chronic superficial gastritis without bleeding  -     CBC (No Diff)    4. Pain in joint involving multiple sites  -     MARY ANN With / DsDNA, RNP, Sjogrens A / B, Smith  -     C-reactive Protein  -     Cyclic Citrul Peptide Antibody, IgG / IgA  -     Rheumatoid Factor  -     Sedimentation Rate  -     Uric Acid    Other orders  -     tretinoin (Retin-A) 0.05 % cream; Apply  topically to the appropriate area as directed At Night As Needed (acne).  Dispense: 20 g; Refill: 2

## 2021-04-02 LAB
ALBUMIN SERPL-MCNC: 5 G/DL (ref 3.8–4.9)
ALBUMIN/GLOB SERPL: 2.2 {RATIO} (ref 1.2–2.2)
ALP SERPL-CCNC: 57 IU/L (ref 39–117)
ALT SERPL-CCNC: 29 IU/L (ref 0–32)
ANA SER QL: NEGATIVE
AST SERPL-CCNC: 36 IU/L (ref 0–40)
BILIRUB SERPL-MCNC: 0.5 MG/DL (ref 0–1.2)
BUN SERPL-MCNC: 13 MG/DL (ref 6–24)
BUN/CREAT SERPL: 15 (ref 9–23)
CALCIUM SERPL-MCNC: 9.6 MG/DL (ref 8.7–10.2)
CCP IGA+IGG SERPL IA-ACNC: 6 UNITS (ref 0–19)
CHLORIDE SERPL-SCNC: 95 MMOL/L (ref 96–106)
CHOLEST SERPL-MCNC: 198 MG/DL (ref 100–199)
CO2 SERPL-SCNC: 19 MMOL/L (ref 20–29)
CREAT SERPL-MCNC: 0.85 MG/DL (ref 0.57–1)
CRP SERPL-MCNC: 2 MG/L (ref 0–10)
ERYTHROCYTE [DISTWIDTH] IN BLOOD BY AUTOMATED COUNT: 12.2 % (ref 11.7–15.4)
ERYTHROCYTE [SEDIMENTATION RATE] IN BLOOD BY WESTERGREN METHOD: 7 MM/HR (ref 0–40)
GLOBULIN SER CALC-MCNC: 2.3 G/DL (ref 1.5–4.5)
GLUCOSE SERPL-MCNC: 86 MG/DL (ref 65–99)
HCT VFR BLD AUTO: 39.8 % (ref 34–46.6)
HDLC SERPL-MCNC: 123 MG/DL
HGB BLD-MCNC: 13.8 G/DL (ref 11.1–15.9)
LDLC SERPL CALC-MCNC: 64 MG/DL (ref 0–99)
MCH RBC QN AUTO: 32.3 PG (ref 26.6–33)
MCHC RBC AUTO-ENTMCNC: 34.7 G/DL (ref 31.5–35.7)
MCV RBC AUTO: 93 FL (ref 79–97)
PLATELET # BLD AUTO: 325 X10E3/UL (ref 150–450)
POTASSIUM SERPL-SCNC: 4.6 MMOL/L (ref 3.5–5.2)
PROT SERPL-MCNC: 7.3 G/DL (ref 6–8.5)
RBC # BLD AUTO: 4.27 X10E6/UL (ref 3.77–5.28)
SODIUM SERPL-SCNC: 134 MMOL/L (ref 134–144)
TRIGL SERPL-MCNC: 56 MG/DL (ref 0–149)
TSH SERPL DL<=0.005 MIU/L-ACNC: 1.41 UIU/ML (ref 0.45–4.5)
URATE SERPL-MCNC: 3.7 MG/DL (ref 3–7.2)
VLDLC SERPL CALC-MCNC: 11 MG/DL (ref 5–40)
WBC # BLD AUTO: 6.9 X10E3/UL (ref 3.4–10.8)

## 2021-04-26 ENCOUNTER — TELEPHONE (OUTPATIENT)
Dept: GENETICS | Facility: HOSPITAL | Age: 61
End: 2021-04-26

## 2021-04-26 NOTE — TELEPHONE ENCOUNTER
Called to confirm Telehealth appt. Patient stated not interested in Genetics at this time. Wanted to cancel appt. Sent in-basket message to Dr Proctor with update.

## 2021-05-10 ENCOUNTER — OFFICE VISIT (OUTPATIENT)
Dept: INTERNAL MEDICINE | Facility: CLINIC | Age: 61
End: 2021-05-10

## 2021-05-10 VITALS
OXYGEN SATURATION: 97 % | SYSTOLIC BLOOD PRESSURE: 158 MMHG | DIASTOLIC BLOOD PRESSURE: 80 MMHG | WEIGHT: 111.4 LBS | TEMPERATURE: 96.9 F | HEIGHT: 60 IN | HEART RATE: 98 BPM | BODY MASS INDEX: 21.87 KG/M2

## 2021-05-10 DIAGNOSIS — F32.1 CURRENT MODERATE EPISODE OF MAJOR DEPRESSIVE DISORDER, UNSPECIFIED WHETHER RECURRENT (HCC): ICD-10-CM

## 2021-05-10 DIAGNOSIS — R00.2 PALPITATIONS: ICD-10-CM

## 2021-05-10 DIAGNOSIS — J30.9 ALLERGIC RHINITIS, UNSPECIFIED SEASONALITY, UNSPECIFIED TRIGGER: ICD-10-CM

## 2021-05-10 DIAGNOSIS — I10 ESSENTIAL HYPERTENSION: ICD-10-CM

## 2021-05-10 DIAGNOSIS — E78.49 OTHER HYPERLIPIDEMIA: Primary | ICD-10-CM

## 2021-05-10 DIAGNOSIS — M25.551 RIGHT HIP PAIN: ICD-10-CM

## 2021-05-10 DIAGNOSIS — K25.9 GASTRIC ULCER WITHOUT HEMORRHAGE OR PERFORATION, UNSPECIFIED CHRONICITY: ICD-10-CM

## 2021-05-10 PROCEDURE — 93000 ELECTROCARDIOGRAM COMPLETE: CPT | Performed by: INTERNAL MEDICINE

## 2021-05-10 PROCEDURE — 99214 OFFICE O/P EST MOD 30 MIN: CPT | Performed by: INTERNAL MEDICINE

## 2021-05-10 RX ORDER — TRAMADOL HYDROCHLORIDE 50 MG/1
50 TABLET ORAL DAILY PRN
Qty: 30 TABLET | Refills: 0 | Status: SHIPPED | OUTPATIENT
Start: 2021-05-10 | End: 2021-06-02

## 2021-05-10 RX ORDER — METOPROLOL SUCCINATE 25 MG/1
25 TABLET, EXTENDED RELEASE ORAL NIGHTLY
Qty: 30 TABLET | Refills: 5 | Status: SHIPPED | OUTPATIENT
Start: 2021-05-10 | End: 2021-06-03

## 2021-05-10 RX ORDER — CETIRIZINE HYDROCHLORIDE 10 MG/1
10 TABLET ORAL NIGHTLY
Qty: 30 TABLET | Refills: 5 | Status: SHIPPED | OUTPATIENT
Start: 2021-05-10 | End: 2021-06-24 | Stop reason: SDUPTHER

## 2021-05-10 RX ORDER — CYCLOBENZAPRINE HCL 10 MG
TABLET ORAL
Qty: 30 TABLET | Refills: 2 | Status: SHIPPED | OUTPATIENT
Start: 2021-05-10 | End: 2021-12-07 | Stop reason: SDUPTHER

## 2021-05-10 NOTE — PROGRESS NOTES
"Patient is a 61 y.o. female who is here to discuss pain management.  Chief Complaint   Patient presents with   • Pain         HPI:    Here for mgmt of low back pain and right hip pain.  Worst with activity.  Onset about months.  Wet weather worsens.  Using heating pad.  Using Celebrex without help.  Tried her boyfriends tramadol with some help.  Workup for autoimmune dz was neg.    GERD is controlled with the meds.  Compliant with statin.  Depression is ok.     History:     Patient Active Problem List   Diagnosis   • Allergic rhinitis   • Anxiety state   • Other hyperlipidemia   • Osteoporosis   • Gastroesophageal reflux disease with esophagitis   • Other insomnia   • Depressed   • Nausea   • Dysphagia   • Heartburn   • Periumbilical abdominal pain   • Osteoarthritis   • Chronic superficial gastritis without bleeding   • Ganglion cyst of wrist, left   • Esophageal stricture   • Gastric ulcer without hemorrhage or perforation   • Inlet patch of esophagus   • Helicobacter pylori infection   • Paresthesia of both hands   • Ulnar neuropathy at elbow   • Right hip pain   • Pain in joint involving multiple sites   • Palpitations   • Essential hypertension       Past Medical History:   Diagnosis Date   • Anemia    • Arthritis    • Body piercing     EARS   • Depression    • Dysphagia     REPORTS SOMETIMES SHE HAS PAIN WHEN SWALLOWING FOOD   • Elevated cholesterol    • Elevated LFTs    • Epigastric pain    • Exposure to TB     REPORTS MANY YEARS AGO. REPORTS TB TESTING HAS ALWAYS BEEN NEGATIVE.    • GERD (gastroesophageal reflux disease)    • History of bronchitis 01/2019   • History of chest pain     REPORTS FALL 2018 AND THAT SHE HAD TESTING THAT WAS WNL'S. REPORTS SHE IS NOW BEING CHECKED FOR GI.    • History of exercise stress test     2018 - REPORTS WAS TOLD ALL WNL'S    • History of fracture     TOE   • Hyperlipidemia    • Latex allergy    • Murmur     REPORTS \"A SLIGHT MURMUR\"   • Osteoporosis    • Plantar fasciitis  "   • Seasonal allergies    • Vertigo    • Wears glasses     PRN       Past Surgical History:   Procedure Laterality Date   • ABDOMINOPLASTY  2003   • AUGMENTATION MAMMAPLASTY Bilateral    • COLONOSCOPY  02/02/2016   • ENDOSCOPY N/A 2/12/2019    Procedure: ESOPHAGOGASTRODUODENOSCOPY with cold biopsy and esophageal dilation;  Surgeon: Brenden Steward MD;  Location: [facility] ENDOSCOPY;  Service: Gastroenterology   • ENDOSCOPY N/A 6/11/2019    Procedure: ESOPHAGOGASTRODUODENOSCOPY W/ COLD FORCEP BIOPSIES;  Surgeon: Brenden Steward MD;  Location: [facility] ENDOSCOPY;  Service: Gastroenterology   • UPPER GASTROINTESTINAL ENDOSCOPY  02/12/2019   • WISDOM TOOTH EXTRACTION         Current Outpatient Medications on File Prior to Visit   Medication Sig   • alendronate (FOSAMAX) 70 MG tablet TAKE ONE TABLET BY MOUTH EVERY 7 DAYS   • atorvastatin (LIPITOR) 20 MG tablet Take 1 tablet by mouth every night at bedtime.   • buPROPion XL (WELLBUTRIN XL) 150 MG 24 hr tablet TAKE ONE TABLET BY MOUTH EVERY MORNING   • celecoxib (CeleBREX) 200 MG capsule TAKE ONE CAPSULE BY MOUTH EVERY DAY AS NEEDED FOR MODERATE PAIN   • Cholecalciferol (Vitamin D3) 50 MCG (2000 UT) capsule Take 1 capsule by mouth Daily.   • clotrimazole-betamethasone (LOTRISONE) 1-0.05 % cream Apply  topically to the appropriate area as directed 2 (Two) Times a Day.   • cyclobenzaprine (FLEXERIL) 10 MG tablet TAKE TAKE ONE TABLET BY MOUTH THREE TIMES A DAY AS NEEDED FOR MUSCLE SPASMS. DO NOT DRIVE AFTER TAKING A DOSE   • diclofenac (VOLTAREN) 1 % gel gel Apply 4 g topically to the appropriate area as directed 4 (Four) Times a Day.   • Flax oil Take  by mouth.   • FLUoxetine (PROzac) 20 MG capsule TAKE TWO CAPSULES BY MOUTH EVERY DAY   • fluticasone (FLONASE) 50 MCG/ACT nasal spray 1 spray into the nostril(s) as directed by provider 2 (two) times a day. SHAKE LIQUID AND INSTILL 2 SPRAYS IN EACH NOSTRIL EVERY DAY   • Magnesium 250 MG tablet Take 500 mg by mouth Daily.   •  Multiple Vitamins-Minerals (MULTIVITAMIN WITH MINERALS) tablet tablet Take 1 tablet by mouth Daily.   • ondansetron ODT (ZOFRAN-ODT) 4 MG disintegrating tablet Take 1 tablet by mouth Every 8 (Eight) Hours As Needed for Nausea or Vomiting.   • pantoprazole (PROTONIX) 40 MG EC tablet TAKE 1 TABLET BY MOUTH DAILY EVERY MORNING 30 MINUTES BEFORE BREAKFAST. Call for follow up appt for refills.   • traZODone (DESYREL) 50 MG tablet Take 1 tablet by mouth At Night As Needed for Sleep. for sleep   • tretinoin (Retin-A) 0.05 % cream Apply  topically to the appropriate area as directed At Night As Needed (acne).   • [DISCONTINUED] fexofenadine (ALLEGRA) 180 MG tablet TAKE ONE TABLET BY MOUTH EVERY DAY     No current facility-administered medications on file prior to visit.       Family History   Problem Relation Age of Onset   • Breast cancer Maternal Aunt    • Lung cancer Mother    • Heart attack Father    • Crohn's disease Sister    • Breast cancer Sister    • Stomach cancer Paternal Grandfather    • Alcohol abuse Paternal Grandfather    • Colon cancer Neg Hx    • Cirrhosis Neg Hx    • Liver disease Neg Hx    • Liver cancer Neg Hx    • Ulcerative colitis Neg Hx    • Esophageal cancer Neg Hx        Social History     Socioeconomic History   • Marital status: Single     Spouse name: Not on file   • Number of children: Not on file   • Years of education: Not on file   • Highest education level: Not on file   Tobacco Use   • Smoking status: Former Smoker     Packs/day: 0.25     Years: 5.00     Pack years: 1.25     Types: Cigarettes     Quit date: 2019     Years since quittin.2   • Smokeless tobacco: Never Used   Substance and Sexual Activity   • Alcohol use: Yes     Comment: SOCIAL USE, NO HISTORY OF ABUSE REPORTED   • Drug use: No   • Sexual activity: Defer         Review of Systems   Constitutional: Negative for chills, fatigue, fever and unexpected weight change.   HENT: Negative for congestion, ear pain, hearing  "loss, rhinorrhea, sinus pressure, sore throat and trouble swallowing.    Eyes: Negative for discharge and itching.   Respiratory: Negative for cough, chest tightness and shortness of breath.    Cardiovascular: Positive for palpitations. Negative for chest pain and leg swelling.        Summer 2018 cardiac w/u neg   Gastrointestinal: Negative for abdominal pain, blood in stool, constipation, diarrhea and vomiting.        2/16 colonoscopy normal  9/19 colonoscopy normal   Endocrine: Negative for polydipsia and polyuria.   Genitourinary: Negative for difficulty urinating, dysuria, enuresis, frequency, hematuria and urgency.   Musculoskeletal: Positive for arthralgias and back pain. Negative for gait problem and joint swelling.        See HPI   Skin: Negative for rash and wound.   Allergic/Immunologic: Positive for environmental allergies. Negative for immunocompromised state.   Neurological: Negative for dizziness, syncope, weakness, light-headedness, numbness and headaches.   Hematological: Does not bruise/bleed easily.   Psychiatric/Behavioral: Negative for behavioral problems, dysphoric mood and sleep disturbance. The patient is not nervous/anxious.        /80   Pulse 98   Temp 96.9 °F (36.1 °C) (Infrared)   Ht 152.4 cm (60\")   Wt 50.5 kg (111 lb 6.4 oz)   LMP  (LMP Unknown)   SpO2 97%   BMI 21.76 kg/m²       Physical Exam  Constitutional:       Appearance: Normal appearance. She is well-developed.   HENT:      Head: Normocephalic and atraumatic.      Right Ear: External ear normal.      Left Ear: External ear normal.      Nose: Nose normal.      Mouth/Throat:      Mouth: Mucous membranes are moist.      Pharynx: Oropharynx is clear.   Eyes:      Extraocular Movements: Extraocular movements intact.      Conjunctiva/sclera: Conjunctivae normal.      Pupils: Pupils are equal, round, and reactive to light.   Cardiovascular:      Rate and Rhythm: Regular rhythm. Tachycardia present.      Heart sounds: Normal " "heart sounds.   Pulmonary:      Effort: Pulmonary effort is normal.      Breath sounds: Normal breath sounds.   Abdominal:      General: Bowel sounds are normal.      Palpations: Abdomen is soft.   Musculoskeletal:      Cervical back: Normal range of motion and neck supple.      Comments: Pain on flexion past 45 degrees   Lymphadenopathy:      Cervical: No cervical adenopathy.   Skin:     General: Skin is warm and dry.   Neurological:      General: No focal deficit present.      Mental Status: She is alert and oriented to person, place, and time.   Psychiatric:         Mood and Affect: Mood normal.         Behavior: Behavior normal.         Thought Content: Thought content normal.         Procedure:      ECG 12 Lead    Date/Time: 5/10/2021 11:31 AM  Performed by: Dawson Proctor MD  Authorized by: Dawson Proctor MD   Comparison: not compared with previous ECG   Rhythm: sinus tachycardia  Rate: tachycardic  Conduction: conduction normal  ST Segments: ST segments normal  T Waves: T waves normal  QRS axis: normal  Other: no other findings    Clinical impression: abnormal EKG            Discussion/Summary:    GERD/gastritis-stable on protonix  Osteoporosis-cont weekly fosamax, stable  Hyperlipidemia-labs 3/31 on lipitor at goal, counseled on diet  Insomnia-cont trazodone, controlled  rhintis-change to zyrtec and cont flonase  DJD/low back pain-cont on celebrex prn, autoimmune w/u neg, trial of tramadol low dose, dw patient risk  Depression-on prozac/wellbutrin, controlled  Tachycardia/HTN-start low dose BB, advised to reduce \"energy drinks\"     3/31 Labs noted and dw patient, counseled on low carb/ncs    Current Outpatient Medications:   •  alendronate (FOSAMAX) 70 MG tablet, TAKE ONE TABLET BY MOUTH EVERY 7 DAYS, Disp: 12 tablet, Rfl: 3  •  atorvastatin (LIPITOR) 20 MG tablet, Take 1 tablet by mouth every night at bedtime., Disp: 30 tablet, Rfl: 11  •  buPROPion XL (WELLBUTRIN XL) 150 MG 24 hr tablet, TAKE ONE " TABLET BY MOUTH EVERY MORNING, Disp: 90 tablet, Rfl: 3  •  celecoxib (CeleBREX) 200 MG capsule, TAKE ONE CAPSULE BY MOUTH EVERY DAY AS NEEDED FOR MODERATE PAIN, Disp: 30 capsule, Rfl: 5  •  Cholecalciferol (Vitamin D3) 50 MCG (2000 UT) capsule, Take 1 capsule by mouth Daily., Disp: 90 capsule, Rfl: 3  •  clotrimazole-betamethasone (LOTRISONE) 1-0.05 % cream, Apply  topically to the appropriate area as directed 2 (Two) Times a Day., Disp: 15 g, Rfl: 0  •  cyclobenzaprine (FLEXERIL) 10 MG tablet, TAKE TAKE ONE TABLET BY MOUTH THREE TIMES A DAY AS NEEDED FOR MUSCLE SPASMS. DO NOT DRIVE AFTER TAKING A DOSE, Disp: 30 tablet, Rfl: 2  •  diclofenac (VOLTAREN) 1 % gel gel, Apply 4 g topically to the appropriate area as directed 4 (Four) Times a Day., Disp: 100 g, Rfl: 2  •  Flax oil, Take  by mouth., Disp: , Rfl:   •  FLUoxetine (PROzac) 20 MG capsule, TAKE TWO CAPSULES BY MOUTH EVERY DAY, Disp: 60 capsule, Rfl: 2  •  fluticasone (FLONASE) 50 MCG/ACT nasal spray, 1 spray into the nostril(s) as directed by provider 2 (two) times a day. SHAKE LIQUID AND INSTILL 2 SPRAYS IN EACH NOSTRIL EVERY DAY, Disp: 48 g, Rfl: 3  •  Magnesium 250 MG tablet, Take 500 mg by mouth Daily., Disp: , Rfl:   •  Multiple Vitamins-Minerals (MULTIVITAMIN WITH MINERALS) tablet tablet, Take 1 tablet by mouth Daily., Disp: , Rfl:   •  ondansetron ODT (ZOFRAN-ODT) 4 MG disintegrating tablet, Take 1 tablet by mouth Every 8 (Eight) Hours As Needed for Nausea or Vomiting., Disp: 30 tablet, Rfl: 1  •  pantoprazole (PROTONIX) 40 MG EC tablet, TAKE 1 TABLET BY MOUTH DAILY EVERY MORNING 30 MINUTES BEFORE BREAKFAST. Call for follow up appt for refills., Disp: 90 tablet, Rfl: 3  •  traZODone (DESYREL) 50 MG tablet, Take 1 tablet by mouth At Night As Needed for Sleep. for sleep, Disp: 90 tablet, Rfl: 3  •  tretinoin (Retin-A) 0.05 % cream, Apply  topically to the appropriate area as directed At Night As Needed (acne)., Disp: 20 g, Rfl: 2  •  cetirizine (zyrTEC) 10  MG tablet, Take 1 tablet by mouth Every Night., Disp: 30 tablet, Rfl: 5  •  metoprolol succinate XL (Toprol XL) 25 MG 24 hr tablet, Take 1 tablet by mouth Every Night., Disp: 30 tablet, Rfl: 5  •  traMADol (ULTRAM) 50 MG tablet, Take 1 tablet by mouth Daily As Needed for Moderate Pain ., Disp: 30 tablet, Rfl: 0        Diagnoses and all orders for this visit:    1. Other hyperlipidemia (Primary)    2. Gastric ulcer without hemorrhage or perforation, unspecified chronicity    3. Current moderate episode of major depressive disorder, unspecified whether recurrent (CMS/HCC)    4. Palpitations  -     metoprolol succinate XL (Toprol XL) 25 MG 24 hr tablet; Take 1 tablet by mouth Every Night.  Dispense: 30 tablet; Refill: 5    5. Essential hypertension  -     metoprolol succinate XL (Toprol XL) 25 MG 24 hr tablet; Take 1 tablet by mouth Every Night.  Dispense: 30 tablet; Refill: 5    6. Right hip pain  -     XR Hip With or Without Pelvis 2 - 3 View Right  -     Ambulatory Referral to Physical Therapy Evaluate and treat  -     traMADol (ULTRAM) 50 MG tablet; Take 1 tablet by mouth Daily As Needed for Moderate Pain .  Dispense: 30 tablet; Refill: 0    7. Allergic rhinitis, unspecified seasonality, unspecified trigger  -     cetirizine (zyrTEC) 10 MG tablet; Take 1 tablet by mouth Every Night.  Dispense: 30 tablet; Refill: 5    Other orders  -     ECG 12 Lead

## 2021-05-11 ENCOUNTER — TELEPHONE (OUTPATIENT)
Dept: INTERNAL MEDICINE | Facility: CLINIC | Age: 61
End: 2021-05-11

## 2021-05-11 ENCOUNTER — HOSPITAL ENCOUNTER (OUTPATIENT)
Dept: GENERAL RADIOLOGY | Facility: HOSPITAL | Age: 61
Discharge: HOME | End: 2021-05-11
Admitting: INTERNAL MEDICINE

## 2021-05-11 DIAGNOSIS — M25.551 RIGHT HIP PAIN: Primary | ICD-10-CM

## 2021-05-11 PROCEDURE — 73502 X-RAY EXAM HIP UNI 2-3 VIEWS: CPT

## 2021-05-11 NOTE — TELEPHONE ENCOUNTER
Caller: Areli Barnett    Relationship: Self    Best call back number: [phone]    What is the medical concern/diagnosis: PAIN IN BOTH LEGS AND HER BACK    What specialty or service is being requested: BONE SPECIALIST    Any additional details: DR PRADHAN REFERRED THE PATIENT TO A PHYSICAL THERAPIST BUT SHE IS WANTING TO SEE A BONE SPECIALIST FIRST BECAUSE OF THE PAIN. ALSO SHE STATED THE TRAMADOL IS NOT WORKING FOR THE PAIN. PLEASE CALL AND ADVISE -[phone]

## 2021-05-12 ENCOUNTER — TELEPHONE (OUTPATIENT)
Dept: INTERNAL MEDICINE | Facility: CLINIC | Age: 61
End: 2021-05-12

## 2021-05-12 NOTE — TELEPHONE ENCOUNTER
Caller: Areli Barnett    Relationship to patient: Self    Best call back number: [phone]     Patient is needing:   PATIENT SAID SHE IF GETTING A REFERRAL FOR HER HIP PAIN AND KNOWS THAT CAN TAKE SOME TIME.  PATIENT IS REQUESTING A CALL BACK TO DISCUSS GETTING A STEROID INJECTION TO HELP WITH THE PAIN UNTIL SHE CAN SEE ORTHOPEDIST.

## 2021-05-14 ENCOUNTER — OFFICE VISIT (OUTPATIENT)
Dept: ORTHOPEDIC SURGERY | Facility: CLINIC | Age: 61
End: 2021-05-14

## 2021-05-14 VITALS
BODY MASS INDEX: 21.86 KG/M2 | WEIGHT: 111.33 LBS | HEART RATE: 95 BPM | DIASTOLIC BLOOD PRESSURE: 69 MMHG | SYSTOLIC BLOOD PRESSURE: 141 MMHG | HEIGHT: 60 IN

## 2021-05-14 DIAGNOSIS — M54.16 RADICULOPATHY, LUMBAR REGION: ICD-10-CM

## 2021-05-14 DIAGNOSIS — M47.816 LUMBAR SPONDYLOSIS: Primary | ICD-10-CM

## 2021-05-14 PROCEDURE — 99204 OFFICE O/P NEW MOD 45 MIN: CPT | Performed by: ORTHOPAEDIC SURGERY

## 2021-05-14 RX ORDER — ASPIRIN 81 MG/1
TABLET ORAL
COMMUNITY
End: 2021-06-03 | Stop reason: DRUGHIGH

## 2021-05-14 RX ORDER — ACETAMINOPHEN 325 MG/1
2 TABLET ORAL EVERY 6 HOURS SCHEDULED
COMMUNITY
End: 2022-01-18

## 2021-05-14 RX ORDER — METHYLPREDNISOLONE 4 MG/1
TABLET ORAL
Qty: 21 TABLET | Refills: 0 | Status: SHIPPED | OUTPATIENT
Start: 2021-05-14 | End: 2021-06-03

## 2021-05-14 RX ORDER — MELOXICAM 15 MG/1
TABLET ORAL
Qty: 90 TABLET | Refills: 1 | Status: SHIPPED | OUTPATIENT
Start: 2021-05-14 | End: 2021-06-24 | Stop reason: SDUPTHER

## 2021-05-14 NOTE — PROGRESS NOTES
"Orthopaedic Clinic Note: Hip New Patient    Chief Complaint   Patient presents with   • Right Hip - Pain        HPI  Consult from: Dawson Proctor MD    Areli Barnett is a 61 y.o. female who presents with right hip pain for 2 year(s). Onset atraumatic and gradual in nature. Pain is localized to gluteal region and is radiating down leg and is a 2/10 on the pain scale.Pain is described as aching. Associated symptoms include pain and stiffness.  The pain is worse with walking, standing and sitting; ice, assistive device (cane/walker) and pain medication and/or NSAID improve the pain. Previous treatments have included: cane/walker and NSAIDS since symptom onset. Although some transient relief was reported with these interventions, these conservative measures have failed and symptoms have persisted. The patient is limited in daily activities and has had a significant decrease in quality of life as a result. She denies fevers, chills, or constitutional symptoms.    I have reviewed the following portions of the patient's history:History of Present Illness    Past Medical History:   Diagnosis Date   • Anemia    • Arthritis    • Body piercing     EARS   • Depression    • Dysphagia     REPORTS SOMETIMES SHE HAS PAIN WHEN SWALLOWING FOOD   • Elevated cholesterol    • Elevated LFTs    • Epigastric pain    • Exposure to TB     REPORTS MANY YEARS AGO. REPORTS TB TESTING HAS ALWAYS BEEN NEGATIVE.    • GERD (gastroesophageal reflux disease)    • History of bronchitis 01/2019   • History of chest pain     REPORTS FALL 2018 AND THAT SHE HAD TESTING THAT WAS WNL'S. REPORTS SHE IS NOW BEING CHECKED FOR GI.    • History of exercise stress test     2018 - REPORTS WAS TOLD ALL WNL'S    • History of fracture     TOE   • Hyperlipidemia    • Latex allergy    • Murmur     REPORTS \"A SLIGHT MURMUR\"   • Osteoporosis    • Plantar fasciitis    • Seasonal allergies    • Vertigo    • Wears glasses     PRN      Past Surgical History:   Procedure " Laterality Date   • ABDOMINOPLASTY     • AUGMENTATION MAMMAPLASTY Bilateral    • COLONOSCOPY  2016   • ENDOSCOPY N/A 2019    Procedure: ESOPHAGOGASTRODUODENOSCOPY with cold biopsy and esophageal dilation;  Surgeon: Brenden Steward MD;  Location: [facility] ENDOSCOPY;  Service: Gastroenterology   • ENDOSCOPY N/A 2019    Procedure: ESOPHAGOGASTRODUODENOSCOPY W/ COLD FORCEP BIOPSIES;  Surgeon: Brenden Steward MD;  Location: [facility] ENDOSCOPY;  Service: Gastroenterology   • UPPER GASTROINTESTINAL ENDOSCOPY  2019   • WISDOM TOOTH EXTRACTION        Family History   Problem Relation Age of Onset   • Breast cancer Maternal Aunt    • Lung cancer Mother    • Heart attack Father    • Crohn's disease Sister    • Breast cancer Sister    • Stomach cancer Paternal Grandfather    • Alcohol abuse Paternal Grandfather    • Colon cancer Neg Hx    • Cirrhosis Neg Hx    • Liver disease Neg Hx    • Liver cancer Neg Hx    • Ulcerative colitis Neg Hx    • Esophageal cancer Neg Hx      Social History     Socioeconomic History   • Marital status: Single     Spouse name: Not on file   • Number of children: Not on file   • Years of education: Not on file   • Highest education level: Not on file   Tobacco Use   • Smoking status: Former Smoker     Packs/day: 0.25     Years: 5.00     Pack years: 1.25     Types: Cigarettes     Quit date: 2019     Years since quittin.2   • Smokeless tobacco: Never Used   Substance and Sexual Activity   • Alcohol use: Yes     Comment: SOCIAL USE, NO HISTORY OF ABUSE REPORTED   • Drug use: No   • Sexual activity: Defer      Current Outpatient Medications on File Prior to Visit   Medication Sig Dispense Refill   • Acetaminophen (TYLENOL PO) Take  by mouth.     • alendronate (FOSAMAX) 70 MG tablet TAKE ONE TABLET BY MOUTH EVERY 7 DAYS 12 tablet 3   • aspirin (aspirin) 81 MG EC tablet aspirin 81 mg tablet,delayed release     • atorvastatin (LIPITOR) 20 MG tablet Take 1 tablet by mouth every  night at bedtime. 30 tablet 11   • buPROPion XL (WELLBUTRIN XL) 150 MG 24 hr tablet TAKE ONE TABLET BY MOUTH EVERY MORNING 90 tablet 3   • cetirizine (zyrTEC) 10 MG tablet Take 1 tablet by mouth Every Night. 30 tablet 5   • Cholecalciferol (Vitamin D3) 50 MCG (2000 UT) capsule Take 1 capsule by mouth Daily. 90 capsule 3   • clotrimazole-betamethasone (LOTRISONE) 1-0.05 % cream Apply  topically to the appropriate area as directed 2 (Two) Times a Day. 15 g 0   • cyclobenzaprine (FLEXERIL) 10 MG tablet TAKE ONE TABLET BY MOUTH THREE TIMES A DAY AS NEEDED FOR MUSCLE SPASMS. DO NOT DRIVE AFTER TAKING A DOSE 30 tablet 2   • diclofenac (VOLTAREN) 1 % gel gel Apply 4 g topically to the appropriate area as directed 4 (Four) Times a Day. 100 g 2   • Flax oil Take  by mouth.     • FLUoxetine (PROzac) 20 MG capsule TAKE TWO CAPSULES BY MOUTH EVERY DAY 60 capsule 2   • fluticasone (FLONASE) 50 MCG/ACT nasal spray 1 spray into the nostril(s) as directed by provider 2 (two) times a day. SHAKE LIQUID AND INSTILL 2 SPRAYS IN EACH NOSTRIL EVERY DAY 48 g 3   • Magnesium 250 MG tablet Take 500 mg by mouth Daily.     • metoprolol succinate XL (Toprol XL) 25 MG 24 hr tablet Take 1 tablet by mouth Every Night. 30 tablet 5   • Multiple Vitamins-Minerals (MULTIVITAMIN WITH MINERALS) tablet tablet Take 1 tablet by mouth Daily.     • mupirocin (BACTROBAN) 2 % ointment mupirocin 2 % topical ointment   APPLY A SMALL AMOUNT TO THE AFFECTED AREA BY TOPICAL ROUTE 3 TIMES PER DAY     • ondansetron ODT (ZOFRAN-ODT) 4 MG disintegrating tablet Take 1 tablet by mouth Every 8 (Eight) Hours As Needed for Nausea or Vomiting. 30 tablet 1   • pantoprazole (PROTONIX) 40 MG EC tablet TAKE 1 TABLET BY MOUTH DAILY EVERY MORNING 30 MINUTES BEFORE BREAKFAST. Call for follow up appt for refills. 90 tablet 3   • traMADol (ULTRAM) 50 MG tablet Take 1 tablet by mouth Daily As Needed for Moderate Pain . 30 tablet 0   • traZODone (DESYREL) 50 MG tablet Take 1 tablet by  "mouth At Night As Needed for Sleep. for sleep 90 tablet 3   • tretinoin (Retin-A) 0.05 % cream Apply  topically to the appropriate area as directed At Night As Needed (acne). 20 g 2   • [DISCONTINUED] celecoxib (CeleBREX) 200 MG capsule TAKE ONE CAPSULE BY MOUTH EVERY DAY AS NEEDED FOR MODERATE PAIN 30 capsule 5     No current facility-administered medications on file prior to visit.      Allergies   Allergen Reactions   • Latex Rash   • Penicillins Rash        Review of Systems   Constitutional: Negative.    HENT: Negative.    Eyes: Negative.    Respiratory: Negative.    Cardiovascular: Negative.    Gastrointestinal: Negative.    Endocrine: Negative.    Genitourinary: Negative.    Musculoskeletal: Positive for arthralgias.   Skin: Negative.    Allergic/Immunologic: Negative.    Neurological: Negative.    Hematological: Negative.    Psychiatric/Behavioral: Negative.         The patient's Review of Systems was personally reviewed and confirmed as accurate.    The following portions of the patient's history were reviewed and updated as appropriate: allergies, current medications, past family history, past medical history, past social history, past surgical history and problem list.    Physical Exam  Blood pressure 141/69, pulse 95, height 152.4 cm (60\"), weight 50.5 kg (111 lb 5.3 oz), not currently breastfeeding.    Body mass index is 21.74 kg/m².    GENERAL APPEARANCE: awake, alert & oriented x 3, in no acute distress and well developed, well nourished  PSYCH: normal affect  LUNGS:  breathing nonlabored  EYES: sclera anicteric  CARDIOVASCULAR: palpable dorsalis pedis, palpable posterior tibial bilaterally. Capillary refill less than 2 seconds  EXTREMITIES: no clubbing, cyanosis  GAIT:  Antalgic           Right Hip Exam:  RANGE OF MOTION:   FLEXION CONTRACTURE: None   FLEXION: 110 degrees   INTERNAL ROTATION: 20 degrees at 90 degrees of flexion   EXTERNAL ROTATION: 40 degrees at 90 degrees of flexion    PAIN WITH HIP " MOTION: no  PAIN WITH LOGROLL: no  STINCHFIELD TEST: negative    KNEE EXAM: full knee ROM (0-120 degrees), stable to varus and valgus stress at terminal extension and 30 degrees flexion     STRENGTH:  5/5 hip adduction, abduction, flexion. 5/5 strength knee flexion, extension. 5/5 strength ankle dorsiflexion and plantarflexion.     GREATER TROCHANTER BURSAL PAIN:  No  Tender to palpation at the lumbar paraspinal musculature.     REFLEXES:   PATELLAR 2+/4   ACHILLES 2+/4    CLONUS: negative  STRAIGHT LEG TEST:   negative    SENSATION TO LIGHT TOUCH:  DEEP PERONEAL/SUPERFICIAL PERONEAL/SURAL/SAPHENOUS/TIBIAL:  intact    EDEMA:   no  ERYTHEMA:  no  WOUNDS/INCISIONS: no overlying skin problems.      Left Hip Exam:   RANGE OF MOTION:   FLEXION CONTRACTURE: None   FLEXION: 110 degrees   INTERNAL ROTATION: 20 degrees at 90 degrees of flexion   EXTERNAL ROTATION: 40 degrees at 90 degrees of flexion    PAIN WITH HIP MOTION: no  PAIN WITH LOGROLL: no  STINCHFIELD TEST: negative    KNEE EXAM: full knee ROM (0-120 degrees), stable to varus and valgus stress at terminal extension and 30 degrees flexion     STRENGTH:  5/5 hip adduction, abduction, flexion. 5/5 strength knee flexion, extension. 5/5 strength ankle dorsiflexion and plantarflexion.     GREATER TROCHANTER BURSAL PAIN:  no     REFLEXES:   PATELLAR 2+/4   ACHILLES 2+/4    CLONUS: negative  STRAIGHT LEG TEST:   negative    SENSATION TO LIGHT TOUCH:  DEEP PERONEAL/SUPERFICIAL PERONEAL/SURAL/SAPHENOUS/TIBIAL:  intact    EDEMA:   no  ERYTHEMA:  no  WOUNDS/INCISIONS: no overlying skin problems.      ------------------------------------------------------------------    LEG LENGTHS:  equal  _____________________________________________________  _____________________________________________________    RADIOGRAPHIC FINDINGS:   AP pelvis and right hip radiographs from 5/11/2021 are personally reviewed.  Radiographs demonstrate mild arthritis of the bilateral hips with no acute  bony injury or fracture.  Joint spaces are well-preserved.    Assessment/Plan:   Diagnosis Plan   1. Lumbar spondylosis  meloxicam (MOBIC) 15 MG tablet    methylPREDNISolone (MEDROL) 4 MG dose pack   2. Radiculopathy, lumbar region  meloxicam (MOBIC) 15 MG tablet    methylPREDNISolone (MEDROL) 4 MG dose pack     Patient has asymptomatic and symmetric hip range of motion on exam today.  The majority of her pain is localized to the lumbar paraspinal musculature and is radiating down the leg.  Clinically she has lumbar spondylosis with suspected radiculopathy.  She is already scheduled to undergo physical therapy starting this afternoon.  I will prescribe her meloxicam as she has stopped taking the Celebrex due to the interaction with tramadol per her pharmacist.  I will also prescribe her a Medrol Dosepak.  If her symptoms fail to improve after a 6-week course of physical therapy, recommend referral to a spine doctor for further evaluation as her hip exam is benign at this time.    Mychal Coe MD  05/14/21  10:47 EDT

## 2021-05-19 DIAGNOSIS — F32.1 CURRENT MODERATE EPISODE OF MAJOR DEPRESSIVE DISORDER, UNSPECIFIED WHETHER RECURRENT (HCC): ICD-10-CM

## 2021-05-19 RX ORDER — FLUOXETINE HYDROCHLORIDE 20 MG/1
CAPSULE ORAL
Qty: 60 CAPSULE | Refills: 11 | Status: SHIPPED | OUTPATIENT
Start: 2021-05-19 | End: 2021-12-07 | Stop reason: SDUPTHER

## 2021-05-20 ENCOUNTER — HOSPITAL ENCOUNTER (OUTPATIENT)
Dept: GENERAL RADIOLOGY | Facility: HOSPITAL | Age: 61
Discharge: HOME | End: 2021-05-20
Admitting: INTERNAL MEDICINE

## 2021-05-20 ENCOUNTER — TELEPHONE (OUTPATIENT)
Dept: INTERNAL MEDICINE | Facility: CLINIC | Age: 61
End: 2021-05-20

## 2021-05-20 ENCOUNTER — TELEPHONE (OUTPATIENT)
Dept: ORTHOPEDIC SURGERY | Facility: CLINIC | Age: 61
End: 2021-05-20

## 2021-05-20 DIAGNOSIS — G89.29 CHRONIC MIDLINE LOW BACK PAIN WITH RIGHT-SIDED SCIATICA: Primary | ICD-10-CM

## 2021-05-20 DIAGNOSIS — M54.41 CHRONIC MIDLINE LOW BACK PAIN WITH RIGHT-SIDED SCIATICA: Primary | ICD-10-CM

## 2021-05-20 PROCEDURE — 72100 X-RAY EXAM L-S SPINE 2/3 VWS: CPT

## 2021-05-20 NOTE — TELEPHONE ENCOUNTER
I called and spoke with the patient and gave her the information from Dr. Coe.    She is seeing a chiropractor, but I told her a spine surgeon is someone different.      Kerri Swift

## 2021-05-20 NOTE — TELEPHONE ENCOUNTER
SW PATIENT. SHE IS SAYING SHE IS STILL IN A LOT OF PAIN AND WAS WONDERING IF THERE WAS ANYTHING ELSE THAT SHE COULD BE PRESCRIBED TO HELP WITH HER PAIN. SHE REQUESTS A CALL BACK [phone]

## 2021-05-20 NOTE — TELEPHONE ENCOUNTER
Patient states that dr Coe said she needs to see a spine doctor because he doesn't see patients for spine, so patient wants a referral to see one.

## 2021-05-20 NOTE — TELEPHONE ENCOUNTER
She is already on the strongest medications I would prescribe for her diagnosis.  Her problem is her back/lumbar spine.  She needs to see a spine doctor.  I recommend discussing this with her PCP.  If her PCP wishes to prescribe her a stronger pain medication or narcotic, that is up to Dr. Proctor.

## 2021-06-01 ENCOUNTER — TELEPHONE (OUTPATIENT)
Dept: INTERNAL MEDICINE | Facility: CLINIC | Age: 61
End: 2021-06-01

## 2021-06-01 DIAGNOSIS — M25.551 RIGHT HIP PAIN: ICD-10-CM

## 2021-06-01 NOTE — TELEPHONE ENCOUNTER
PT WAS SEEN HERE SEVERAL YEARS AGO AND WE DIDN'T TAKE HER INSURANCE.  PT WANTS TO KNOW IF DR FERGUSON WOULD SEE HER AGAIN?

## 2021-06-02 RX ORDER — TRAMADOL HYDROCHLORIDE 50 MG/1
50 TABLET ORAL DAILY PRN
Qty: 30 TABLET | Refills: 0 | Status: SHIPPED | OUTPATIENT
Start: 2021-06-02 | End: 2021-10-25

## 2021-06-03 ENCOUNTER — TREATMENT (OUTPATIENT)
Dept: PHYSICAL THERAPY | Facility: CLINIC | Age: 61
End: 2021-06-03

## 2021-06-03 ENCOUNTER — OFFICE VISIT (OUTPATIENT)
Dept: INTERNAL MEDICINE | Facility: CLINIC | Age: 61
End: 2021-06-03

## 2021-06-03 VITALS
HEART RATE: 110 BPM | OXYGEN SATURATION: 96 % | WEIGHT: 118.2 LBS | BODY MASS INDEX: 23.2 KG/M2 | DIASTOLIC BLOOD PRESSURE: 72 MMHG | TEMPERATURE: 98.6 F | HEIGHT: 60 IN | SYSTOLIC BLOOD PRESSURE: 130 MMHG

## 2021-06-03 DIAGNOSIS — G89.29 CHRONIC MIDLINE LOW BACK PAIN WITH RIGHT-SIDED SCIATICA: Primary | ICD-10-CM

## 2021-06-03 DIAGNOSIS — M54.41 CHRONIC MIDLINE LOW BACK PAIN WITH RIGHT-SIDED SCIATICA: Primary | ICD-10-CM

## 2021-06-03 DIAGNOSIS — G89.29 CHRONIC RIGHT-SIDED LOW BACK PAIN WITH RIGHT-SIDED SCIATICA: Primary | ICD-10-CM

## 2021-06-03 DIAGNOSIS — Z00.00 HEALTH CARE MAINTENANCE: ICD-10-CM

## 2021-06-03 DIAGNOSIS — M54.41 CHRONIC RIGHT-SIDED LOW BACK PAIN WITH RIGHT-SIDED SCIATICA: Primary | ICD-10-CM

## 2021-06-03 DIAGNOSIS — I10 ESSENTIAL HYPERTENSION: ICD-10-CM

## 2021-06-03 PROCEDURE — 97530 THERAPEUTIC ACTIVITIES: CPT | Performed by: PHYSICAL THERAPIST

## 2021-06-03 PROCEDURE — 97162 PT EVAL MOD COMPLEX 30 MIN: CPT | Performed by: PHYSICAL THERAPIST

## 2021-06-03 PROCEDURE — 99215 OFFICE O/P EST HI 40 MIN: CPT | Performed by: STUDENT IN AN ORGANIZED HEALTH CARE EDUCATION/TRAINING PROGRAM

## 2021-06-03 PROCEDURE — 97110 THERAPEUTIC EXERCISES: CPT | Performed by: PHYSICAL THERAPIST

## 2021-06-03 PROCEDURE — 99417 PROLNG OP E/M EACH 15 MIN: CPT | Performed by: STUDENT IN AN ORGANIZED HEALTH CARE EDUCATION/TRAINING PROGRAM

## 2021-06-03 RX ORDER — AMOXICILLIN 500 MG
1200 CAPSULE ORAL DAILY
COMMUNITY
End: 2021-08-24 | Stop reason: HOSPADM

## 2021-06-03 RX ORDER — ASPIRIN 325 MG
325 TABLET ORAL DAILY
COMMUNITY
End: 2021-08-24 | Stop reason: HOSPADM

## 2021-06-03 RX ORDER — FAMOTIDINE 20 MG
2 TABLET ORAL DAILY
COMMUNITY
End: 2021-06-24 | Stop reason: HOSPADM

## 2021-06-03 NOTE — PROGRESS NOTES
Physical Therapy Initial Evaluation and Plan of Care      Patient: Areli Barnett   : 1960  Diagnosis/ICD-10 Code:  Chronic right-sided low back pain with right-sided sciatica [M54.41, G89.29]  Referring practitioner: Dawson Proctor MD    Subjective Evaluation    History of Present Illness  Date of onset: 6/3/2019  Mechanism of injury: Potentially related to a fall 2 years ago    Subjective comment: Started having low back pain about 2 years after sustaining a fall.  Started to get more pain in the back of the right hip and having pain along various areas of the right leg.  Has noticed quite a bit of improvement in the past few weeks in terms of right hip and leg pain.  Did fall again last week, and was using a crutch 3 days ago.  Feels good today and notes 0/10 pain.  Still can't exercise in the mornings, do yard work or walk more than a block.  Denies fever, chills and night sweats.  Patient Occupation: Not working Quality of life: good    Pain  Alleviating factors: Sitting; heat; anti-inflammatories; TENS unitl; Rx pain medication; mm relaxer.  Exacerbated by: walking >1 block; lying on right side (unable to sleep in that position)  Progression: improved    Patient Goals  Patient goals for therapy: decreased pain, increased motion and return to sport/leisure activities (return to morning workout routines (video instruction->mainly stretching); yard work; walking without pain)           *LEFS:  22/80    Objective          Neurological Testing     Reflexes   Left   Patellar (L4): trace (1+)  Achilles (S1): absent (0)  Clonus sign: negative    Right   Patellar (L4): absent (0)  Achilles (S1): absent (0)  Clonus sign: negative    Active Range of Motion     Lumbar   Flexion: 75 degrees   Extension: 10 (R anterior pain and pulling) degrees   Left lateral flexion: 45 degrees   Right lateral flexion: 25 (R anterior hip pain/pulling sensation) degrees     Passive Range of Motion     Additional Passive Range of  Motion Details  *PROM hip flx R/L: R->130 deg; L->120 deg (pulling in R anterior hip)  *PROM hip IR at 90 deg:  R->40 deg; L->35 deg    Strength/Myotome Testing     Left Knee   Extension: 5    Right Knee   Extension: 5    Left Ankle/Foot   Dorsiflexion: 5    Right Ankle/Foot   Dorsiflexion: 5    Additional Strength Details  *S/L hip aBd MMT:  Unable to test due to R LE pain    Tests     Additional Tests Details  (+) Slump->fully loaded position->pain along R LE femoral and fibular nn pathways          Assessment & Plan     Assessment  Impairments: abnormal or restricted ROM, lacks appropriate home exercise program and pain with function  Assessment details: Ms. Barnett is a 61 year old female that presents to physical therapy with a chronic history of low back pain and radiculopathic symptoms of the R LE.  PMH was covered during interview.  Neurological exam reveals areflexia of the R patellar tendon.  (+) slump testing for reproduction of R LE radiculopathic symptoms into the R L4 pathway.  Ambulates with decreased stance time/loading over the R LE.  Limited trunk mobility in coronal plane secondary to femoral nn tension.  Focus of care will be placed on improving proximal hip strength and neurodynamics of the R LE.  Prognosis: good  Functional Limitations: carrying objects, lifting, sleeping and walking  Goals  Plan Goals: STGs:  1.)  LEFS improved x 1 MCID in 4 weeks.  2.)  Self report at least 50% improvement in symptoms in 4 weeks.  LTGs:  1.)  Have 45 deg of AROM trunk SB B/L without pain in 6 weeks.  2.)  Transition to premorbid workout regimen and yard work without functional limitations in 6 weeks.    Plan  Therapy options: will be seen for skilled physical therapy services  Planned therapy interventions: manual therapy, stretching, strengthening, home exercise program, abdominal trunk stabilization and balance/weight-bearing training  Frequency: 1x week  Duration in visits: 6  Duration in weeks:  6  Treatment plan discussed with: patient        Manual Therapy:         mins  52275;  Therapeutic Exercise:    14     mins  08083;     Neuromuscular Kiko:        mins  89181;    Therapeutic Activity:     10     mins  54525;     Gait Training:           mins  36643;     Ultrasound:          mins  79848;    Electrical Stimulation:         mins  03872 ( );  Dry Needling          mins self-pay    Timed Treatment:   24   mins   Total Treatment:     55   mins    PT SIGNATURE: Marino Juares, PT   DATE TREATMENT INITIATED: 6/3/2021    Initial Certification  Certification Period: 9/1/2021  I certify that the therapy services are furnished while this patient is under my care.  The services outlined above are required by this patient, and will be reviewed every 90 days.     PHYSICIAN: Dawson Proctor MD      DATE:     Please sign and return via fax to [phone].. Thank you, [facility] Physical Therapy.

## 2021-06-03 NOTE — PROGRESS NOTES
Chief Complaint  Areli Barnett is a 61 y.o. female presenting for pinched nerve (Rt hip   Establish Care).     Patient has a past medical history of allergic rhinitis, hyperlipidemia, hypertension, palpitations, GERD, esophageal stricture, Helicobacter pylori, anxiety, depression, osteoporosis, osteoarthritis including degenerative changes of the spine.    History of Present Illness  Patient is here to reestablish care, she is accompanied by a female friend. She used to see Dr. Ortega in our office in the past, most recently she followed with Dr. Proctor.  She states she wasn't happy with him, she states he did not want to prescribe her more than one tramadol daily.    Patient says she has been having lower back pain for about 2 years, like a belt across the lumbosacral area.  He also has noticed some radiating pain around her right hip/right anterior thigh.  She also states that she sometimes experiences some tingling of her hands and feet.  She saw Ortho Dr. Coe on 5/14/2021 who felt her symptoms are likely related to lumbar spondylosis with suspected radiculopathy.  He prescribed her meloxicam instead of Celebrex and also Medrol Dosepak.  He recommended to do 6-week course of physical therapy, and if her symptoms would not improve he recommended referral to spine doctor for further evaluation.  Patient states that the pain is now actually significantly improved.  She still has another question as to what to do.  She has been applying heat packs, taking Tylenol, and also meloxicam.  She also has TENS that has helped her.  She admits to taking her boyfriend's tramadol due to her previous PCP not prescribing her more than 1 tablet daily.  She has appointment today to start physical therapy in [place].  She is able to get around in her home, but does not go for walks, she feels that it makes her symptoms worse.  She is not able to quantify how far she is able to walk.    The following portions of the  "patient's history were reviewed and updated as appropriate: allergies, current medications, past family history, past medical history, past social history, past surgical history and problem list.    Objective  /72 (BP Location: Left arm, Patient Position: Sitting, Cuff Size: Adult)   Pulse 110   Temp 98.6 °F (37 °C) (Temporal)   Ht 152.4 cm (60\")   Wt 53.6 kg (118 lb 3.2 oz)   LMP  (LMP Unknown)   SpO2 96%   BMI 23.08 kg/m²     Physical Exam  Vitals reviewed.   Constitutional:       Appearance: Normal appearance.   HENT:      Head: Normocephalic and atraumatic.      Nose: No congestion.   Eyes:      Extraocular Movements: Extraocular movements intact.      Conjunctiva/sclera: Conjunctivae normal.   Cardiovascular:      Rate and Rhythm: Normal rate and regular rhythm.      Heart sounds: Normal heart sounds. No murmur heard.     Pulmonary:      Effort: Pulmonary effort is normal.      Breath sounds: Normal breath sounds.   Abdominal:      General: There is no distension.      Palpations: Abdomen is soft. There is no mass.      Tenderness: There is no abdominal tenderness.   Musculoskeletal:      Cervical back: Neck supple.      Right lower leg: No edema.      Left lower leg: No edema.      Comments: Raised leg negative to 90 degrees bilaterally, without any radiating symptoms.  Good and symmetrical strength in hips, knees, ankles bilaterally.  Able to walk on tiptoes and heels.  Trendelenburg negative bilaterally.  Hips: Good ROM.  No significant tenderness on internal or external rotation bilaterally.   Skin:     General: Skin is warm and dry.   Neurological:      Mental Status: She is alert and oriented to person, place, and time. Mental status is at baseline.      Motor: No weakness.      Gait: Gait normal.      Comments: Patient is not able to relax, not able to elicit patellar or Achilles reflexes.  However these reflexes were normal during most recent Ortho exam in May.   Psychiatric:         " Behavior: Behavior normal.         Thought Content: Thought content normal.         Assessment/Plan   1. Chronic midline low back pain with right-sided sciatica  He has already been seen by Ortho, and they have concluded possible spondylosis with radiculopathy.  They also recommended referral to a spine surgeon if no improvement after 6 weeks of physical therapy.  Currently patient is reporting improvement.  I have encouraged her to continue on her current medications, but I do not recommend her continuing on tramadol.  For one she is reporting improvement now, but the more concerning part is that this is an addictive medication and she has also been taking her boyfriend's tramadol which puts her at increased risk for addiction.  I have encouraged her to do lighter activities as tolerated, also focus on physical therapy and exercises that she can use to improve her symptoms.  She can continue on TENS if that helps her, she can also continue using heat packs.  From my point of view I'm not convinced this is radiculopathy, but it still possible, could also be spinal stenosis related to degenerative spine disease.  In any case there is no red flags at this time, would continue current treatment plan and have her come back in 3 weeks for reevaluation.  If this would continue to get worse I would refer her to spine surgeon.    2. Essential hypertension  BP Readings from Last 3 Encounters:   06/03/21 130/72   05/14/21 141/69   05/10/21 158/80   Blood pressure currently at goal.  Continue on current regimen.    3. Health care maintenance  Up-to-date on colonoscopy and mammogram.  She had both her COVID-19 vaccines, the last one is not documented in her chart.    Total time spent on chart review, charting and face-to-face with patient 76 minutes.    Return in about 3 weeks (around 6/24/2021) for Recheck.    Future Appointments       Provider Department Center    6/3/2021 1:00 PM Marino Juares, PT [facility]  PHYSICAL THERAPY [facility]    6/24/2021 11:45 AM Salvatore Townsend MD [facility] INTERNAL MEDICINE [facility]    8/3/2021 11:30 AM Dawson Proctor MD [facility] PRIMARY CARE [facility]    8/10/2021 10:30 AM Yakelin Durbin [facility]            Salvatore Townsend MD  Family Medicine  06/03/2021    Note: Speech recognition transcription software may have been used to create portions of this document.  An attempt at proofreading has been made but errors in transcription could still be present.

## 2021-06-08 ENCOUNTER — TREATMENT (OUTPATIENT)
Dept: PHYSICAL THERAPY | Facility: CLINIC | Age: 61
End: 2021-06-08

## 2021-06-08 DIAGNOSIS — M54.41 CHRONIC MIDLINE LOW BACK PAIN WITH RIGHT-SIDED SCIATICA: Primary | ICD-10-CM

## 2021-06-08 DIAGNOSIS — G89.29 CHRONIC MIDLINE LOW BACK PAIN WITH RIGHT-SIDED SCIATICA: Primary | ICD-10-CM

## 2021-06-08 PROCEDURE — 97112 NEUROMUSCULAR REEDUCATION: CPT | Performed by: PHYSICAL THERAPIST

## 2021-06-08 PROCEDURE — 97140 MANUAL THERAPY 1/> REGIONS: CPT | Performed by: PHYSICAL THERAPIST

## 2021-06-08 PROCEDURE — 97110 THERAPEUTIC EXERCISES: CPT | Performed by: PHYSICAL THERAPIST

## 2021-06-08 NOTE — PROGRESS NOTES
Physical Therapy Daily Progress Note    Patient: Areli Barnett   : 1960  Diagnosis/ICD-10 Code:  Chronic midline low back pain with right-sided sciatica [M54.41, G89.29]  Referring practitioner: Dawson Proctor MD  Date of Initial Visit: Type: THERAPY  Noted: 6/3/2021  Today's Date: 2021  Patient seen for 2 sessions         Areli Barnett reports that she had soreness in her calf muscle after last visit.  Feels most symptoms along the back of the lower and outer lower leg with walking and standing.  Also, feels tension and soreness along the front of the right hip when lying flat.  Sleeping well at night due to use of medication to aid in sleep.        Subjective     Objective   See Exercise, Manual, and Modality Logs for complete treatment.       Assessment/Plan  Improvement noticed in ambulation quality in terms of loading the R LE more since last visit.  Also, has improved tolerance to loading the R hip against gravity with band resistance.  Added bridging of the hips to HEP.  Will focus on trunk extension loading at next visit.    *Updated HEP with written and verbal instruction (included in total time billed as TE below).       Manual Therapy:    12     mins  35828;  Therapeutic Exercise:    19     mins  81372;     Neuromuscular Kiko:  8      mins  63286;    Therapeutic Activity:          mins  77991;     Gait Training:           mins  19154;     Ultrasound:          mins  10613;    Electrical Stimulation:         mins  05315 ( );  Dry Needling          mins self-pay    Timed Treatment:   39   mins   Total Treatment:     39   mins    Marino Juares PT  Physical Therapist

## 2021-06-15 ENCOUNTER — TREATMENT (OUTPATIENT)
Dept: PHYSICAL THERAPY | Facility: CLINIC | Age: 61
End: 2021-06-15

## 2021-06-15 DIAGNOSIS — G89.29 CHRONIC MIDLINE LOW BACK PAIN WITH RIGHT-SIDED SCIATICA: Primary | ICD-10-CM

## 2021-06-15 DIAGNOSIS — M54.41 CHRONIC MIDLINE LOW BACK PAIN WITH RIGHT-SIDED SCIATICA: Primary | ICD-10-CM

## 2021-06-15 PROCEDURE — 97530 THERAPEUTIC ACTIVITIES: CPT | Performed by: PHYSICAL THERAPIST

## 2021-06-15 PROCEDURE — 97110 THERAPEUTIC EXERCISES: CPT | Performed by: PHYSICAL THERAPIST

## 2021-06-15 PROCEDURE — 97140 MANUAL THERAPY 1/> REGIONS: CPT | Performed by: PHYSICAL THERAPIST

## 2021-06-15 NOTE — PROGRESS NOTES
Physical Therapy Daily Progress Note    Patient: Areli Barnett   : 1960  Diagnosis/ICD-10 Code:  Chronic midline low back pain with right-sided sciatica [M54.41, G89.29]  Referring practitioner: Dawson Proctor MD  Date of Initial Visit: Type: THERAPY  Noted: 6/3/2021  Today's Date: 6/15/2021  Patient seen for 3 sessions         Areli Barnett reports that she has less intensity and frequency of tingling in the right foot since last visit.  Does have moments where she feels her symptoms are flared back up.  Overall, reports a little bit better.      Subjective     Objective   See Exercise, Manual, and Modality Logs for complete treatment.       Assessment/Plan  Focused visit on improving neural tension of the posterior lower leg structures.  Combined with improving loading tolerance of the R hip and trunk loading against gravity in the transverse plane.  Educated patient in modification of activities as needed during participation in ADLs/iADLs to reduce excessive loading of the L/S and R hip (billed as TA).     Updated HEP with written and verbal instruction (included in total time billed as TE below).         Manual Therapy:    8     mins  14162;  Therapeutic Exercise:    15     mins  42941;     Neuromuscular Kiko:        mins  15253;    Therapeutic Activity:     15     mins  40732;     Gait Training:           mins  23750;     Ultrasound:          mins  41676;    Electrical Stimulation:         mins  22071 ( );  Dry Needling          mins self-pay    Timed Treatment:   38   mins   Total Treatment:     38   mins    Marino Juares PT  Physical Therapist

## 2021-06-22 ENCOUNTER — TREATMENT (OUTPATIENT)
Dept: PHYSICAL THERAPY | Facility: CLINIC | Age: 61
End: 2021-06-22

## 2021-06-22 DIAGNOSIS — G89.29 CHRONIC MIDLINE LOW BACK PAIN WITH RIGHT-SIDED SCIATICA: Primary | ICD-10-CM

## 2021-06-22 DIAGNOSIS — M54.41 CHRONIC MIDLINE LOW BACK PAIN WITH RIGHT-SIDED SCIATICA: Primary | ICD-10-CM

## 2021-06-22 PROCEDURE — 97530 THERAPEUTIC ACTIVITIES: CPT | Performed by: PHYSICAL THERAPIST

## 2021-06-22 PROCEDURE — 97110 THERAPEUTIC EXERCISES: CPT | Performed by: PHYSICAL THERAPIST

## 2021-06-22 NOTE — PROGRESS NOTES
Physical Therapy Daily Progress Note    Patient: Areli Barnett   : 1960  Diagnosis/ICD-10 Code:  Chronic midline low back pain with right-sided sciatica [M54.41, G89.29]  Referring practitioner: Dawson Proctor MD  Date of Initial Visit: Type: THERAPY  Noted: 6/3/2021  Today's Date: 2021  Patient seen for 4 sessions         Areli Barnett reports that her low back and right leg symptoms are less intense and constant since last visit.  Has less pulling along the front of the thigh.  Feels tingling along the bottom of the foot near the arch region.  Also, has some aching that travels along the anterior lower leg.  Calf pain is mostly gone.    Subjective     Objective   See Exercise, Manual, and Modality Logs for complete treatment.       Assessment/Plan  Focused visit on improving loading tolerance of the L/S into extension and standing/weight bearing.      Updated HEP with written and verbal instruction (this time included in total time billed below in  TE).    Instructed patient to pick a day where she works in her garden.  Another day doing house work.  And, another day where she walks her dog for 1/4 mile.  (all billed as TA below).           Manual Therapy:         mins  16971;  Therapeutic Exercise:    18     mins  52084;     Neuromuscular Kiko:        mins  42425;    Therapeutic Activity:     8     mins  51030;     Gait Training:           mins  42489;     Ultrasound:          mins  33817;    Electrical Stimulation:         mins  93307 ( );  Dry Needling          mins self-pay    Timed Treatment:   26   mins   Total Treatment:     26   mins    Marino Juares PT  Physical Therapist

## 2021-06-24 ENCOUNTER — OFFICE VISIT (OUTPATIENT)
Dept: INTERNAL MEDICINE | Facility: CLINIC | Age: 61
End: 2021-06-24

## 2021-06-24 VITALS
WEIGHT: 116.2 LBS | TEMPERATURE: 98.2 F | HEIGHT: 60 IN | HEART RATE: 67 BPM | DIASTOLIC BLOOD PRESSURE: 92 MMHG | OXYGEN SATURATION: 98 % | BODY MASS INDEX: 22.81 KG/M2 | SYSTOLIC BLOOD PRESSURE: 146 MMHG

## 2021-06-24 DIAGNOSIS — F32.1 CURRENT MODERATE EPISODE OF MAJOR DEPRESSIVE DISORDER, UNSPECIFIED WHETHER RECURRENT (HCC): ICD-10-CM

## 2021-06-24 DIAGNOSIS — J30.1 SEASONAL ALLERGIC RHINITIS DUE TO POLLEN: ICD-10-CM

## 2021-06-24 DIAGNOSIS — I10 ESSENTIAL HYPERTENSION: ICD-10-CM

## 2021-06-24 DIAGNOSIS — M47.816 LUMBAR SPONDYLOSIS: Primary | ICD-10-CM

## 2021-06-24 DIAGNOSIS — H10.13 ALLERGIC CONJUNCTIVITIS OF BOTH EYES: ICD-10-CM

## 2021-06-24 PROCEDURE — 99214 OFFICE O/P EST MOD 30 MIN: CPT | Performed by: STUDENT IN AN ORGANIZED HEALTH CARE EDUCATION/TRAINING PROGRAM

## 2021-06-24 RX ORDER — BUPROPION HYDROCHLORIDE 150 MG/1
TABLET ORAL
Qty: 90 TABLET | Refills: 3 | COMMUNITY
Start: 2021-06-24 | End: 2021-08-03

## 2021-06-24 RX ORDER — OLOPATADINE HYDROCHLORIDE 1 MG/ML
1 SOLUTION/ DROPS OPHTHALMIC 2 TIMES DAILY
Qty: 5 ML | Refills: 5 | Status: SHIPPED | OUTPATIENT
Start: 2021-06-24 | End: 2022-07-05

## 2021-06-24 RX ORDER — CELECOXIB 200 MG/1
200 CAPSULE ORAL DAILY
COMMUNITY
Start: 2021-06-21 | End: 2021-06-24

## 2021-06-24 RX ORDER — CETIRIZINE HYDROCHLORIDE 10 MG/1
10 TABLET ORAL NIGHTLY
Qty: 30 TABLET | Refills: 5 | Status: SHIPPED | OUTPATIENT
Start: 2021-06-24 | End: 2022-01-18

## 2021-06-24 RX ORDER — MELOXICAM 15 MG/1
TABLET ORAL
Qty: 90 TABLET | Refills: 1 | Status: SHIPPED | OUTPATIENT
Start: 2021-06-24 | End: 2022-06-01

## 2021-06-24 RX ORDER — FLUTICASONE PROPIONATE 50 MCG
1 SPRAY, SUSPENSION (ML) NASAL 2 TIMES DAILY
Qty: 48 G | Refills: 3 | Status: SHIPPED | OUTPATIENT
Start: 2021-06-24 | End: 2023-03-31

## 2021-06-24 RX ORDER — AMLODIPINE BESYLATE 5 MG/1
5 TABLET ORAL DAILY
Qty: 30 TABLET | Refills: 3 | Status: SHIPPED | OUTPATIENT
Start: 2021-06-24 | End: 2021-08-03

## 2021-06-24 NOTE — PROGRESS NOTES
"Chief Complaint  Areli Barnett is a 61 y.o. female presenting for Back Pain (low  f/u) and Med Refill (eye drops).     Patient has a past medical history of allergic rhinitis, hyperlipidemia, hypertension, palpitations, GERD, esophageal stricture, Helicobacter pylori, anxiety, depression, osteoporosis, osteoarthritis including degenerative changes of the spine.    History of Present Illness  Patient is here for follow-up of her back pain.  Since her last visit she has been doing physical therapy and been more active, she states her symptoms have improved significantly.  She still taking NSAIDs going between Celebrex and meloxicam, she feels meloxicam is better and would like to continue on it.  She did have a history of gastric ulcer, but it appears she is tolerating current medication and would like to continue.    He is also having worsening of her allergies, she is on Flonase and cetirizine, she is also asking for something for her eyes.  Her symptoms are typically worse in the spring and early summer.    For her depression she has been on Prozac for 20 years, she is also been on Wellbutrin 150 mg.  She feels that Wellbutrin has been sexual side effects, with increased drive and for this reason she has decreased the medication to every other day.    The following portions of the patient's history were reviewed and updated as appropriate: allergies, current medications, past family history, past medical history, past social history, past surgical history and problem list.    Objective  /92 (BP Location: Left arm, Patient Position: Sitting, Cuff Size: Adult)   Pulse 67   Temp 98.2 °F (36.8 °C) (Temporal)   Ht 152.4 cm (60\")   Wt 52.7 kg (116 lb 3.2 oz)   LMP  (LMP Unknown)   SpO2 98%   BMI 22.69 kg/m²     Physical Exam  Constitutional:       General: She is not in acute distress.     Appearance: Normal appearance. She is not ill-appearing.   HENT:      Head: Normocephalic and atraumatic.      Nose: " Congestion present.   Eyes:      Extraocular Movements: Extraocular movements intact.      Comments: Mild bilateral conjunctival redness.   Musculoskeletal:      Right lower leg: No edema.      Left lower leg: No edema.   Neurological:      Mental Status: She is alert and oriented to person, place, and time. Mental status is at baseline.      Motor: No weakness.      Gait: Gait normal.   Psychiatric:         Mood and Affect: Mood normal.         Behavior: Behavior normal.         Thought Content: Thought content normal.         Assessment/Plan   1. Lumbar spondylosis  Symptoms are improving.  Encouraged continued physical activity and PT.  - meloxicam (MOBIC) 15 MG tablet; 1 PO Daily with food.  Dispense: 90 tablet; Refill: 1    2. Allergic conjunctivitis of both eyes  We will add eyedrops to her regimen.  - olopatadine (Patanol) 0.1 % ophthalmic solution; Administer 1 drop to both eyes 2 (Two) Times a Day.  Dispense: 5 mL; Refill: 5    3. Seasonal allergic rhinitis due to pollen  Continue on Flonase and cetirizine.  - fluticasone (FLONASE) 50 MCG/ACT nasal spray; 1 spray into the nostril(s) as directed by provider 2 (two) times a day. SHAKE LIQUID AND INSTILL 2 SPRAYS IN EACH NOSTRIL EVERY DAY  Dispense: 48 g; Refill: 3  - cetirizine (zyrTEC) 10 MG tablet; Take 1 tablet by mouth Every Night.  Dispense: 30 tablet; Refill: 5    4. Essential hypertension  BP Readings from Last 3 Encounters:   06/24/21 146/92   06/03/21 130/72   05/14/21 141/69   Her blood pressure is elevated, meeting criteria for hypertension.  She was ordered for metoprolol in the past, but never started on that.  Does not have the issues with palpitations or any heart condition.  I would recommend to start amlodipine.  Counseled on side effects, including lower extremity swelling.  Patient will send me a [employer] message in 2 weeks, she has a home blood pressure monitor to check.  If needed will manage adjustment, otherwise she will return in 2  months for follow-up.  - amLODIPine (NORVASC) 5 MG tablet; Take 1 tablet by mouth Daily.  Dispense: 30 tablet; Refill: 3    5. Current moderate episode of major depressive disorder, unspecified whether recurrent (CMS/HCC)  Overall stable.  We will continue on current regimen.  - buPROPion XL (WELLBUTRIN XL) 150 MG 24 hr tablet; Takes it on Sat-Mon-Tues-Thur  Dispense: 90 tablet; Refill: 3    Total time spent on chart review, charting and face-to-face with patient 38 minutes.    Return in about 2 months (around 8/24/2021).    Future Appointments       Provider Department Center    7/8/2021 10:00 AM Marino Juares PT [facility] PHYSICAL THERAPY [facility]    8/3/2021 11:30 AM Dawson Proctor MD [facility] PRIMARY CARE [facility]    8/10/2021 10:30 AM Yakelin Durbin [facility] GENETIC COUNSELING CENTER [facility]    8/24/2021 12:45 PM Salvatore Townsend MD [facility] INTERNAL MEDICINE [facility]          Salvatore Townsend MD  Family Medicine  06/24/2021    Note: Speech recognition transcription software may have been used to create portions of this document.  An attempt at proofreading has been made but errors in transcription could still be present.

## 2021-07-13 ENCOUNTER — TREATMENT (OUTPATIENT)
Dept: PHYSICAL THERAPY | Facility: CLINIC | Age: 61
End: 2021-07-13

## 2021-07-13 DIAGNOSIS — M54.41 CHRONIC BILATERAL LOW BACK PAIN WITH RIGHT-SIDED SCIATICA: Primary | ICD-10-CM

## 2021-07-13 DIAGNOSIS — G89.29 CHRONIC BILATERAL LOW BACK PAIN WITH RIGHT-SIDED SCIATICA: Primary | ICD-10-CM

## 2021-07-13 PROCEDURE — 97530 THERAPEUTIC ACTIVITIES: CPT | Performed by: PHYSICAL THERAPIST

## 2021-07-13 PROCEDURE — 97110 THERAPEUTIC EXERCISES: CPT | Performed by: PHYSICAL THERAPIST

## 2021-07-14 NOTE — PROGRESS NOTES
Physical Therapy Daily Progress Note    Patient: Areli Barnett   : 1960  Diagnosis/ICD-10 Code:  Chronic bilateral low back pain with right-sided sciatica [M54.41, G89.29]  Referring practitioner: Dawson Proctor MD  Date of Initial Visit: Type: THERAPY  Noted: 6/3/2021  Today's Date: 2021  Patient seen for 5 sessions         Areli Barnett reports that she no longer has any pain, numbness or tingling in the lower leg.  Still has soreness along the front of the thigh and side of the right hip.  Sleep is better now.  No longer using medication to control pain.  Still fearful of twisting and bending.  But, has been walking much more in the past 2 weeks.    Aggs:  *sitting in hard chair >1 hour  *lifting dog food  *gardening        Objective   See Exercise, Manual, and Modality Logs for complete treatment.     *LEFS (): 22/80 ; LEFS (21):  50  (MCID improvement x 2)  *Trunk flx/ext/R SB/L SB:  80 deg (slow to return to upright position)/40 deg/50 deg/55 deg  *(-) p! With overpressure into hip flx  *Hip flx/aBd MMT:  5/5; 5/5  *(+) posterior thigh pulling with overpressure SLR at 65 deg    Assessment/Plan  Ms. Barnett has attended physical therapy for a total of 5 visits for chronic low back pain with associated R LE radiculopathic symptoms.  Trunk mobility has improved in all planes.  Has excellent R hip power output without pain.  Still needs focused care on reducing fear avoidant behavior as it relates to loading the trunk in the transverse and sagittal planes.           Manual Therapy:         mins  68826;  Therapeutic Exercise:    15     mins  09547;     Neuromuscular Kiko:        mins  35645;    Therapeutic Activity:     12     mins  52206;     Gait Training:           mins  46553;     Ultrasound:          mins  83686;    Electrical Stimulation:         mins  68937 ( );  Dry Needling          mins self-pay    Timed Treatment:   27   mins   Total Treatment:     33    mins    Marino Juares, PT  Physical Therapist

## 2021-07-29 ENCOUNTER — TREATMENT (OUTPATIENT)
Dept: PHYSICAL THERAPY | Facility: CLINIC | Age: 61
End: 2021-07-29

## 2021-07-29 DIAGNOSIS — M54.50 CHRONIC RIGHT-SIDED LOW BACK PAIN WITHOUT SCIATICA: Primary | ICD-10-CM

## 2021-07-29 DIAGNOSIS — G89.29 CHRONIC RIGHT-SIDED LOW BACK PAIN WITHOUT SCIATICA: Primary | ICD-10-CM

## 2021-07-29 PROCEDURE — 97140 MANUAL THERAPY 1/> REGIONS: CPT | Performed by: PHYSICAL THERAPIST

## 2021-07-29 PROCEDURE — 97530 THERAPEUTIC ACTIVITIES: CPT | Performed by: PHYSICAL THERAPIST

## 2021-07-29 NOTE — PROGRESS NOTES
Physical Therapy Daily Progress Note    Patient: Areli Barnett   : 1960  Diagnosis/ICD-10 Code:  Chronic right-sided low back pain without sciatica [M54.5, G89.29]  Referring practitioner: Dawson Proctor MD  Date of Initial Visit: Type: THERAPY  Noted: 6/3/2021  Today's Date: 2021  Patient seen for 6 sessions         Areli Barnett reports that she has had catching in her right low back region for the past day or so.  Notices it when changing positions, but has no particular pattern.  Still having difficulty sitting in a hard chair and is unable to lie on right side due to right hip pain.  Denies numbness and tingling in the right leg and foot.  Wants to be able to carry dog food over her shoulder and walk with it.    Objective   See Exercise, Manual, and Modality Logs for complete treatment.       Assessment/Plan  Focused visit on improving sagittal and frontal plane mobility today.  Has more stiffness and soreness in WBing, therefore, performed all movement patterns when lying down.  Will focus on WBing movement patterns if R lower L/S quadrant is less stiff and sore at next visit.           Manual Therapy:    8     mins  68763;  Therapeutic Exercise:         mins  44437;     Neuromuscular Kiko:        mins  09175;    Therapeutic Activity:     18     mins  57646;     Gait Training:           mins  83506;     Ultrasound:          mins  55136;    Electrical Stimulation:         mins  26161 ( );  Dry Needling          mins self-pay    Timed Treatment:   26   mins   Total Treatment:     30   mins    Marino Juares PT  Physical Therapist

## 2021-08-03 ENCOUNTER — OFFICE VISIT (OUTPATIENT)
Dept: INTERNAL MEDICINE | Facility: CLINIC | Age: 61
End: 2021-08-03

## 2021-08-03 VITALS
DIASTOLIC BLOOD PRESSURE: 70 MMHG | SYSTOLIC BLOOD PRESSURE: 150 MMHG | WEIGHT: 116 LBS | BODY MASS INDEX: 22.78 KG/M2 | HEIGHT: 60 IN | OXYGEN SATURATION: 99 % | HEART RATE: 68 BPM | TEMPERATURE: 96.9 F

## 2021-08-03 DIAGNOSIS — K21.00 GASTROESOPHAGEAL REFLUX DISEASE WITH ESOPHAGITIS WITHOUT HEMORRHAGE: ICD-10-CM

## 2021-08-03 DIAGNOSIS — G47.09 OTHER INSOMNIA: ICD-10-CM

## 2021-08-03 DIAGNOSIS — I10 ESSENTIAL HYPERTENSION: ICD-10-CM

## 2021-08-03 DIAGNOSIS — E78.49 OTHER HYPERLIPIDEMIA: Primary | ICD-10-CM

## 2021-08-03 PROCEDURE — 99214 OFFICE O/P EST MOD 30 MIN: CPT | Performed by: INTERNAL MEDICINE

## 2021-08-03 RX ORDER — FAMOTIDINE 40 MG/1
40 TABLET, FILM COATED ORAL DAILY PRN
Qty: 30 TABLET | Refills: 2 | Status: SHIPPED | OUTPATIENT
Start: 2021-08-03 | End: 2021-12-08

## 2021-08-03 NOTE — PROGRESS NOTES
"Patient is a 61 y.o. female who is here for a follow up of anxiety and depression.  Chief Complaint   Patient presents with   • Anxiety   • Depression         HPI:    Here for mgmt of GERD and hyperlipidemia and depression.  Sleeping well.  Back pain is better overall.  GERD is controlled with meds.  No nausea or emesis.  Hip pain is better.  Some dyspepsia.  No falls.     History:     Patient Active Problem List   Diagnosis   • Allergic rhinitis   • Anxiety state   • Other hyperlipidemia   • Osteoporosis   • Gastroesophageal reflux disease with esophagitis   • Other insomnia   • Depressed   • Nausea   • Dysphagia   • Heartburn   • Periumbilical abdominal pain   • Osteoarthritis   • Chronic superficial gastritis without bleeding   • Ganglion cyst of wrist, left   • Esophageal stricture   • Gastric ulcer without hemorrhage or perforation   • Inlet patch of esophagus   • Helicobacter pylori infection   • Paresthesia of both hands   • Ulnar neuropathy at elbow   • Right hip pain   • Pain in joint involving multiple sites   • Palpitations   • Essential hypertension       Past Medical History:   Diagnosis Date   • Anemia    • Arthritis    • Body piercing     EARS   • Depression    • Dysphagia     REPORTS SOMETIMES SHE HAS PAIN WHEN SWALLOWING FOOD   • Elevated cholesterol    • Elevated LFTs    • Epigastric pain    • Exposure to TB     REPORTS MANY YEARS AGO. REPORTS TB TESTING HAS ALWAYS BEEN NEGATIVE.    • GERD (gastroesophageal reflux disease)    • History of bronchitis 01/2019   • History of chest pain     REPORTS FALL 2018 AND THAT SHE HAD TESTING THAT WAS WNL'S. REPORTS SHE IS NOW BEING CHECKED FOR GI.    • History of exercise stress test     2018 - REPORTS WAS TOLD ALL WNL'S    • History of fracture     TOE   • Hyperlipidemia    • Latex allergy    • Murmur     REPORTS \"A SLIGHT MURMUR\"   • Osteoporosis    • Plantar fasciitis    • Seasonal allergies    • Vertigo    • Wears glasses     PRN       Past Surgical " History:   Procedure Laterality Date   • ABDOMINOPLASTY  2003   • AUGMENTATION MAMMAPLASTY Bilateral    • COLONOSCOPY  02/02/2016   • ENDOSCOPY N/A 2/12/2019    Procedure: ESOPHAGOGASTRODUODENOSCOPY with cold biopsy and esophageal dilation;  Surgeon: Brenden Steward MD;  Location: [facility] ENDOSCOPY;  Service: Gastroenterology   • ENDOSCOPY N/A 6/11/2019    Procedure: ESOPHAGOGASTRODUODENOSCOPY W/ COLD FORCEP BIOPSIES;  Surgeon: Brenden Steward MD;  Location: [facility] ENDOSCOPY;  Service: Gastroenterology   • UPPER GASTROINTESTINAL ENDOSCOPY  02/12/2019   • WISDOM TOOTH EXTRACTION         Current Outpatient Medications on File Prior to Visit   Medication Sig   • acetaminophen (Tylenol) 325 MG tablet Take 2 tablets by mouth Every 6 (Six) Hours.   • alendronate (FOSAMAX) 70 MG tablet TAKE ONE TABLET BY MOUTH EVERY 7 DAYS   • aspirin 325 MG tablet Take 325 mg by mouth Daily.   • atorvastatin (LIPITOR) 20 MG tablet Take 1 tablet by mouth every night at bedtime.   • cetirizine (zyrTEC) 10 MG tablet Take 1 tablet by mouth Every Night.   • clotrimazole-betamethasone (LOTRISONE) 1-0.05 % cream Apply  topically to the appropriate area as directed 2 (Two) Times a Day. (Patient taking differently: Apply  topically to the appropriate area as directed 2 (Two) Times a Day. PRN)   • cyclobenzaprine (FLEXERIL) 10 MG tablet TAKE ONE TABLET BY MOUTH THREE TIMES A DAY AS NEEDED FOR MUSCLE SPASMS. DO NOT DRIVE AFTER TAKING A DOSE   • FLUoxetine (PROzac) 20 MG capsule TAKE TWO CAPSULES BY MOUTH EVERY DAY   • fluticasone (FLONASE) 50 MCG/ACT nasal spray 1 spray into the nostril(s) as directed by provider 2 (two) times a day. SHAKE LIQUID AND INSTILL 2 SPRAYS IN EACH NOSTRIL EVERY DAY   • Magnesium 250 MG tablet Take 250 mg by mouth Daily.   • meloxicam (MOBIC) 15 MG tablet 1 PO Daily with food.   • Nutritional Supplements (NUTRITIONAL SUPPLEMENT PO) Take  by mouth. Hemp Extract  1000 mg  .25 ml   BID   • olopatadine (Patanol) 0.1 %  ophthalmic solution Administer 1 drop to both eyes 2 (Two) Times a Day.   • Omega-3 Fatty Acids (fish oil) 1200 MG capsule capsule Take 1,200 mg by mouth Daily.   • ondansetron ODT (ZOFRAN-ODT) 4 MG disintegrating tablet Take 1 tablet by mouth Every 8 (Eight) Hours As Needed for Nausea or Vomiting.   • pantoprazole (PROTONIX) 40 MG EC tablet TAKE 1 TABLET BY MOUTH DAILY EVERY MORNING 30 MINUTES BEFORE BREAKFAST. Call for follow up appt for refills.   • traMADol (ULTRAM) 50 MG tablet TAKE 1 TABLET BY MOUTH DAILY AS NEEDED FOR MODERATE PAIN .   • traZODone (DESYREL) 50 MG tablet Take 1 tablet by mouth At Night As Needed for Sleep. for sleep (Patient taking differently: Take 50 mg by mouth Every Night. 1-2 nightly for sleep)   • tretinoin (Retin-A) 0.05 % cream Apply  topically to the appropriate area as directed At Night As Needed (acne).   • [DISCONTINUED] amLODIPine (NORVASC) 5 MG tablet Take 1 tablet by mouth Daily.   • [DISCONTINUED] buPROPion XL (WELLBUTRIN XL) 150 MG 24 hr tablet Takes it on Sat-Mon-Tues-Thur     No current facility-administered medications on file prior to visit.       Family History   Problem Relation Age of Onset   • Breast cancer Maternal Aunt    • Lung cancer Mother    • Cancer Mother    • Heart attack Father    • Crohn's disease Sister    • Breast cancer Sister    • Cancer Sister    • Stomach cancer Paternal Grandfather    • Alcohol abuse Paternal Grandfather    • Colon cancer Neg Hx    • Cirrhosis Neg Hx    • Liver disease Neg Hx    • Liver cancer Neg Hx    • Ulcerative colitis Neg Hx    • Esophageal cancer Neg Hx        Social History     Socioeconomic History   • Marital status: Single     Spouse name: Not on file   • Number of children: Not on file   • Years of education: Not on file   • Highest education level: Not on file   Tobacco Use   • Smoking status: Former Smoker     Packs/day: 0.25     Years: 5.00     Pack years: 1.25     Types: Cigarettes     Quit date: 2/4/2019     Years  "since quittin.4   • Smokeless tobacco: Never Used   Vaping Use   • Vaping Use: Never used   Substance and Sexual Activity   • Alcohol use: Not Currently     Comment: SOCIAL USE, NO HISTORY OF ABUSE REPORTED   • Drug use: Yes     Types: Marijuana   • Sexual activity: Defer         Review of Systems   Constitutional: Negative for chills, fatigue, fever and unexpected weight change.   HENT: Negative for congestion, ear pain, hearing loss, rhinorrhea, sinus pressure, sore throat and trouble swallowing.    Eyes: Negative for discharge and itching.   Respiratory: Negative for cough, chest tightness and shortness of breath.    Cardiovascular: Negative for chest pain and leg swelling.        Summer 2018 cardiac w/u neg   Gastrointestinal: Negative for abdominal pain, blood in stool, constipation, diarrhea and vomiting.         colonoscopy normal   colonoscopy normal   Endocrine: Negative for polydipsia and polyuria.   Genitourinary: Negative for difficulty urinating, dysuria, enuresis, frequency, hematuria and urgency.           Musculoskeletal: Positive for arthralgias and back pain. Negative for gait problem and joint swelling.        See HPI   Skin: Negative for rash and wound.   Allergic/Immunologic: Positive for environmental allergies. Negative for immunocompromised state.   Neurological: Negative for dizziness, syncope, weakness, light-headedness, numbness and headaches.   Hematological: Does not bruise/bleed easily.   Psychiatric/Behavioral: Negative for behavioral problems, dysphoric mood and sleep disturbance. The patient is not nervous/anxious.        /70   Pulse 68   Temp 96.9 °F (36.1 °C) (Infrared)   Ht 152.4 cm (60\")   Wt 52.6 kg (116 lb)   LMP  (LMP Unknown)   SpO2 99%   BMI 22.65 kg/m²       Physical Exam  Constitutional:       Appearance: Normal appearance. She is well-developed.   HENT:      Head: Normocephalic and atraumatic.      Right Ear: External ear normal.      Left " Ear: External ear normal.      Nose: Nose normal.      Mouth/Throat:      Mouth: Mucous membranes are moist.      Pharynx: Oropharynx is clear.   Eyes:      Extraocular Movements: Extraocular movements intact.      Conjunctiva/sclera: Conjunctivae normal.      Pupils: Pupils are equal, round, and reactive to light.   Cardiovascular:      Rate and Rhythm: Normal rate and regular rhythm.      Heart sounds: Normal heart sounds.   Pulmonary:      Effort: Pulmonary effort is normal.      Breath sounds: Normal breath sounds.   Abdominal:      General: Bowel sounds are normal.      Palpations: Abdomen is soft.   Musculoskeletal:      Cervical back: Normal range of motion and neck supple.      Comments: Pain on flexion past 45 degrees   Lymphadenopathy:      Cervical: No cervical adenopathy.   Skin:     General: Skin is warm and dry.   Neurological:      General: No focal deficit present.      Mental Status: She is alert and oriented to person, place, and time.   Psychiatric:         Mood and Affect: Mood normal.         Behavior: Behavior normal.         Thought Content: Thought content normal.         Procedure:      Discussion/Summary:    GERD/gastritis-cont on protonix, add prn pepcid  Osteoporosis-cont weekly fosamax, stable  Hyperlipidemia-labs today on Lipitor, counseled on diet  Insomnia-cont trazodone, controlled  rhintis-cont zyrtec and cont flonase  DJD/low back pain-cont on celebrex prn, autoimmune w/u dw patient  Depression-on prozac/wellbutrin, controlled  Tachycardia/HTN-advised to restart amlodipine     8/3 Labs noted and dw patient, counseled on low carb/ncs    She is seeing a local MD in [place], no need to see 2 primary MDs, she was notified    Current Outpatient Medications:   •  acetaminophen (Tylenol) 325 MG tablet, Take 2 tablets by mouth Every 6 (Six) Hours., Disp: , Rfl:   •  alendronate (FOSAMAX) 70 MG tablet, TAKE ONE TABLET BY MOUTH EVERY 7 DAYS, Disp: 12 tablet, Rfl: 3  •  aspirin 325 MG  tablet, Take 325 mg by mouth Daily., Disp: , Rfl:   •  atorvastatin (LIPITOR) 20 MG tablet, Take 1 tablet by mouth every night at bedtime., Disp: 30 tablet, Rfl: 11  •  cetirizine (zyrTEC) 10 MG tablet, Take 1 tablet by mouth Every Night., Disp: 30 tablet, Rfl: 5  •  clotrimazole-betamethasone (LOTRISONE) 1-0.05 % cream, Apply  topically to the appropriate area as directed 2 (Two) Times a Day. (Patient taking differently: Apply  topically to the appropriate area as directed 2 (Two) Times a Day. PRN), Disp: 15 g, Rfl: 0  •  cyclobenzaprine (FLEXERIL) 10 MG tablet, TAKE ONE TABLET BY MOUTH THREE TIMES A DAY AS NEEDED FOR MUSCLE SPASMS. DO NOT DRIVE AFTER TAKING A DOSE, Disp: 30 tablet, Rfl: 2  •  FLUoxetine (PROzac) 20 MG capsule, TAKE TWO CAPSULES BY MOUTH EVERY DAY, Disp: 60 capsule, Rfl: 11  •  fluticasone (FLONASE) 50 MCG/ACT nasal spray, 1 spray into the nostril(s) as directed by provider 2 (two) times a day. SHAKE LIQUID AND INSTILL 2 SPRAYS IN EACH NOSTRIL EVERY DAY, Disp: 48 g, Rfl: 3  •  Magnesium 250 MG tablet, Take 250 mg by mouth Daily., Disp: , Rfl:   •  meloxicam (MOBIC) 15 MG tablet, 1 PO Daily with food., Disp: 90 tablet, Rfl: 1  •  Nutritional Supplements (NUTRITIONAL SUPPLEMENT PO), Take  by mouth. Hemp Extract  1000 mg  .25 ml   BID, Disp: , Rfl:   •  olopatadine (Patanol) 0.1 % ophthalmic solution, Administer 1 drop to both eyes 2 (Two) Times a Day., Disp: 5 mL, Rfl: 5  •  Omega-3 Fatty Acids (fish oil) 1200 MG capsule capsule, Take 1,200 mg by mouth Daily., Disp: , Rfl:   •  ondansetron ODT (ZOFRAN-ODT) 4 MG disintegrating tablet, Take 1 tablet by mouth Every 8 (Eight) Hours As Needed for Nausea or Vomiting., Disp: 30 tablet, Rfl: 1  •  pantoprazole (PROTONIX) 40 MG EC tablet, TAKE 1 TABLET BY MOUTH DAILY EVERY MORNING 30 MINUTES BEFORE BREAKFAST. Call for follow up appt for refills., Disp: 90 tablet, Rfl: 3  •  traMADol (ULTRAM) 50 MG tablet, TAKE 1 TABLET BY MOUTH DAILY AS NEEDED FOR MODERATE  PAIN ., Disp: 30 tablet, Rfl: 0  •  traZODone (DESYREL) 50 MG tablet, Take 1 tablet by mouth At Night As Needed for Sleep. for sleep (Patient taking differently: Take 50 mg by mouth Every Night. 1-2 nightly for sleep), Disp: 90 tablet, Rfl: 3  •  tretinoin (Retin-A) 0.05 % cream, Apply  topically to the appropriate area as directed At Night As Needed (acne)., Disp: 20 g, Rfl: 2  •  famotidine (PEPCID) 40 MG tablet, Take 1 tablet by mouth Daily As Needed for Indigestion or Heartburn., Disp: 30 tablet, Rfl: 2        Diagnoses and all orders for this visit:    1. Other hyperlipidemia (Primary)  -     Comprehensive Metabolic Panel  -     Lipid Panel    2. Essential hypertension  -     CBC (No Diff)    3. Gastroesophageal reflux disease with esophagitis without hemorrhage  -     famotidine (PEPCID) 40 MG tablet; Take 1 tablet by mouth Daily As Needed for Indigestion or Heartburn.  Dispense: 30 tablet; Refill: 2    4. Other insomnia

## 2021-08-10 ENCOUNTER — CLINICAL SUPPORT (OUTPATIENT)
Dept: GENETICS | Facility: HOSPITAL | Age: 61
End: 2021-08-10

## 2021-08-10 ENCOUNTER — TREATMENT (OUTPATIENT)
Dept: PHYSICAL THERAPY | Facility: CLINIC | Age: 61
End: 2021-08-10

## 2021-08-10 DIAGNOSIS — Z80.42 FAMILY HISTORY OF MALIGNANT NEOPLASM OF PROSTATE: ICD-10-CM

## 2021-08-10 DIAGNOSIS — G89.29 CHRONIC MIDLINE LOW BACK PAIN WITHOUT SCIATICA: Primary | ICD-10-CM

## 2021-08-10 DIAGNOSIS — Z13.79 GENETIC TESTING: Primary | ICD-10-CM

## 2021-08-10 DIAGNOSIS — Z80.3 FAMILY HISTORY OF MALIGNANT NEOPLASM OF BREAST: ICD-10-CM

## 2021-08-10 DIAGNOSIS — M54.50 CHRONIC MIDLINE LOW BACK PAIN WITHOUT SCIATICA: Primary | ICD-10-CM

## 2021-08-10 PROCEDURE — 97530 THERAPEUTIC ACTIVITIES: CPT | Performed by: PHYSICAL THERAPIST

## 2021-08-10 PROCEDURE — 97110 THERAPEUTIC EXERCISES: CPT | Performed by: PHYSICAL THERAPIST

## 2021-08-10 PROCEDURE — 97140 MANUAL THERAPY 1/> REGIONS: CPT | Performed by: PHYSICAL THERAPIST

## 2021-08-10 NOTE — PROGRESS NOTES
Physical Therapy Daily Progress Note    Patient: Areli Barnett   : 1960  Diagnosis/ICD-10 Code:  Chronic midline low back pain without sciatica [M54.5, G89.29]  Referring practitioner: Dawson Proctor MD  Date of Initial Visit: Type: THERAPY  Noted: 6/3/2021  Today's Date: 8/10/2021  Patient seen for 7 sessions         Areli Barnett reports that her low back is not catching any longer, but still feels stiff.  Is able to lie on her right side to sleep for a little while now.  Is walking about 1 mile per day on her property, and enjoys walking up hills.  Still has not been able to carry dog food bag over her shoulder.      Subjective     Objective   See Exercise, Manual, and Modality Logs for complete treatment.       Assessment/Plan  Continued emphasis on improving loading tolerance in full weight bearing with sagittal and transverse plane motions.  Still has end-range soreness with trunk rotation in standing (notably into L rot).      *Updated HEP with written and verbal instruction (included in total time in TE box below).         Manual Therapy:    10     mins  03148;  Therapeutic Exercise:    19     mins  17853;     Neuromuscular Kiko:        mins  46225;    Therapeutic Activity:     10     mins  27265;     Gait Training:           mins  50704;     Ultrasound:          mins  24166;    Electrical Stimulation:         mins  51747 ( );  Dry Needling          mins self-pay    Timed Treatment:   39   mins   Total Treatment:     39   mins    Marino Juares PT  Physical Therapist

## 2021-08-11 NOTE — PROGRESS NOTES
Areli Barnett, a 61-year-old female, was seen for genetic counseling due to a family history of breast cancer. Genetic counseling was completed via telehealth. Ms. Barnett has no personal history of cancer. She was 14 years old at menarche and had her first child at 20. She retains her uterus and ovaries. Her current cancer screening includes annual clinical breast exam and annual mammogram. She had a colonoscopy in 2016 that was normal. She was interested in discussing her risk for a hereditary cancer syndrome.  Ms. Barnett was interested in pursuing a multigene panel, and therefore the CancerNext panel was ordered through [facility] which analyzes 36 genes associated with an increased cancer risk. Results from this testing are expected in approximately 2-3 weeks.    FAMILY HISTORY (see attached pedigree):   Sister:   Breast cancer, 48  Mother:  Lung cancer, 78  Mat. Great Aunt: Breast cancer  Mat. Great Aunt: Breast cancer  Mat. Grandfather: Melanoma, 70s  Pat. Grandfather: Prostate cancer    Medical records regarding the family history were not available for review.     RISK ASSESSMENT:  Ms. Barnett’s family history of breast cancer led to concern regarding a hereditary cancer syndrome.  She meets NCCN guidelines criteria for BRCA1/2 testing based on her family history of breast cancer in at least three close relatives. If genetic testing is negative, Ms. Barnett’s management should be guided by family history. These risk assessments are based on the family history information provided at the time of the appointment.      GENETIC COUNSELING: We reviewed the family history information in detail.  Cases of cancer follow three general patterns: sporadic, familial, and hereditary.  While most cancer is sporadic, some cases appear to occur in family clusters.  These cases are said to be familial and account for 10-20% of certain cancer cases.  Familial cases may be due to a combination of shared genes and  environmental factors among family members.  In even fewer families, the cancer is said to be inherited, and the genes responsible for the cancer are known.      Family histories typical of hereditary cancer syndromes usually include multiple first- and second-degree relatives diagnosed with cancer types that define a syndrome.  These cases tend to be diagnosed at younger-than-expected ages and can be bilateral or multifocal.  The cancer in these families follows an autosomal dominant inheritance pattern, which indicates the likely presence of a mutation in a cancer susceptibility gene.  Children and siblings of an individual believed to carry this mutation have a 50% chance of inheriting that mutation, thereby inheriting the increased risk to develop cancer.  These mutations can be passed down from the maternal or the paternal lineage.    Based on Ms. Barnett’s family history, we discussed that hereditary breast cancer accounts for approximately 5-10% of all cases of breast cancer.  A significant proportion of hereditary breast and ovarian cancer can be attributed to mutations in the BRCA1 and BRCA2 genes.  Mutations in these genes confer an increased risk for breast cancer, ovarian cancer, male breast cancer, prostate cancer, and pancreatic cancer. Women with a BRCA1 or BRCA2 mutation have up to an 87% lifetime risk of breast cancer and up to a 44% risk of ovarian cancer.     There are other, more rare, hereditary cancer syndromes. Some of these conditions have well defined cancer risks and established management guidelines.  Other genes that can be tested for have been more recently described, and there may be less data regarding the risks and therefore may not have established management guidelines.  We discussed these limitations at length. Based on Ms. Barnett’s family history and her desire to get more information regarding her personal risks she opted to pursue testing through a panel evaluating several  other genes known to increase the risk for cancer.    GENETIC TESTING:  The risks, benefits and limitations of genetic testing and implications for clinical management following testing were reviewed. DNA test results can influence decisions regarding screening and prevention.  Genetic testing can have significant psychological implications for both individuals and families. Also discussed was the possibility of employment and insurance discrimination based on genetic test results and the federal and states laws that are in place to prevent this.         We discussed multigene panel testing, which would involve testing BRCA1/2 and an additional 34 genes associated with an increased cancer risk. The benefits and limitations of genetic testing were discussed and Ms. Barnett decided to pursue testing of the genes via the panel. The implications of a positive or negative test result were discussed.  We also discussed the importance of testing on an affected relative.  In cases where an affected relative is not available for testing or not willing to pursue testing, it is appropriate to offer testing to an unaffected individual. We discussed the possibility that, in some cases, genetic test results may be ambiguous due to the identification of a genetic variant. These variants may or may not be associated with an increased cancer risk. With multigene panel testing, it is not uncommon for a variant of uncertain significance (VUS) to be identified.  If a VUS is identified, testing family members is not recommended and screening recommendations are made based on the family history.  The laboratories that perform genetic testing work to reclassify the VUS and send out an amended report if and when a VUS is reclassified.  The majority of variant findings are ultimately reclassified to a negative result. Given her family history, a negative test result does not eliminate all cancer risk, although the risk would not be as high  as it would with positive genetic testing.     PLAN:  A saliva kit is being mailed to Ms. Barnett’s home address and results are expected 2-3 weeks after the lab receives her sample. We will contact Ms. Barnett with her results once they are received.      Yakelin Durbin MS, [facility] [place] [facility]  Licensed Certified Genetic Counselor

## 2021-08-24 ENCOUNTER — OFFICE VISIT (OUTPATIENT)
Dept: INTERNAL MEDICINE | Facility: CLINIC | Age: 61
End: 2021-08-24

## 2021-08-24 VITALS
TEMPERATURE: 98 F | SYSTOLIC BLOOD PRESSURE: 124 MMHG | WEIGHT: 114 LBS | HEART RATE: 60 BPM | DIASTOLIC BLOOD PRESSURE: 60 MMHG | OXYGEN SATURATION: 97 % | BODY MASS INDEX: 22.38 KG/M2 | HEIGHT: 60 IN

## 2021-08-24 DIAGNOSIS — Z12.4 CERVICAL CANCER SCREENING: ICD-10-CM

## 2021-08-24 DIAGNOSIS — K21.00 GASTROESOPHAGEAL REFLUX DISEASE WITH ESOPHAGITIS WITHOUT HEMORRHAGE: ICD-10-CM

## 2021-08-24 DIAGNOSIS — L60.2 THICKENED NAIL: Primary | ICD-10-CM

## 2021-08-24 DIAGNOSIS — I10 ESSENTIAL HYPERTENSION: ICD-10-CM

## 2021-08-24 PROCEDURE — 99215 OFFICE O/P EST HI 40 MIN: CPT | Performed by: STUDENT IN AN ORGANIZED HEALTH CARE EDUCATION/TRAINING PROGRAM

## 2021-08-24 NOTE — PROGRESS NOTES
Chief Complaint  Areli Barnett is a 61 y.o. female presenting for Hypertension (2 mo. f/u), Back Pain, and discolored toe nail.     Patient has a past medical history of allergic rhinitis, hyperlipidemia, hypertension, palpitations, GERD, esophageal stricture, Helicobacter pylori, anxiety, depression, osteoporosis, osteoarthritis including degenerative changes of the spine.    History of Present Illness  Patient is here for follow-up after she saw her previous PCP recently and finally decided she would continue care with me.    I have prescribed her amlodipine due to repeated elevated blood pressures in the office, she picked that up but never started on medications.  She also did not report back to me the home blood pressures that I had requested, but she did check her blood pressures regularly over 2 weeks and it was typically in the 120s over 70s-80s.  Patient states she did make dietary changes that might have contributed to improvement of her blood pressures.    Her back pain has improved significantly since I saw her the first time, she states no more radiating symptoms to her lower extremities.  He is still having some pain here and there, and has been doing physical therapy and home exercises regularly.    Patient is also presenting a longstanding issue with her right great toe nail that has been thickened and discolored for a long time.  She was told it was fungal infection, went to another provider who told her it was not.  She also has been to see podiatry.    Patient also was found to have stomach ulcer in the past, she underwent EGD last in 2019.  She is using PPI and H2 blocker and still occasionally having some postprandial acid reflux.    The following portions of the patient's history were reviewed and updated as appropriate: allergies, current medications, past family history, past medical history, past social history, past surgical history and problem list.    Objective  /60 (BP Location:  "Left arm, Patient Position: Sitting, Cuff Size: Adult)   Pulse 60   Temp 98 °F (36.7 °C) (Temporal)   Ht 152.4 cm (60\")   Wt 51.7 kg (114 lb)   LMP  (LMP Unknown)   SpO2 97%   BMI 22.26 kg/m²     Physical Exam  Vitals reviewed.   Constitutional:       Appearance: Normal appearance.   HENT:      Head: Normocephalic and atraumatic.      Nose: Nose normal. No congestion.      Mouth/Throat:      Mouth: Mucous membranes are moist.   Eyes:      Extraocular Movements: Extraocular movements intact.      Conjunctiva/sclera: Conjunctivae normal.   Cardiovascular:      Rate and Rhythm: Normal rate and regular rhythm.      Heart sounds: Normal heart sounds. No murmur heard.     Pulmonary:      Effort: Pulmonary effort is normal.      Breath sounds: Normal breath sounds.   Abdominal:      General: There is no distension.      Palpations: Abdomen is soft. There is no mass.      Tenderness: There is no abdominal tenderness.   Musculoskeletal:      Cervical back: Neck supple.      Right lower leg: No edema.      Left lower leg: No edema.   Skin:     General: Skin is warm and dry.      Comments: Thickened and discolored right great toe nail with subungual hyperkeratosis.   Neurological:      Mental Status: She is alert and oriented to person, place, and time. Mental status is at baseline.   Psychiatric:         Behavior: Behavior normal.         Thought Content: Thought content normal.         Assessment/Plan   1. Essential hypertension  BP Readings from Last 3 Encounters:   08/24/21 124/60   08/03/21 150/70   06/24/21 146/92   Home blood pressures within normal, repeat blood pressure in the office today is also normal.  Her most recent blood pressure in office was elevated.  She does meet criteria for hypertension, but there is likely a component of whitecoat phenomena.  She did not start on medication and for now can continue off medication.    2. Thickened nail  Possible fungal.  She has had multiple providers evaluate " this, since there is discrepancies I will refer her for dermatology eval.  - Ambulatory Referral to Dermatology    3. Cervical cancer screening  Patient want to see OB/GYN for Pap smear.  - Ambulatory Referral to Obstetrics / Gynecology    4. Gastroesophageal reflux disease with esophagitis without hemorrhage  I recommended referral back to GI since she is already on medications and having breakthrough symptoms.  Patient declines for now.    Total time spent on chart review, charting and face-to-face with patient 44 minutes.    Return in about 2 months (around 10/24/2021) for Annual physical.    Future Appointments       Provider Department Center    10/25/2021 10:30 AM Salvatore Townsend MD [facility] INTERNAL MEDICINE [facility]          Salvatore Townsend MD  Family Medicine  08/24/2021    Note: Speech recognition transcription software may have been used to create portions of this document.  An attempt at proofreading has been made but errors in transcription could still be present.

## 2021-09-16 ENCOUNTER — DOCUMENTATION (OUTPATIENT)
Dept: GENETICS | Facility: HOSPITAL | Age: 61
End: 2021-09-16

## 2021-09-16 NOTE — PROGRESS NOTES
Areli Barnett, a 61-year-old female, was seen for genetic counseling due to a family history of breast cancer. Genetic counseling was completed via telehealth. Ms. Barnett has no personal history of cancer. She was 14 years old at menarche and had her first child at 20. She retains her uterus and ovaries. Her current cancer screening includes annual clinical breast exam and annual mammogram. She had a colonoscopy in 2016 that was normal. She was interested in discussing her risk for a hereditary cancer syndrome.  Ms. Barnett was interested in pursuing a multigene panel, and therefore the CancerNext panel was ordered through [employer] which analyzes 36 genes associated with an increased cancer risk. The genes on this panel include APC, TRICE, AXIN2, BARD1, BMPR1A, BRCA1, BRCA2, BRIP1, CDH1, CDK4, CDKN2A, CHEK2, DICER1, EPCAM, GREM1, HOXB13, MLH1, MSH2, MSH3, MSH6, MUTYH, NBN, NF1, NTHL1, PALB2, PMS2, POLD1, POLE, PTEN, RAD51C, RAD51D, RECQL, SMAD4, SMARCA4, STK11, and TP53.  Genetic testing was negative for pathogenic mutations in BRCA1/2 and 34 additional genes included on the CancerNext panel. These normal results were discussed with Ms. Barnett by telephone on 9/16/2021.    FAMILY HISTORY (see attached pedigree):   Sister:   Breast cancer, 48  Mother:  Lung cancer, 78  Mat. Great Aunt: Breast cancer  Mat. Great Aunt: Breast cancer  Mat. Grandfather: Melanoma, 70s  Pat. Grandfather: Prostate cancer    Medical records regarding the family history were not available for review.     RISK ASSESSMENT:  Ms. Barnett’s family history of breast cancer led to concern regarding a hereditary cancer syndrome.  She meets NCCN guidelines criteria for BRCA1/2 testing based on her family history of breast cancer in at least three close relatives. If genetic testing is negative, Ms. Barnett’s management should be guided by family history.     At this time, Ms. Barnett’s lifetime risk of developing breast cancer should be assessed  using family history risk assessment models. Ms. Barnett’s lifetime breast cancer risk is estimated to be up to 8.5% (Michel, v8), in comparison to the average 59-year-old female’s lifetime risk of 7.5%. Per NCCN guidelines, a lifetime risk greater than 20% is considered high risk and warrants increased screening; Ms. Barnett does not fall into this category. The assessments could change in the future should new information be obtained.    GENETIC COUNSELING: We reviewed the family history information in detail.  Cases of cancer follow three general patterns: sporadic, familial, and hereditary.  While most cancer is sporadic, some cases appear to occur in family clusters.  These cases are said to be familial and account for 10-20% of certain cancer cases.  Familial cases may be due to a combination of shared genes and environmental factors among family members.  In even fewer families, the cancer is said to be inherited, and the genes responsible for the cancer are known.      Family histories typical of hereditary cancer syndromes usually include multiple first- and second-degree relatives diagnosed with cancer types that define a syndrome.  These cases tend to be diagnosed at younger-than-expected ages and can be bilateral or multifocal.  The cancer in these families follows an autosomal dominant inheritance pattern, which indicates the likely presence of a mutation in a cancer susceptibility gene.  Children and siblings of an individual believed to carry this mutation have a 50% chance of inheriting that mutation, thereby inheriting the increased risk to develop cancer.  These mutations can be passed down from the maternal or the paternal lineage.    Based on Ms. Barnett’s family history, we discussed that hereditary breast cancer accounts for approximately 5-10% of all cases of breast cancer.  A significant proportion of hereditary breast and ovarian cancer can be attributed to mutations in the BRCA1 and  BRCA2 genes.  Mutations in these genes confer an increased risk for breast cancer, ovarian cancer, male breast cancer, prostate cancer, and pancreatic cancer. Women with a BRCA1 or BRCA2 mutation have up to an 87% lifetime risk of breast cancer and up to a 44% risk of ovarian cancer.     There are other, more rare, hereditary cancer syndromes. Some of these conditions have well defined cancer risks and established management guidelines.  Other genes that can be tested for have been   more recently described, and there may be less data regarding the risks and therefore may not have established management guidelines.  We discussed these limitations at length. Based on Ms. Barnett’s family history and her desire to get more information regarding her personal risks she opted to pursue testing through a panel evaluating several other genes known to increase the risk for cancer.    GENETIC TESTING:  The risks, benefits and limitations of genetic testing and implications for clinical management following testing were reviewed. DNA test results can influence decisions regarding screening and prevention.  Genetic testing can have significant psychological implications for both individuals and families. Also discussed was the possibility of employment and insurance discrimination based on genetic test results and the federal and states laws that are in place to prevent this.         We discussed multigene panel testing, which would involve testing BRCA1/2 and an additional 34 genes associated with an increased cancer risk. The benefits and limitations of genetic testing were discussed and Ms. Barnett decided to pursue testing of the genes via the panel. The implications of a positive or negative test result were discussed.  We also discussed the importance of testing on an affected relative.  In cases where an affected relative is not available for testing or not willing to pursue testing, it is appropriate to offer testing  to an unaffected individual. We discussed the possibility that, in some cases, genetic test results may be ambiguous due to the identification of a genetic variant. These variants may or may not be associated with an increased cancer risk. With multigene panel testing, it is not uncommon for a variant of uncertain significance (VUS) to be identified.  If a VUS is identified, testing family members is not recommended and screening recommendations are made based on the family history.  The laboratories that perform genetic testing work to reclassify the VUS and send out an amended report if and when a VUS is reclassified.  The majority of variant findings are ultimately reclassified to a negative result. Given her family history, a negative test result does not eliminate all cancer risk, although the risk would not be as high as it would with positive genetic testing.     TEST RESULTS:  Genetic testing was negative for pathogenic mutations by sequencing and rearrangement testing for the 36 genes on the CancerNext panel.  The negative result greatly lowers the risk of a hereditary cancer syndrome for Ms. Barnett.  It is possible that the family history is due to a hereditary cancer syndrome which Ms. Barnett did not happen to inherit. This assessment is based on the information provided at the time of the consultation.    CANCER PREVENTION:  Options available to individuals with an elevated lifetime risk for breast and/or ovarian cancer were briefly discussed.  Based on computer modeling, Ms. Barnett’s lifetime risk for breast cancer would not be considered “high risk” (>20%).   Per NCCN guidelines and American Cancer Society recommendations general population screening, including annual mammogram and annual clinical breast exam would be recommended.    PLAN:  Genetic counseling remains available to Ms. Barnett. If she has any questions or concerns, she is welcome to contact us at [phone].      Yakelin Durbin MS,  [facility] [place], [facility]  Licensed Certified Genetic Counselor      Cc: MD Salvatore Jernigan MD

## 2021-09-22 ENCOUNTER — TELEPHONE (OUTPATIENT)
Dept: INTERNAL MEDICINE | Facility: CLINIC | Age: 61
End: 2021-09-22

## 2021-09-22 DIAGNOSIS — F32.1 CURRENT MODERATE EPISODE OF MAJOR DEPRESSIVE DISORDER, UNSPECIFIED WHETHER RECURRENT (HCC): ICD-10-CM

## 2021-09-22 DIAGNOSIS — F32.1 CURRENT MODERATE EPISODE OF MAJOR DEPRESSIVE DISORDER, UNSPECIFIED WHETHER RECURRENT (HCC): Primary | ICD-10-CM

## 2021-09-22 RX ORDER — BUPROPION HYDROCHLORIDE 150 MG/1
150 TABLET ORAL DAILY
Qty: 30 TABLET | Refills: 2 | Status: SHIPPED | OUTPATIENT
Start: 2021-09-22 | End: 2021-12-13 | Stop reason: SDUPTHER

## 2021-09-22 NOTE — TELEPHONE ENCOUNTER
[facility] [address] [phone] PH - [phone] FX    Relationship: Pharmacy      Medication requested (name and dosage): buPROPion XL (WELLBUTRIN XL) 150 MG 24 hr tablet [[id number]] (Order [id number])      Pharmacy where request should be sent: [facility] [phone]  - [phone]  [phone]    Additional details provided by patient: PHARMACIST CALLED TO REQUEST REFILL PER CUSTOMER REQUEST.      PHARMACIST STATED PATIENT USED TO RECEIVE REFILLS FROM A PHARMACY THAT HAS NOW CLOSED.     Best call back number: [id number] - [phone]  - [phone] FX    Does the patient have less than a 3 day supply:  [x] Yes  [] No    Ness Louis Rep   09/22/21 12:12 EDT

## 2021-09-23 DIAGNOSIS — E78.2 MIXED HYPERLIPIDEMIA: ICD-10-CM

## 2021-09-23 RX ORDER — ATORVASTATIN CALCIUM 20 MG/1
20 TABLET, FILM COATED ORAL
Qty: 30 TABLET | Refills: 11 | Status: SHIPPED | OUTPATIENT
Start: 2021-09-23 | End: 2021-09-23

## 2021-09-23 RX ORDER — ATORVASTATIN CALCIUM 20 MG/1
20 TABLET, FILM COATED ORAL
Qty: 30 TABLET | Refills: 5 | Status: SHIPPED | OUTPATIENT
Start: 2021-09-23 | End: 2022-10-20

## 2021-10-06 PROBLEM — Z80.3 FAMILY HISTORY OF BREAST CANCER: Status: ACTIVE | Noted: 2021-10-06

## 2021-10-25 ENCOUNTER — LAB (OUTPATIENT)
Dept: LAB | Facility: HOSPITAL | Age: 61
End: 2021-10-25

## 2021-10-25 ENCOUNTER — OFFICE VISIT (OUTPATIENT)
Dept: INTERNAL MEDICINE | Facility: CLINIC | Age: 61
End: 2021-10-25

## 2021-10-25 VITALS
HEIGHT: 59 IN | DIASTOLIC BLOOD PRESSURE: 80 MMHG | OXYGEN SATURATION: 99 % | BODY MASS INDEX: 21.73 KG/M2 | HEART RATE: 77 BPM | TEMPERATURE: 97.8 F | WEIGHT: 107.8 LBS | SYSTOLIC BLOOD PRESSURE: 134 MMHG

## 2021-10-25 DIAGNOSIS — E78.49 FAMILIAL COMBINED HYPERLIPIDEMIA: Primary | ICD-10-CM

## 2021-10-25 DIAGNOSIS — F10.20 ALCOHOL DEPENDENCE, EPISODIC DRINKING BEHAVIOR (HCC): ICD-10-CM

## 2021-10-25 DIAGNOSIS — E78.2 MIXED HYPERLIPIDEMIA: ICD-10-CM

## 2021-10-25 DIAGNOSIS — M54.41 CHRONIC RIGHT-SIDED LOW BACK PAIN WITH RIGHT-SIDED SCIATICA: ICD-10-CM

## 2021-10-25 DIAGNOSIS — G89.29 CHRONIC RIGHT-SIDED LOW BACK PAIN WITH RIGHT-SIDED SCIATICA: ICD-10-CM

## 2021-10-25 DIAGNOSIS — M47.26 OSTEOARTHRITIS OF SPINE WITH RADICULOPATHY, LUMBAR REGION: ICD-10-CM

## 2021-10-25 DIAGNOSIS — R93.7 ABNORMAL X-RAY OF LUMBAR SPINE: ICD-10-CM

## 2021-10-25 DIAGNOSIS — I10 ESSENTIAL HYPERTENSION: ICD-10-CM

## 2021-10-25 DIAGNOSIS — Z00.00 WELL ADULT EXAM: Primary | ICD-10-CM

## 2021-10-25 LAB
ALBUMIN SERPL-MCNC: 4.6 G/DL (ref 3.5–5.2)
ALBUMIN/GLOB SERPL: 2.1 G/DL
ALP SERPL-CCNC: 54 U/L (ref 39–117)
ALT SERPL W P-5'-P-CCNC: 25 U/L (ref 1–33)
ANION GAP SERPL CALCULATED.3IONS-SCNC: 7.9 MMOL/L (ref 5–15)
AST SERPL-CCNC: 26 U/L (ref 1–32)
BILIRUB SERPL-MCNC: 0.2 MG/DL (ref 0–1.2)
BUN SERPL-MCNC: 9 MG/DL (ref 6–20)
BUN/CREAT SERPL: 13.4 (ref 7–25)
CALCIUM SPEC-SCNC: 9 MG/DL (ref 8.6–10.5)
CHLORIDE SERPL-SCNC: 102 MMOL/L (ref 98–107)
CHOLEST SERPL-MCNC: 154 MG/DL (ref 0–200)
CO2 SERPL-SCNC: 26.1 MMOL/L (ref 22–29)
CREAT SERPL-MCNC: 0.67 MG/DL (ref 0.57–1)
DEPRECATED RDW RBC AUTO: 40.5 FL (ref 37–54)
ERYTHROCYTE [DISTWIDTH] IN BLOOD BY AUTOMATED COUNT: 12.1 % (ref 12.3–15.4)
GFR SERPL CREATININE-BSD FRML MDRD: 90 ML/MIN/1.73
GLOBULIN UR ELPH-MCNC: 2.2 GM/DL
GLUCOSE SERPL-MCNC: 86 MG/DL (ref 65–99)
HCT VFR BLD AUTO: 36.5 % (ref 34–46.6)
HDLC SERPL-MCNC: 75 MG/DL (ref 40–60)
HGB BLD-MCNC: 12.1 G/DL (ref 12–15.9)
LDLC SERPL CALC-MCNC: 70 MG/DL (ref 0–100)
LDLC/HDLC SERPL: 0.96 {RATIO}
MCH RBC QN AUTO: 30.4 PG (ref 26.6–33)
MCHC RBC AUTO-ENTMCNC: 33.2 G/DL (ref 31.5–35.7)
MCV RBC AUTO: 91.7 FL (ref 79–97)
PLATELET # BLD AUTO: 259 10*3/MM3 (ref 140–450)
PMV BLD AUTO: 8.8 FL (ref 6–12)
POTASSIUM SERPL-SCNC: 4.6 MMOL/L (ref 3.5–5.2)
PROT SERPL-MCNC: 6.8 G/DL (ref 6–8.5)
RBC # BLD AUTO: 3.98 10*6/MM3 (ref 3.77–5.28)
SODIUM SERPL-SCNC: 136 MMOL/L (ref 136–145)
TRIGL SERPL-MCNC: 35 MG/DL (ref 0–150)
VLDLC SERPL-MCNC: 9 MG/DL (ref 5–40)
WBC # BLD AUTO: 5.73 10*3/MM3 (ref 3.4–10.8)

## 2021-10-25 PROCEDURE — 99396 PREV VISIT EST AGE 40-64: CPT | Performed by: STUDENT IN AN ORGANIZED HEALTH CARE EDUCATION/TRAINING PROGRAM

## 2021-10-25 PROCEDURE — 36415 COLL VENOUS BLD VENIPUNCTURE: CPT

## 2021-10-25 PROCEDURE — 80061 LIPID PANEL: CPT

## 2021-10-25 PROCEDURE — 80053 COMPREHEN METABOLIC PANEL: CPT

## 2021-10-25 PROCEDURE — 85027 COMPLETE CBC AUTOMATED: CPT

## 2021-10-25 NOTE — PROGRESS NOTES
"Chief Complaint  Areli Barnett is a 61 y.o. female presenting for Annual Exam (fasting) and Pain (right leg ).     Patient has a past medical history of allergic rhinitis, hyperlipidemia, hypertension, palpitations, GERD, esophageal stricture, Helicobacter pylori, anxiety, depression, osteoporosis, osteoarthritis including degenerative changes of the spine.    History of Present Illness  Patient is here for annual physical.    She has appointment with her OB/GYN this week.  Of note she recently did genetic counseling and tested negative for BRCA.  Normal mammogram 1 year ago.    Patient has been complaining of lower back pain radiating to her right lower extremity anterior leg and thigh, sometimes to the toes.  This typically gets worse when she is walking, she is not able to walk half a mile.  She has to stop due to the pain.  When she is less active she also has less pain.  She is taking Mobic 15 mg daily and adds Tylenol.  He is requesting further work-up due to her symptoms.  No weakness, no loss of sensation or bladder/bowel control.  Of note patient went to chiropractor that did not help, she did go to physical therapy that did relieve her pain somewhat.    Patient is also reporting intermittent alcohol use, sometimes to where she almost passes out.  She does not drink daily, and now has been off alcohol for a few weeks.    The following portions of the patient's history were reviewed and updated as appropriate: allergies, current medications, past family history, past medical history, past social history, past surgical history and problem list.    Objective  /80 (BP Location: Left arm, Patient Position: Sitting, Cuff Size: Adult)   Pulse 77   Temp 97.8 °F (36.6 °C) (Temporal)   Ht 149 cm (58.66\")   Wt 48.9 kg (107 lb 12.8 oz)   LMP  (LMP Unknown)   SpO2 99%   BMI 22.03 kg/m²     Physical Exam  Vitals reviewed.   Constitutional:       Appearance: Normal appearance.   HENT:      Head: " Normocephalic and atraumatic.      Nose: Nose normal. No congestion.      Mouth/Throat:      Mouth: Mucous membranes are moist.   Eyes:      Extraocular Movements: Extraocular movements intact.      Conjunctiva/sclera: Conjunctivae normal.   Cardiovascular:      Rate and Rhythm: Normal rate and regular rhythm.      Heart sounds: Normal heart sounds. No murmur heard.      Pulmonary:      Effort: Pulmonary effort is normal.      Breath sounds: Normal breath sounds.   Abdominal:      General: There is no distension.      Palpations: Abdomen is soft. There is no mass.      Tenderness: There is no abdominal tenderness.   Musculoskeletal:         General: No tenderness.      Cervical back: Neck supple.      Right lower leg: No edema.      Left lower leg: No edema.      Comments: Back: No tenderness to palpation of the lumbar midline or paravertebral area.  He is able to bend forwards and touch the floor with her fingertips.  On side rotation bilaterally she is reporting some tenderness at endpoint bilaterally.   Skin:     General: Skin is warm and dry.   Neurological:      Mental Status: She is alert and oriented to person, place, and time. Mental status is at baseline.      Motor: No weakness.      Gait: Gait normal.      Comments: Raise leg negative to 90 degrees bilaterally.  Good bilateral strength.  She is able to walk on toes and heels.   Psychiatric:         Behavior: Behavior normal.         Thought Content: Thought content normal.         Assessment/Plan   1. Well adult exam  Counseled on recommendations for shingles vaccine 2 doses, patient will check with her pharmacy if she had the second dose or not.  Counseled on physical activity, regular walking.  Patient will follow up with OB/GYN for Pap smear.  Patient's Body mass index is 22.03 kg/m². indicating that she is within normal range (BMI 18.5-24.9). No BMI management plan needed..      2. Abnormal x-ray of lumbar spine  3. Osteoarthritis of spine with  radiculopathy, lumbar region  4. Chronic right-sided low back pain with right-sided sciatica  Patient is still having significant pain that worsens with movement and ambulation.  She did see Ortho before establishing care with me and they suggested she might need to see a spine surgeon.  She has had symptoms now for about 2 years, not due to no improvement and significant symptoms will refer for MRI and for pain management to consider possible injection if indicated.  - MRI Lumbar Spine Without Contrast; Future  - Ambulatory Referral to Pain Management  - CBC (No Diff); Future    5. Alcohol dependence, episodic drinking behavior (HCC)  Counseled on continued abstinence.  Patient is aware that this is harmful use.    6. Mixed hyperlipidemia  Patient is fasting for lipids today.  Continue on atorvastatin 20 mg.  - Lipid Panel; Future    7. Essential hypertension  BP Readings from Last 3 Encounters:   10/25/21 134/80   08/24/21 124/60   08/03/21 150/70   Currently at goal off medications.  We will continue to monitor.  - Comprehensive Metabolic Panel; Future      Return in about 3 months (around 1/25/2022) for Recheck.    Future Appointments       Provider Department Center    11/3/2021 2:10 PM Xi Dwyer MD [facility] OB GYN Matt (Cl    1/26/2022 10:30 AM Salvatore Townsend MD [facility] INTERNAL MEDICINE APOLINAR            Salvatore Townsend MD  Family Medicine  10/25/2021

## 2021-11-03 ENCOUNTER — OFFICE VISIT (OUTPATIENT)
Dept: OBSTETRICS AND GYNECOLOGY | Facility: CLINIC | Age: 61
End: 2021-11-03

## 2021-11-03 VITALS
WEIGHT: 110 LBS | HEIGHT: 60 IN | SYSTOLIC BLOOD PRESSURE: 142 MMHG | DIASTOLIC BLOOD PRESSURE: 84 MMHG | BODY MASS INDEX: 21.6 KG/M2

## 2021-11-03 DIAGNOSIS — N94.10 DYSPAREUNIA IN FEMALE: ICD-10-CM

## 2021-11-03 DIAGNOSIS — N95.2 POSTMENOPAUSAL ATROPHIC VAGINITIS: ICD-10-CM

## 2021-11-03 DIAGNOSIS — Z01.419 ENCOUNTER FOR GYNECOLOGICAL EXAMINATION (GENERAL) (ROUTINE) WITHOUT ABNORMAL FINDINGS: Primary | ICD-10-CM

## 2021-11-03 DIAGNOSIS — Z12.31 ENCOUNTER FOR SCREENING MAMMOGRAM FOR BREAST CANCER: ICD-10-CM

## 2021-11-03 PROCEDURE — 99386 PREV VISIT NEW AGE 40-64: CPT | Performed by: OBSTETRICS & GYNECOLOGY

## 2021-11-03 RX ORDER — CONJUGATED ESTROGENS 0.62 MG/G
CREAM VAGINAL
Qty: 1 EACH | Refills: 11 | Status: SHIPPED | OUTPATIENT
Start: 2021-11-03

## 2021-11-04 NOTE — PROGRESS NOTES
"Chief Complaint  Gynecologic Exam     History of Present Illness:  Patient is 61 y.o.  who presents to [facility] here as a new patient for her annual examination.  Patient reports it has been several years since her last Pap smear.  Patient is due for her mammogram.  Patient does have family history of breast cancer.  Patient has had genetic testing in the past.  Patient reports going through menopause many years ago.  Patient has not been on any hormone replacement therapy.  Patient does have complaints of vaginal dryness as well as dyspareunia.  The patient has never been on any topical estrogen cream.    History  Past Medical History:   Diagnosis Date   • Anemia    • Arthritis    • Body piercing     EARS   • Depression    • Dysphagia     REPORTS SOMETIMES SHE HAS PAIN WHEN SWALLOWING FOOD   • Elevated cholesterol    • Elevated LFTs    • Endometriosis    • Epigastric pain    • Exposure to TB     REPORTS MANY YEARS AGO. REPORTS TB TESTING HAS ALWAYS BEEN NEGATIVE.    • Family history of breast cancer 10/6/2021    2021: Genetic testing neg for pathogenic mutations in BRCA1/2 and 34 additional genes included on the CancerNext panel. Lifetime breast cancer risk is estimated to be up to 8.5%    • GERD (gastroesophageal reflux disease)    • History of bronchitis 2019   • History of chest pain     REPORTS FALL 2018 AND THAT SHE HAD TESTING THAT WAS WNL'S. REPORTS SHE IS NOW BEING CHECKED FOR GI.    • History of exercise stress test     2018 - REPORTS WAS TOLD ALL WNL'S    • History of fracture     TOE   • Hyperlipidemia    • Latex allergy    • Murmur     REPORTS \"A SLIGHT MURMUR\"   • Osteoporosis    • Plantar fasciitis    • Seasonal allergies    • Vertigo    • Wears glasses     PRN     Current Outpatient Medications on File Prior to Visit   Medication Sig Dispense Refill   • acetaminophen (Tylenol) 325 MG tablet Take 2 tablets by mouth Every 6 (Six) Hours. Prn     • alendronate " (FOSAMAX) 70 MG tablet TAKE ONE TABLET BY MOUTH EVERY 7 DAYS 12 tablet 3   • atorvastatin (LIPITOR) 20 MG tablet Take 1 tablet by mouth every night at bedtime. 30 tablet 5   • buPROPion XL (Wellbutrin XL) 150 MG 24 hr tablet Take 1 tablet by mouth Daily. 30 tablet 2   • cetirizine (zyrTEC) 10 MG tablet Take 1 tablet by mouth Every Night. 30 tablet 5   • clotrimazole-betamethasone (LOTRISONE) 1-0.05 % cream Apply  topically to the appropriate area as directed 2 (Two) Times a Day. (Patient taking differently: Apply  topically to the appropriate area as directed 2 (Two) Times a Day. PRN) 15 g 0   • cyclobenzaprine (FLEXERIL) 10 MG tablet TAKE ONE TABLET BY MOUTH THREE TIMES A DAY AS NEEDED FOR MUSCLE SPASMS. DO NOT DRIVE AFTER TAKING A DOSE 30 tablet 2   • famotidine (PEPCID) 40 MG tablet Take 1 tablet by mouth Daily As Needed for Indigestion or Heartburn. (Patient taking differently: Take 40 mg by mouth.) 30 tablet 2   • FLUoxetine (PROzac) 20 MG capsule TAKE TWO CAPSULES BY MOUTH EVERY DAY 60 capsule 11   • fluticasone (FLONASE) 50 MCG/ACT nasal spray 1 spray into the nostril(s) as directed by provider 2 (two) times a day. SHAKE LIQUID AND INSTILL 2 SPRAYS IN EACH NOSTRIL EVERY DAY 48 g 3   • meloxicam (MOBIC) 15 MG tablet 1 PO Daily with food. 90 tablet 1   • olopatadine (Patanol) 0.1 % ophthalmic solution Administer 1 drop to both eyes 2 (Two) Times a Day. (Patient taking differently: Administer 1 drop to both eyes 2 (Two) Times a Day. Once a day) 5 mL 5   • ondansetron ODT (ZOFRAN-ODT) 4 MG disintegrating tablet Take 1 tablet by mouth Every 8 (Eight) Hours As Needed for Nausea or Vomiting. 30 tablet 1   • pantoprazole (PROTONIX) 40 MG EC tablet TAKE 1 TABLET BY MOUTH DAILY EVERY MORNING 30 MINUTES BEFORE BREAKFAST. Call for follow up appt for refills. 90 tablet 3   • traZODone (DESYREL) 50 MG tablet Take 1 tablet by mouth At Night As Needed for Sleep. for sleep (Patient taking differently: Take 50 mg by mouth  Every Night. 1-2 nightly for sleep  PRN) 90 tablet 3   • tretinoin (Retin-A) 0.05 % cream Apply  topically to the appropriate area as directed At Night As Needed (acne). (Patient taking differently: Apply  topically to the appropriate area as directed At Night As Needed (acne). Every night) 20 g 2     No current facility-administered medications on file prior to visit.     Allergies   Allergen Reactions   • Latex Rash   • Penicillins Rash     Past Surgical History:   Procedure Laterality Date   • ABDOMINOPLASTY  2003   • AUGMENTATION MAMMAPLASTY Bilateral    • COLONOSCOPY  2016   • ENDOSCOPY N/A 2019    Procedure: ESOPHAGOGASTRODUODENOSCOPY with cold biopsy and esophageal dilation;  Surgeon: Brenden Steward MD;  Location: [facility] ENDOSCOPY;  Service: Gastroenterology   • ENDOSCOPY N/A 2019    Procedure: ESOPHAGOGASTRODUODENOSCOPY W/ COLD FORCEP BIOPSIES;  Surgeon: Brenden Steward MD;  Location: [facility] ENDOSCOPY;  Service: Gastroenterology   • UPPER GASTROINTESTINAL ENDOSCOPY  2019   • WISDOM TOOTH EXTRACTION       Family History   Problem Relation Age of Onset   • Breast cancer Maternal Aunt    • Lung cancer Mother    • Cancer Mother    • Heart attack Father    • Crohn's disease Sister    • Breast cancer Sister    • Cancer Sister    • Stomach cancer Paternal Grandfather    • Alcohol abuse Paternal Grandfather    • Colon cancer Neg Hx    • Cirrhosis Neg Hx    • Liver disease Neg Hx    • Liver cancer Neg Hx    • Ulcerative colitis Neg Hx    • Esophageal cancer Neg Hx      Social History     Socioeconomic History   • Marital status: Single   Tobacco Use   • Smoking status: Former Smoker     Packs/day: 0.25     Years: 5.00     Pack years: 1.25     Types: Cigarettes     Quit date: 2019     Years since quittin.7   • Smokeless tobacco: Never Used   Vaping Use   • Vaping Use: Never used   Substance and Sexual Activity   • Alcohol use: Yes     Comment: once every 2 weeks   • Drug use: Yes      "Types: Marijuana   • Sexual activity: Yes     Partners: Male       Physical Examination:  Vital Signs: /84   Ht 152.4 cm (60\")   Wt 49.9 kg (110 lb)   BMI 21.48 kg/m²     General Appearance: alert, appears stated age, and cooperative  Breasts: Examined in supine position  Symmetric without masses or skin dimpling  Nipples normal without inversion, lesions or discharge  There are no palpable axillary nodes  Bilateral implants are noted without obvious palpable abnormalities  Abdomen: no masses, no hepatomegaly, no splenomegaly, soft non-tender, no guarding and no rebound tenderness  Pelvic: Clinical staff was present for exam  External genitalia:  normal appearance of the external genitalia including Bartholin's and Setauket's glands.  :  urethral meatus normal;  Vaginal:  atrophic mucosal changes are present;  Cervix:  normal appearance.  Uterus:  normal size, shape and consistency.  Adnexa:  non palpable bilaterally.  Pap smear done and specimen sent using Thin-Prep technique    Data Review:  The following data was reviewed by: Xi Dwyer MD on 11/03/2021:     Labs:    Imaging:  Mammo Screening Digital Tomosynthesis Bilateral With CAD (11/03/2020 14:29)    Medical Records:  None    Assessment and Plan   Problem List Items Addressed This Visit     None      Visit Diagnoses     Encounter for gynecological examination (general) (routine) without abnormal findings    -  Primary  Pap was done today.  If she does not receive the results of the Pap within 2 weeks  time, she was instructed to call to find out the results.  I explained to Areli that the recommendations for Pap smear interval in a low risk patient has lengthened to 3 years time if cytology alone normal or  5 years time if both cytology and HPV testing were normal.  I encouraged her to be seen yearly for a full physical exam including breast and pelvic exam even during the off years when PAP's will not be performed.    Relevant Orders    Pap IG, " Rfx HPV ASCU    Encounter for screening mammogram for breast cancer      It is recommended per ACOG, for women at average risk to start annual mammogram screening at the age of 40 until the age of 75 and an individualized decision be made for women after age 75.  She was encouraged to continue getting yearly mammograms.  She should report any palpable breast lump(s) or skin changes regardless of mammographic findings.  I explained to Areli that notification regarding her mammogram results will come from the center performing the study.  Our office will not be routinely calling with mammogram results.  It is her responsibility to make sure that the results from the mammogram are communicated to her by the breast center.  If she has any questions about the results, she is welcome to call our office anytime.  The patient reports she will schedule her mammogram.    Areli was counseled regarding having clinical breast exams and breast self-awareness.  Women aged 29-39 years of age should have clinical breast exams every 1-3 years and yearly aged 40 and older.  The patient was counseled regarding breast self-awareness focusing on having a sense of what is normal for her breasts so that she can tell if there are changes.  Even small changes should be reported to provider.    Relevant Orders    Mammo Screening Digital Tomosynthesis Bilateral With CAD    Postmenopausal atrophic vaginitis      Discussed various options for relief of atrophic vaginal symptoms related to menopause. Discussed local therapy for treatment of vaginal symptoms only.  Discussed the different formulation options including cream, ring, and tablets.  Discussed the low risk of systemic absorption in postmenopausal women with atrophy using 25 mcgs of estradiol on a daily basis.  Recommend low dose use 2-3x/wk for maintenance of treatment.  Other treatment options were discussed including the use of water-based and silicone-based vaginal lubricants and  moisturizers.  Also discussed was the FDA approved treatment option of ospemifene for moderate to severe dyspareunia.    Relevant Medications    conjugated estrogens (Premarin) 0.625 MG/GM vaginal cream    Dyspareunia in female      I discussed with the patient the various treatment options.  Prescription is given for estrogen cream.  Patient is also instructed in the use of water-based or silicone based lubricants.  Patient is to call if no improvement in her symptoms in 4 to 6 weeks.    Relevant Medications    conjugated estrogens (Premarin) 0.625 MG/GM vaginal cream          Follow Up/Instructions:  Follow up as noted.  Patient was given instructions and counseling regarding her condition or for health maintenance advice. Please see specific information pulled into the AVS if appropriate.     Note: Speech recognition transcription software may have been used to dictate portions of this document.  An attempt at proofreading has been made though minor errors in transcription may still be present.    This note was electronically signed.  Xi Dwyer M.D.

## 2021-11-18 ENCOUNTER — HOSPITAL ENCOUNTER (OUTPATIENT)
Dept: MRI IMAGING | Facility: HOSPITAL | Age: 61
Discharge: HOME | End: 2021-11-18
Admitting: STUDENT IN AN ORGANIZED HEALTH CARE EDUCATION/TRAINING PROGRAM

## 2021-11-18 DIAGNOSIS — M47.26 OSTEOARTHRITIS OF SPINE WITH RADICULOPATHY, LUMBAR REGION: ICD-10-CM

## 2021-11-18 DIAGNOSIS — R93.7 ABNORMAL X-RAY OF LUMBAR SPINE: ICD-10-CM

## 2021-11-18 PROCEDURE — 72148 MRI LUMBAR SPINE W/O DYE: CPT

## 2021-11-19 ENCOUNTER — PATIENT MESSAGE (OUTPATIENT)
Dept: INTERNAL MEDICINE | Facility: CLINIC | Age: 61
End: 2021-11-19

## 2021-11-23 ENCOUNTER — OFFICE VISIT (OUTPATIENT)
Dept: INTERNAL MEDICINE | Facility: CLINIC | Age: 61
End: 2021-11-23

## 2021-11-23 VITALS
DIASTOLIC BLOOD PRESSURE: 82 MMHG | BODY MASS INDEX: 20.97 KG/M2 | WEIGHT: 106.8 LBS | TEMPERATURE: 97.7 F | OXYGEN SATURATION: 98 % | HEIGHT: 60 IN | SYSTOLIC BLOOD PRESSURE: 140 MMHG | HEART RATE: 89 BPM

## 2021-11-23 DIAGNOSIS — Z01.419 ENCOUNTER FOR GYNECOLOGICAL EXAMINATION (GENERAL) (ROUTINE) WITHOUT ABNORMAL FINDINGS: ICD-10-CM

## 2021-11-23 DIAGNOSIS — M47.27 OSTEOARTHRITIS OF LUMBOSACRAL SPINE WITH RADICULOPATHY: Primary | ICD-10-CM

## 2021-11-23 DIAGNOSIS — R12 HEARTBURN: Chronic | ICD-10-CM

## 2021-11-23 DIAGNOSIS — F12.90 MARIJUANA SMOKER, CONTINUOUS: ICD-10-CM

## 2021-11-23 DIAGNOSIS — M81.0 OSTEOPOROSIS, UNSPECIFIED OSTEOPOROSIS TYPE, UNSPECIFIED PATHOLOGICAL FRACTURE PRESENCE: ICD-10-CM

## 2021-11-23 PROCEDURE — 99215 OFFICE O/P EST HI 40 MIN: CPT | Performed by: STUDENT IN AN ORGANIZED HEALTH CARE EDUCATION/TRAINING PROGRAM

## 2021-11-23 RX ORDER — GABAPENTIN 300 MG/1
300 CAPSULE ORAL 2 TIMES DAILY
Qty: 60 CAPSULE | Refills: 0 | Status: SHIPPED | OUTPATIENT
Start: 2021-11-23 | End: 2021-11-24

## 2021-11-23 RX ORDER — ALENDRONATE SODIUM 70 MG/1
70 TABLET ORAL
Qty: 12 TABLET | Refills: 3 | Status: SHIPPED | OUTPATIENT
Start: 2021-11-23 | End: 2023-01-19

## 2021-11-23 RX ORDER — ONDANSETRON 4 MG/1
4 TABLET, ORALLY DISINTEGRATING ORAL EVERY 8 HOURS PRN
Qty: 30 TABLET | Refills: 1 | Status: SHIPPED | OUTPATIENT
Start: 2021-11-23

## 2021-11-23 NOTE — PROGRESS NOTES
"Chief Complaint  Areli Barnett is a 61 y.o. female presenting for Med Refill and done density test.     Patient has a past medical history of allergic rhinitis, hyperlipidemia, hypertension, palpitations, GERD, esophageal stricture, Helicobacter pylori, anxiety, depression, osteoporosis, osteoarthritis including degenerative changes of the spine rith L4-5 R radiulopathy.    History of Present Illness  Patient is here for follow-up.    She did see pain management today, initial visit with APRN.  She has follow-up on 11/30/2021.  They are discussing possible spinal injections, but they wanted to review her MRI images.    Patient is reporting tingling of her right anteromedial thigh and medial thigh down to the great toe.  This is relieved when she is laying flat, but worse when she sits up for a while or moves.  She states the pain has gotten worse recently.  She is frustrated because of the pain.  She did ask for tramadol through [facility], she has been on it in the past by previous PCP, last prescribed in June.  She never tried gabapentin.  She also smokes marijuana daily for her anxiety and pain.  She is requesting medication for pain that can help relieve some of her suffering.  Of note she is tearful today and telling me that her boyfriend's daughter just passed a few days ago possibly related to history of drug use    The following portions of the patient's history were reviewed and updated as appropriate: allergies, current medications, past family history, past medical history, past social history, past surgical history and problem list.    Objective  /82 (BP Location: Left arm, Patient Position: Sitting, Cuff Size: Adult)   Pulse 89   Temp 97.7 °F (36.5 °C) (Temporal)   Ht 152.4 cm (60\")   Wt 48.4 kg (106 lb 12.8 oz)   LMP  (LMP Unknown)   SpO2 98%   BMI 20.86 kg/m²     Physical Exam  Constitutional:       Appearance: Normal appearance.   HENT:      Head: Normocephalic.      Nose: No congestion. "   Eyes:      Extraocular Movements: Extraocular movements intact.      Conjunctiva/sclera: Conjunctivae normal.   Neurological:      Mental Status: She is alert and oriented to person, place, and time. Mental status is at baseline.      Motor: No weakness.      Gait: Gait normal.   Psychiatric:         Mood and Affect: Mood is not anxious. Affect is tearful.         Speech: Speech normal.         Behavior: Behavior normal. Behavior is cooperative.         Thought Content: Thought content normal.         Assessment/Plan   1. Osteoarthritis of lumbosacral spine with radiculopathy  With MRI and patient symptoms it is likely that this pain is caused by her degenerative spine disease and radiculopathy.  Patient did take 2 tramadol yesterday morning.  For now I recommend stopping tramadol and starting gabapentin for her neuropathic pain.  I have gone over the treatment contract for controlled substances with patient, counseled accordingly, and patient has signed papers.  Vicente reviewed.  Patient will return for follow-up visit with me in 2 weeks to evaluate.  Urine drug screen collected today, will likely be positive for marijuana and tramadol.  - gabapentin (NEURONTIN) 300 MG capsule; Take 1 capsule by mouth 2 (Two) Times a Day.  Dispense: 60 capsule; Refill: 0  - ToxASSURE Select 13 (MW) - Urine, Clean Catch; Future    2. Osteoporosis, unspecified osteoporosis type, unspecified pathological fracture presence  Continue on alendronate.  - alendronate (FOSAMAX) 70 MG tablet; Take 1 tablet by mouth Every 7 (Seven) Days.  Dispense: 12 tablet; Refill: 3    3. Heartburn  Uses occasional Zofran as needed.  - ondansetron ODT (ZOFRAN-ODT) 4 MG disintegrating tablet; Place 1 tablet on the tongue Every 8 (Eight) Hours As Needed for Nausea or Vomiting.  Dispense: 30 tablet; Refill: 1    4. Marijuana smoker, continuous  Continues to smoke, not ready to quit.    Total time spent on chart review, charting and face-to-face with patient  47 minutes.    Return in about 2 weeks (around 12/7/2021) for Recheck.    Future Appointments       Provider Department Center    12/7/2021 2:30 PM Salvatore Townsend MD [facility] INTERNAL MEDICINE [facility]    1/3/2022 1:15 PM [address]    1/4/2022 1:00 PM Artemio Biggs MD [facility] PAIN MANAGEMENT [facility]    1/26/2022 10:30 AM Salvatore Townsend MD [facility] INTERNAL MEDICINE [facility]            Salvatore Townsend MD  Family Medicine  11/23/2021

## 2021-11-24 NOTE — TELEPHONE ENCOUNTER
Please call pharmacy and see if he picked up the gabapentin.  If she did not then please have it cancelled.  This is due to violation of the treatment contract for controlled substances.  Thanks

## 2021-11-24 NOTE — TELEPHONE ENCOUNTER
Called pharmacy to D/C the Gabapentin patient had already picked it up I informed her Dr. Townsend will no longer be prescribing this medication

## 2021-11-24 NOTE — PROGRESS NOTES
Patient reported taking Concerta after signing treatment contract for controlled substances and after I had already prescribed gabapentin.  This is a violation of the treatment contract and I can no longer prescribe her controlled substances.  I can still continue as her PCP, but without the prescribing controlled medications.    Salvatore Townsend MD

## 2021-11-29 LAB — DRUGS UR: NORMAL

## 2021-12-01 DIAGNOSIS — G47.09 OTHER INSOMNIA: ICD-10-CM

## 2021-12-01 RX ORDER — TRAZODONE HYDROCHLORIDE 50 MG/1
50 TABLET ORAL NIGHTLY PRN
Qty: 90 TABLET | Refills: 3 | Status: SHIPPED | OUTPATIENT
Start: 2021-12-01 | End: 2022-12-29

## 2021-12-07 ENCOUNTER — OFFICE VISIT (OUTPATIENT)
Dept: INTERNAL MEDICINE | Facility: CLINIC | Age: 61
End: 2021-12-07

## 2021-12-07 VITALS
HEART RATE: 78 BPM | WEIGHT: 107.6 LBS | OXYGEN SATURATION: 100 % | HEIGHT: 60 IN | BODY MASS INDEX: 21.13 KG/M2 | SYSTOLIC BLOOD PRESSURE: 134 MMHG | TEMPERATURE: 97.8 F | DIASTOLIC BLOOD PRESSURE: 84 MMHG

## 2021-12-07 DIAGNOSIS — I10 ESSENTIAL HYPERTENSION: ICD-10-CM

## 2021-12-07 DIAGNOSIS — R45.851 SUICIDAL IDEATIONS: Primary | ICD-10-CM

## 2021-12-07 DIAGNOSIS — M47.27 OSTEOARTHRITIS OF LUMBOSACRAL SPINE WITH RADICULOPATHY: ICD-10-CM

## 2021-12-07 DIAGNOSIS — E78.2 MIXED HYPERLIPIDEMIA: ICD-10-CM

## 2021-12-07 DIAGNOSIS — F33.2 SEVERE EPISODE OF RECURRENT MAJOR DEPRESSIVE DISORDER, WITHOUT PSYCHOTIC FEATURES (HCC): ICD-10-CM

## 2021-12-07 DIAGNOSIS — M81.0 OSTEOPOROSIS, UNSPECIFIED OSTEOPOROSIS TYPE, UNSPECIFIED PATHOLOGICAL FRACTURE PRESENCE: ICD-10-CM

## 2021-12-07 PROCEDURE — 99215 OFFICE O/P EST HI 40 MIN: CPT | Performed by: STUDENT IN AN ORGANIZED HEALTH CARE EDUCATION/TRAINING PROGRAM

## 2021-12-07 RX ORDER — FLUOXETINE HYDROCHLORIDE 20 MG/1
60 CAPSULE ORAL DAILY
Qty: 90 CAPSULE | Refills: 5 | Status: SHIPPED | OUTPATIENT
Start: 2021-12-07 | End: 2022-05-26

## 2021-12-07 RX ORDER — CYCLOBENZAPRINE HCL 10 MG
10 TABLET ORAL 3 TIMES DAILY PRN
Qty: 30 TABLET | Refills: 2 | Status: SHIPPED | OUTPATIENT
Start: 2021-12-07 | End: 2023-03-29

## 2021-12-07 NOTE — PROGRESS NOTES
Chief Complaint  Areli Barnett is a 61 y.o. female presenting for Hypertension (2 wk. f/u) and Hyperlipidemia.     Patient has a past medical history of allergic rhinitis, hyperlipidemia, hypertension, palpitations, GERD, esophageal stricture, Helicobacter pylori, anxiety, depression, osteoporosis, osteoarthritis including degenerative changes of the spine right L4-5 R radiulopathy.    History of Present Illness  Patient is here for follow-up of her back pain, hypertension and hyperlipidemia.    Patient is still having a lot of pain that shoots down to the lateral right lateral leg.  She did  gabapentin before we were able to cancel it at the pharmacy.  She has been taking 300 mg once daily to make it less, and she feels this has helped.  She did see pain medicine [facility]. Plan for plasma inj of the spine.  This would not be covered by her insurance she says, but she is still willing to try it if it helps.  She would also like to see Dr. Biggs in January.  Patient also asking for handicap sticker due to worsening pain when she is out shopping.  It is hard for her to walk for longer distance.    Recently patient has been feeling more depressed.  She still brings alcohol occasionally, last time yesterday evening she had 2 shots.  She has been having thoughts of suicide, she also had thoughts in the past, but no plans.  She has been having thoughts about hanging herself from a rope.  She does feel that she has good support from her partner and she wants to talk about this with him.  She also would like to increase the dose of Prozac, currently she is on 40 mg.  He also continues to take Wellbutrin 150 mg.  I did refer her to behavioral health in the past, but that she felt she was doing better and cancel the appointments.  Now she feels like she would like to talk to a counselor.    Patient also has a history of osteoporosis, she is requesting repeat bone scan.    The following  "portions of the patient's history were reviewed and updated as appropriate: allergies, current medications, past family history, past medical history, past social history, past surgical history and problem list.    Objective  /84 (BP Location: Left arm, Patient Position: Sitting, Cuff Size: Adult)   Pulse 78   Temp 97.8 °F (36.6 °C) (Temporal)   Ht 152.4 cm (60\")   Wt 48.8 kg (107 lb 9.6 oz)   LMP  (LMP Unknown)   SpO2 100%   BMI 21.01 kg/m²     Physical Exam  Constitutional:       General: She is in acute distress.      Appearance: Normal appearance. She is not ill-appearing, toxic-appearing or diaphoretic.   HENT:      Head: Normocephalic and atraumatic.      Mouth/Throat:      Mouth: Mucous membranes are dry.      Pharynx: Oropharynx is clear.   Neurological:      Mental Status: She is alert and oriented to person, place, and time. Mental status is at baseline.      Motor: No weakness.      Gait: Gait normal.   Psychiatric:         Attention and Perception: Attention normal.         Mood and Affect: Mood is depressed. Mood is not anxious. Affect is tearful.         Speech: Speech normal.         Behavior: Behavior normal. Behavior is cooperative.         Thought Content: Thought content normal.         Cognition and Memory: Cognition normal.         Judgment: Judgment normal.         Assessment/Plan   1. Severe episode of recurrent major depressive disorder, without psychotic features (HCC)  2. Suicidal ideations  No history of suicide attempt.  Patient is having suicidal ideation, this is likely in setting of her pain condition, underlying depression and partially episodic alcohol use.  Will refer for urgent appointment at behavioral health.  We will also increase her Prozac from 40 to 60 mg.  She will follow up with me in 2 weeks, sooner if needed.  - FLUoxetine (PROzac) 20 MG capsule; Take 3 capsules by mouth Daily.  Dispense: 90 capsule; Refill: 5  - Ambulatory Referral to Behavioral Health    3. " Osteoarthritis of lumbosacral spine with radiculopathy  Appears fairly stable.  Continue follow-up with pain management.  Patient verbalizes understanding regarding controlled substances.  I have explained to her that it is a concern and people are using multiple substances, in her case this includes marijuana, alcohol, tramadol and methylphenidate that is not prescribed to her.  This puts her at increased risk for adverse effects from use of these potentially harmful substances, which is why I cannot prescribe her any more controlled substances.  She verbalizes understanding and would still like to continue care with me.    4. Essential hypertension  BP Readings from Last 3 Encounters:   12/07/21 134/84   11/23/21 140/82   11/03/21 142/84   Blood pressure currently at goal.  Continue on current regimen.    5. Mixed hyperlipidemia  The 10-year ASCVD risk score (Maybeury ANGIE Jr., et al., 2013) is: 2.1%    Values used to calculate the score:      Age: 61 years      Sex: Female      Is Non- : No      Diabetic: No      Tobacco smoker: No      Systolic Blood Pressure: 134 mmHg      Is BP treated: No      HDL Cholesterol: 75 mg/dL      Total Cholesterol: 154 mg/dL  Continue on atorvastatin 20 mg    6. Osteoporosis, unspecified osteoporosis type, unspecified pathological fracture presence  We will repeat DEXA.  - DEXA Bone Density Axial; Future    Total time spent on chart review, charting and face-to-face with patient 45 minutes.    Return in about 2 weeks (around 12/21/2021) for Recheck.    Salvatore Townsend MD  Family Medicine  12/07/2021

## 2021-12-08 DIAGNOSIS — K21.00 GASTROESOPHAGEAL REFLUX DISEASE WITH ESOPHAGITIS WITHOUT HEMORRHAGE: ICD-10-CM

## 2021-12-08 RX ORDER — FAMOTIDINE 40 MG/1
TABLET, FILM COATED ORAL
Qty: 30 TABLET | Refills: 2 | Status: SHIPPED | OUTPATIENT
Start: 2021-12-08 | End: 2023-03-29

## 2021-12-13 ENCOUNTER — OFFICE VISIT (OUTPATIENT)
Dept: PSYCHIATRY | Facility: CLINIC | Age: 61
End: 2021-12-13

## 2021-12-13 VITALS
SYSTOLIC BLOOD PRESSURE: 142 MMHG | BODY MASS INDEX: 21.01 KG/M2 | DIASTOLIC BLOOD PRESSURE: 84 MMHG | WEIGHT: 107 LBS | HEIGHT: 60 IN

## 2021-12-13 DIAGNOSIS — F32.1 CURRENT MODERATE EPISODE OF MAJOR DEPRESSIVE DISORDER, UNSPECIFIED WHETHER RECURRENT (HCC): ICD-10-CM

## 2021-12-13 PROCEDURE — 90792 PSYCH DIAG EVAL W/MED SRVCS: CPT | Performed by: NURSE PRACTITIONER

## 2021-12-13 RX ORDER — GABAPENTIN 300 MG/1
300 CAPSULE ORAL 3 TIMES DAILY
COMMUNITY
End: 2022-01-18 | Stop reason: SDDI

## 2021-12-13 RX ORDER — BUPROPION HYDROCHLORIDE 300 MG/1
300 TABLET ORAL DAILY
Qty: 30 TABLET | Refills: 2 | Status: SHIPPED | OUTPATIENT
Start: 2021-12-13 | End: 2022-02-18 | Stop reason: SDUPTHER
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
## 2021-12-17 ENCOUNTER — PATIENT ROUNDING (BHMG ONLY) (OUTPATIENT)
Dept: PSYCHIATRY | Facility: CLINIC | Age: 61
End: 2021-12-17

## 2021-12-17 DIAGNOSIS — Q39.8 INLET PATCH OF ESOPHAGUS: Chronic | ICD-10-CM

## 2021-12-17 DIAGNOSIS — R12 HEARTBURN: Chronic | ICD-10-CM

## 2021-12-17 DIAGNOSIS — K25.9 GASTRIC ULCER WITHOUT HEMORRHAGE OR PERFORATION, UNSPECIFIED CHRONICITY: ICD-10-CM

## 2021-12-17 RX ORDER — PANTOPRAZOLE SODIUM 40 MG/1
TABLET, DELAYED RELEASE ORAL
Qty: 90 TABLET | Refills: 3 | Status: SHIPPED | OUTPATIENT
Start: 2021-12-17 | End: 2022-05-26

## 2021-12-17 NOTE — PROGRESS NOTES
December 17, 2021    Hello, may I speak with Arelibautista Barnett?    My name is Kathy Tucker    I am  with [facility]  [address] [place] [id number] [phone].    Before we get started may I verify your date of birth? 1960    I am calling to officially welcome you to our practice and ask about your recent visit. Is this a good time to talk? yes  Tell me about your visit with us. What things went well?  Ricardo listened, answered my questions, was very kind. And increased my medications and I am feeling better.       We're always looking for ways to make our patients' experiences even better. Do you have recommendations on ways we may improve?  no nothing    Overall were you satisfied with your first visit to our practice? Yes, very satisfied       I appreciate you taking the time to speak with me today. Is there anything else I can do for you? No, but I advised to call with any questions or concerns, anytime.      Thank you, and have a great day.

## 2022-01-02 NOTE — PROGRESS NOTES
"Chief Complaint: \"Pain in my lower back and right leg.\"        History of Present Illness:   Patient: Ms. Areli Barnett, 61 y.o. female   Referring Physician: Salvatore Townsend MD   Reason for consultation: Chronic intractable lower back pain  Pain History: Patient reports a longstanding history of progressive lower back and right lower extremity pain, which began without incident.  The majority of her pain is in her lower back with some radiation to the right lower extremity. Pain interferes with most activities, particularly walking. Patient reports some weakness and tingling but denies numbness, or any signs or symptoms of cauda equina. Patient has a history of alcohol abuse with binge eating to the extent that she almost passes out.  MRI of the lumbar spine on 11/18/2021 revealed disc disease and facet hypertrophy at L4-L5 and L5-S1.  At L4-L5 there is a rightward disc osteophyte complex and facet hypertrophy contributing to right-sided canal stenosis, right lateral recess stenosis, severe right neuroforaminal stenosis.  There are moderate endplate changes.  Patient has failed to obtain pain relief with conservative measures including oral analgesics, physical therapy, chiropractic therapy 2-4 x per week (Robert Swartz), to name a few.  She has been taking Mobic and Tylenol without significant relief. She failed a trial with gabapentin. Pain has progressed in intensity over the past several months.    Pain Description: Constant lower back pain with intermittent exacerbation, described as aching, burning and throbbing sensation.   Radiation of Pain: The pain radiates from the lumbar region down into the posterior aspect of the right thigh and right calf   Pain intensity today: 2/10  Average pain intensity last week: 4/10  Pain intensity ranges from: 1/10 to 5/10  Aggravating factors: Pain increases with lifting, bending, twisting, standing, walking. Patient describes neurogenic claudication. She avoids walking. " Patient does not use a cane or walker. She used crutches when her pain started.  Alleviating factors: Pain decreases with sitting down, lying down   Associated Symptoms:   Patient reports pain, tingling and weakness in the right lower extremity. Denies left sided symptoms at this time  Patient denies any new bladder or bowel problems.   Patient denies difficulties with her balance or recent falls.     Review of previous therapies and additional medical records:  Areli Barnett has already failed the following measures, including:   Conservative Measures: Oral analgesics, ice, heat, TENs, chiropractic therapy (actively attending [facility] Garcia 4 x per week), physical therapy   Interventional Measures: None  Surgical Measures: No history of previous lumbar spine or hip surgery   Areli Barnett has not undergone orthopedic spine or neurosurgical consultation for chronic pain condition   Areli Barnett presents with significant comorbidities including hyperlipidemia, hypertension, GERD, esophageal stricture, osteoporosis, osteoarthritis, anxiety, depression, alcohol dependence, episodic drinking behavior  In terms of current analgesics, Areli Barnett takes: Acetaminophen, cyclobenzaprine, meloxicam. Patient also takes Fosamax, bupropion, Prozac, Zofran, trazodone  I have reviewed Vicente Report #[id number] no CS) consistent with medication reconciliation.  SOAPP: High Risk     Global Pain Scale 01-04  2022          Pain 11          Feelings 20          Clinical outcomes 16          Activities 20          GPS Total: 67            Review of Diagnostic Studies:  I have reviewed the images with the patient as well as the reports, as follows;  MRI of the lumbar spine without contrast on 11/18/2021.  Vertebral body heights and alignment are preserved.  The conus medullaris is seen at T12-L1 and has an unremarkable appearance.  Axial imaging:  T12-L1, L1-L2, L2-L3, L3-L4: Disc bulges.  No significant canal or foraminal  stenosis  L4-L5: Modic endplate changes with severe degenerative disc disease.  Disc osteophyte complex, facet hypertrophy contributing to mild central canal stenosis, right lateral recess stenosis, and severe right and mild left neuroforaminal stenosis  L5-S1: No significant canal or foraminal stenosis  X-rays of the lumbar spine on 5/21/2021.  Vertebral body heights and alignment are preserved.  There is loss of disc space height and facet arthropathy at L4-L5 and L5-S1.  Hip x-rays on 5/12/2021: Joint spaces are preserved.  No evidence of fracture or dislocation.    Review of Systems   HENT: Positive for tinnitus.    Genitourinary: Positive for flank pain.   Musculoskeletal: Positive for back pain and gait problem.   Allergic/Immunologic: Positive for environmental allergies and food allergies.   All other systems reviewed and are negative.        Patient Active Problem List   Diagnosis   • Allergic rhinitis   • Anxiety state   • Mixed hyperlipidemia   • Osteoporosis   • Gastroesophageal reflux disease with esophagitis   • Other insomnia   • Depressed   • Nausea   • Dysphagia   • Heartburn   • Periumbilical abdominal pain   • Osteoarthritis   • Chronic superficial gastritis without bleeding   • Ganglion cyst of wrist, left   • Esophageal stricture   • Gastric ulcer without hemorrhage or perforation   • Inlet patch of esophagus   • Helicobacter pylori infection   • Paresthesia of both hands   • Ulnar neuropathy at elbow   • Right hip pain   • Pain in joint involving multiple sites   • Palpitations   • Essential hypertension   • Family history of breast cancer   • Alcohol dependence, episodic drinking behavior (HCC)   • Marijuana smoker, continuous   • Suicidal ideations   • Lumbar disc herniation with radiculopathy   • Lumbar stenosis with neurogenic claudication   • Lumbar facet arthropathy   • Anxiety and depression       Past Medical History:   Diagnosis Date   • Anemia    • Arthritis    • Body piercing      "EARS   • Depression    • Dysphagia     REPORTS SOMETIMES SHE HAS PAIN WHEN SWALLOWING FOOD   • Elevated cholesterol    • Elevated LFTs    • Endometriosis    • Epigastric pain    • Exposure to TB     REPORTS MANY YEARS AGO. REPORTS TB TESTING HAS ALWAYS BEEN NEGATIVE.    • Family history of breast cancer 10/6/2021    9/2021: Genetic testing neg for pathogenic mutations in BRCA1/2 and 34 additional genes included on the [employer] panel. Lifetime breast cancer risk is estimated to be up to 8.5%    • GERD (gastroesophageal reflux disease)    • History of bronchitis 01/2019   • History of chest pain     REPORTS FALL 2018 AND THAT SHE HAD TESTING THAT WAS WNL'S. REPORTS SHE IS NOW BEING CHECKED FOR GI.    • History of exercise stress test     2018 - REPORTS WAS TOLD ALL WNL'S    • History of fracture     TOE   • Hyperlipidemia    • Latex allergy    • Murmur     REPORTS \"A SLIGHT MURMUR\"   • Osteoporosis    • Plantar fasciitis    • Seasonal allergies    • Vertigo    • Wears glasses     PRN         Past Surgical History:   Procedure Laterality Date   • ABDOMINOPLASTY  2003   • AUGMENTATION MAMMAPLASTY Bilateral    • COLONOSCOPY  02/02/2016   • ENDOSCOPY N/A 2/12/2019    Procedure: ESOPHAGOGASTRODUODENOSCOPY with cold biopsy and esophageal dilation;  Surgeon: Brenden Steward MD;  Location: [facility] ENDOSCOPY;  Service: Gastroenterology   • ENDOSCOPY N/A 6/11/2019    Procedure: ESOPHAGOGASTRODUODENOSCOPY W/ COLD FORCEP BIOPSIES;  Surgeon: Brenden Steward MD;  Location: [facility] ENDOSCOPY;  Service: Gastroenterology   • UPPER GASTROINTESTINAL ENDOSCOPY  02/12/2019   • WISDOM TOOTH EXTRACTION           Family History   Problem Relation Age of Onset   • Breast cancer Maternal Aunt    • Lung cancer Mother    • Cancer Mother    • Heart attack Father    • Crohn's disease Sister    • Breast cancer Sister    • Cancer Sister    • Stomach cancer Paternal Grandfather    • Alcohol abuse Paternal Grandfather    • Colon cancer Neg Hx    • " Cirrhosis Neg Hx    • Liver disease Neg Hx    • Liver cancer Neg Hx    • Ulcerative colitis Neg Hx    • Esophageal cancer Neg Hx          Social History     Socioeconomic History   • Marital status: Single   Tobacco Use   • Smoking status: Former Smoker     Packs/day: 0.25     Years: 5.00     Pack years: 1.25     Types: Cigarettes     Quit date: 2019     Years since quittin.9   • Smokeless tobacco: Never Used   Vaping Use   • Vaping Use: Some days   • Substances: THC, CBD   Substance and Sexual Activity   • Alcohol use: Yes     Comment: once monthly   • Drug use: Yes     Types: Marijuana     Comment: daily   • Sexual activity: Yes     Partners: Male          Current Outpatient Medications:   •  acetaminophen (Tylenol) 325 MG tablet, Take 2 tablets by mouth Every 6 (Six) Hours. Prn, Disp: , Rfl:   •  alendronate (FOSAMAX) 70 MG tablet, Take 1 tablet by mouth Every 7 (Seven) Days., Disp: 12 tablet, Rfl: 3  •  atorvastatin (LIPITOR) 20 MG tablet, Take 1 tablet by mouth every night at bedtime., Disp: 30 tablet, Rfl: 5  •  buPROPion XL (Wellbutrin XL) 300 MG 24 hr tablet, Take 1 tablet by mouth Daily., Disp: 30 tablet, Rfl: 2  •  cetirizine (zyrTEC) 10 MG tablet, Take 1 tablet by mouth Every Night. (Patient taking differently: Take 10 mg by mouth Every Night. PRN), Disp: 30 tablet, Rfl: 5  •  clotrimazole-betamethasone (LOTRISONE) 1-0.05 % cream, Apply  topically to the appropriate area as directed 2 (Two) Times a Day. (Patient taking differently: Apply  topically to the appropriate area as directed 2 (Two) Times a Day. PRN), Disp: 15 g, Rfl: 0  •  conjugated estrogens (Premarin) 0.625 MG/GM vaginal cream, Use 0.5 grams intravaginally twice weekly to control symptoms., Disp: 1 each, Rfl: 11  •  cyclobenzaprine (FLEXERIL) 10 MG tablet, Take 1 tablet by mouth 3 (Three) Times a Day As Needed for Muscle Spasms., Disp: 30 tablet, Rfl: 2  •  famotidine (PEPCID) 40 MG tablet, TAKE ONE TABLET BY MOUTH EVERY DAY AS NEEDED  "FOR indigestion OR heartburn, Disp: 30 tablet, Rfl: 2  •  FLUoxetine (PROzac) 20 MG capsule, Take 3 capsules by mouth Daily. (Patient taking differently: Take 40 mg by mouth Daily.), Disp: 90 capsule, Rfl: 5  •  fluticasone (FLONASE) 50 MCG/ACT nasal spray, 1 spray into the nostril(s) as directed by provider 2 (two) times a day. SHAKE LIQUID AND INSTILL 2 SPRAYS IN EACH NOSTRIL EVERY DAY (Patient taking differently: 1 spray into the nostril(s) as directed by provider 2 (two) times a day. SHAKE LIQUID AND INSTILL 2 SPRAYS IN EACH NOSTRIL EVERY DAY PRN), Disp: 48 g, Rfl: 3  •  meloxicam (MOBIC) 15 MG tablet, 1 PO Daily with food., Disp: 90 tablet, Rfl: 1  •  olopatadine (Patanol) 0.1 % ophthalmic solution, Administer 1 drop to both eyes 2 (Two) Times a Day. (Patient taking differently: Administer 1 drop to both eyes 2 (Two) Times a Day. Once a day), Disp: 5 mL, Rfl: 5  •  ondansetron ODT (ZOFRAN-ODT) 4 MG disintegrating tablet, Place 1 tablet on the tongue Every 8 (Eight) Hours As Needed for Nausea or Vomiting., Disp: 30 tablet, Rfl: 1  •  pantoprazole (PROTONIX) 40 MG EC tablet, TAKE 1 TABLET BY MOUTH DAILY EVERY MORNING 30 MINUTES BEFORE BREAKFAST. Call for follow up appt for refills., Disp: 90 tablet, Rfl: 3  •  traZODone (DESYREL) 50 MG tablet, Take 1 tablet by mouth At Night As Needed for Sleep. for sleep, Disp: 90 tablet, Rfl: 3  •  tretinoin (Retin-A) 0.05 % cream, Apply  topically to the appropriate area as directed At Night As Needed (acne). (Patient taking differently: Apply  topically to the appropriate area as directed At Night As Needed (acne). Every night), Disp: 20 g, Rfl: 2  •  gabapentin (NEURONTIN) 300 MG capsule, Take 300 mg by mouth 3 (Three) Times a Day., Disp: , Rfl:       Allergies   Allergen Reactions   • Latex Rash   • Penicillins Rash         /83   Pulse 94   Temp 98.1 °F (36.7 °C) (Infrared)   Resp 16   Ht 152.4 cm (60\")   Wt 48.3 kg (106 lb 6.4 oz)   LMP  (LMP Unknown)   SpO2 " 96%   BMI 20.78 kg/m²       Physical Exam:  Constitutional: Patient appears well-developed, well-nourished, well-hydrated  HEENT: Head: Normocephalic and atraumatic  Eyes: Conjunctivae and lids are normal  Pupils: Equal, round, reactive to light  Peripheral vascular exam: Posterior tibialis: right 2+ and left 2+. Dorsalis pedis: right 2+ and left 2+. No edema.   Musculoskeletal   Gait and station: Gait evaluation demonstrated an antalgic gait. Able to walk on heels, toes with some difficulties due to right leg pain and weakness  Lumbar spine: Passive and active range of motion are full and without pain. Extension, flexion, lateral flexion, rotation of the lumbar spine did not increase or reproduce pain. Lumbar facet joint loading maneuvers are negative.   Kong test and Gaenslen's test are negative   Piriformis maneuvers are negative   Palpation of the bilateral greater trochanter, unrevealing   Examination of the Iliotibial band: unrevealing   Hip joints: The range of motion of the hip joints is full and without pain   Neurological:   Patient is alert and oriented to person, place, and time.   Speech: Normal.   Cortical function: Normal mental status.   Reflex Scores:  Right patellar: 0+  Left patellar: 0+  Right Achilles: 0+  Left Achilles: 0+  Motor strength: 5/5  Motor Tone: Normal  Involuntary movements: None.   Superficial/Primitive Reflexes: Primitive reflexes were absent.   Right Parker: Absent  Left Parker: Absent  Right ankle clonus: Absent  Left ankle clonus: Absent   Babinsky: Absent  Long tract signs: Negative. Straight leg raising test: Negative on the left, slightly positive on the right at 30-40 degrees with positive Lasegue. Femoral stretch sign: Negative.   Sensory exam: Intact to light touch, intact pain and temperature sensation, intact vibration sensation and normal proprioception.   Coordination: Normal finger to nose and heel to shin. Normal balance and negative Romberg's sign   Skin and  subcutaneous tissue: Skin is warm and intact. No rash noted. No cyanosis.   Psychiatric: Judgment and insight: Normal. Recent and remote memory: Intact. Mood and affect: Normal.     ASSESSMENT:   1. Lumbar disc herniation with radiculopathy    2. Lumbar stenosis with neurogenic claudication    3. Lumbar facet arthropathy    4. Osteoporosis, unspecified osteoporosis type, unspecified pathological fracture presence    5. Other insomnia    6. Marijuana smoker, continuous    7. Alcohol dependence, episodic drinking behavior (HCC)    8. Anxiety and depression        PLAN/MEDICAL DECISION MAKING: Patient reports a longstanding history of progressive lower back and right lower extremity pain, which began without incident.  The majority of her pain is in her right lower extremity rather than her lower back. Pain interferes with most activities, particularly standing and walking. Patient reports some weakness and tingling but denies numbness, or any signs or symptoms of cauda equina. Patient presents with significant comorbidities particularly a history of alcohol abuse with binge eating disorder. MRI of the lumbar spine on 11/18/2021 revealed multilevel degenerative disc disease and facet hypertrophy especially at L4-L5 and L5-S1.  At L4-L5 there is a rightward disc osteophyte complex and facet hypertrophy contributing to right-sided canal stenosis, right lateral recess stenosis, severe right neuroforaminal stenosis affecting the exiting L4 nerve root and the descending right L5 nerve root.  There are Modic endplate changes.  Patient has failed to obtain pain relief with conservative measures including oral analgesics, physical therapy, chiropractic therapy 4 x per week (Robert Swartz), to name a few.  Patient has failed to obtain pain relief with conservative measures, as referenced above. I have reviewed all available patient's medical records as well as previous therapies as referenced above. I had a lengthy conversation  with Ms. Areli Barnett regarding her chronic pain condition and potential therapeutic options including risks, benefits, alternative therapies, to name a few. Therefore, I have proposed the following plan:  1. Diagnostic studies:  A. Flexion and extension X-rays of the lumbar spine to assess stability  B. EMG/NCV of the bilateral lower extremities   C. If she continues to struggle with pain, we may obtain lumbar myelogram followed by CT postmyelogram  2. Pharmacological measures: Reviewed and discussed; Patient takes acetaminophen, cyclobenzaprine, meloxicam. Patient also takes Fosamax, bupropion, Prozac, Zofran, trazodone. Patient has declined additional pharmacological measures   3. Interventional pain management measures: Patient will be scheduled for diagnostic and therapeutic right L4-L5 and right L5-S1 transforaminal epidural steroid injections. We may repeat epidurals depending on patient's outcome.  If she continues to struggle with pain, we may obtain lumbar myelogram followed by CT postmyelogram. At the request of the patient, I discussed prospects of regenerative therapies particularly platelet rich leukocyte poor plasma therapy and A2M  4. Long-term rehabilitation efforts:  A. The patient does not have a history of falls. I did complete a risk assessment for falls  B. Patient will start a comprehensive physical therapy program at [facility] for core strengthening, gait and balance training, neurodynamics and Alter-G   C. Start an exercise program such as water therapy  D. Continue using TENS unit  E. Continue contrast therapy: Apply ice-packs for 15-20 minutes, followed by heating pads for 15-20 minutes to affected area   F. Referral to Dr. Carlos Dotson for cognitive behavioral therapy and biofeedback (Hx anxiety, depression, ETOH abuse, Hxsubstance abuse).  5. The patient has been instructed to contact my office with any questions or difficulties. The patient understands the plan and  agrees to proceed accordingly.        Patient Care Team:  Salvatore Townsend MD as PCP - General (Family Medicine)  Salvatore Townsend MD as PCP - Family Medicine (Family Medicine)     No orders of the defined types were placed in this encounter.        Future Appointments   Date Time Provider Department Center   1/11/2022  3:15 PM Ricardo Matt APRN [facility]   1/26/2022 10:30 AM Salvatore Townsend MD [facility] IM [facility]   2/21/2022 11:00 AM Juany Barragan LCSW [facility]   3/7/2022 12:00 PM [facility] D[facility]A 1 [facility] DEXA None         Artemio Biggs MD     Please note that portions of this note were completed with a voice recognition program.

## 2022-01-03 ENCOUNTER — HOSPITAL ENCOUNTER (OUTPATIENT)
Dept: MAMMOGRAPHY | Facility: HOSPITAL | Age: 62
Discharge: HOME | End: 2022-01-03
Admitting: OBSTETRICS & GYNECOLOGY

## 2022-01-03 DIAGNOSIS — Z12.31 ENCOUNTER FOR SCREENING MAMMOGRAM FOR BREAST CANCER: ICD-10-CM

## 2022-01-03 PROCEDURE — 77063 BREAST TOMOSYNTHESIS BI: CPT

## 2022-01-03 PROCEDURE — 77067 SCR MAMMO BI INCL CAD: CPT

## 2022-01-04 ENCOUNTER — OFFICE VISIT (OUTPATIENT)
Dept: PAIN MEDICINE | Facility: CLINIC | Age: 62
End: 2022-01-04

## 2022-01-04 VITALS
DIASTOLIC BLOOD PRESSURE: 83 MMHG | HEIGHT: 60 IN | TEMPERATURE: 98.1 F | BODY MASS INDEX: 20.89 KG/M2 | WEIGHT: 106.4 LBS | OXYGEN SATURATION: 96 % | HEART RATE: 94 BPM | RESPIRATION RATE: 16 BRPM | SYSTOLIC BLOOD PRESSURE: 131 MMHG

## 2022-01-04 DIAGNOSIS — M47.816 LUMBAR FACET ARTHROPATHY: ICD-10-CM

## 2022-01-04 DIAGNOSIS — M81.0 OSTEOPOROSIS, UNSPECIFIED OSTEOPOROSIS TYPE, UNSPECIFIED PATHOLOGICAL FRACTURE PRESENCE: ICD-10-CM

## 2022-01-04 DIAGNOSIS — M51.16 LUMBAR DISC HERNIATION WITH RADICULOPATHY: Primary | ICD-10-CM

## 2022-01-04 DIAGNOSIS — F32.A ANXIETY AND DEPRESSION: ICD-10-CM

## 2022-01-04 DIAGNOSIS — F10.20 ALCOHOL DEPENDENCE, EPISODIC DRINKING BEHAVIOR: ICD-10-CM

## 2022-01-04 DIAGNOSIS — G47.09 OTHER INSOMNIA: ICD-10-CM

## 2022-01-04 DIAGNOSIS — F41.9 ANXIETY AND DEPRESSION: ICD-10-CM

## 2022-01-04 DIAGNOSIS — F12.90 MARIJUANA SMOKER, CONTINUOUS: ICD-10-CM

## 2022-01-04 DIAGNOSIS — M51.16 LUMBAR DISC HERNIATION WITH RADICULOPATHY: ICD-10-CM

## 2022-01-04 DIAGNOSIS — M48.062 LUMBAR STENOSIS WITH NEUROGENIC CLAUDICATION: ICD-10-CM

## 2022-01-04 PROCEDURE — 99204 OFFICE O/P NEW MOD 45 MIN: CPT | Performed by: ANESTHESIOLOGY

## 2022-01-05 ENCOUNTER — HOSPITAL ENCOUNTER (OUTPATIENT)
Dept: GENERAL RADIOLOGY | Facility: HOSPITAL | Age: 62
Discharge: HOME | End: 2022-01-05
Admitting: ANESTHESIOLOGY

## 2022-01-05 DIAGNOSIS — M51.16 LUMBAR DISC HERNIATION WITH RADICULOPATHY: ICD-10-CM

## 2022-01-05 PROCEDURE — 72120 X-RAY BEND ONLY L-S SPINE: CPT

## 2022-01-06 NOTE — TELEPHONE ENCOUNTER
Caller: Areli Barnett    Relationship: Self    Best call back number: [phone]    Caller requesting test results: SELF    What test was performed: MAMMOGRAM    When was the test performed: 11-    Where was the test performed: [facility]    Additional notes: WILL THE RESULTS AND IMAGING SHOW UP IN [facility]           oriented to person, place, time and situation/person/place

## 2022-01-18 ENCOUNTER — OFFICE VISIT (OUTPATIENT)
Dept: PSYCHIATRY | Facility: CLINIC | Age: 62
End: 2022-01-18

## 2022-01-18 VITALS
DIASTOLIC BLOOD PRESSURE: 70 MMHG | WEIGHT: 107 LBS | HEIGHT: 60 IN | HEART RATE: 91 BPM | BODY MASS INDEX: 21.01 KG/M2 | SYSTOLIC BLOOD PRESSURE: 124 MMHG

## 2022-01-18 DIAGNOSIS — G47.00 INSOMNIA, UNSPECIFIED TYPE: Chronic | ICD-10-CM

## 2022-01-18 DIAGNOSIS — F41.1 GENERALIZED ANXIETY DISORDER: Chronic | ICD-10-CM

## 2022-01-18 DIAGNOSIS — F32.1 CURRENT MODERATE EPISODE OF MAJOR DEPRESSIVE DISORDER, UNSPECIFIED WHETHER RECURRENT: Primary | Chronic | ICD-10-CM

## 2022-01-18 PROCEDURE — 99214 OFFICE O/P EST MOD 30 MIN: CPT | Performed by: NURSE PRACTITIONER

## 2022-01-18 RX ORDER — AMLODIPINE BESYLATE 5 MG/1
TABLET ORAL DAILY
COMMUNITY
Start: 2022-01-17 | End: 2022-04-07

## 2022-01-18 NOTE — PROGRESS NOTES
"Chief Complaint  Anxiety, Depression, and Sleeping Problem    Subjective     {CC  Encounters  Notes :23}     Areli Barnett presents to [facility] for medication management of her anxiety, depression, sleeping difficulties.    History of Present Illness: Patient presents today for follow-up appointment after last being seen for initial evaluation on 12/13/2021.  Patient reports today \"I am doing pretty good actually\".  She says she has been in a lot less physical pain, and is going to regular decompression treatments as well as physical therapy.  She has only only been taking 40 mg of Prozac.  She says the 60 mg was causing \"brain fog\".  Patient reports she has been taking the Wellbutrin daily.  She has only taken trazodone a handful of times.  She reports her sleep has continued to be a struggle.  Patient denies any new or significant issues with appetite.  Patient denies any SI/HI, A/V hallucinations.    Current Medications:   Current Outpatient Medications   Medication Sig Dispense Refill   • alendronate (FOSAMAX) 70 MG tablet Take 1 tablet by mouth Every 7 (Seven) Days. 12 tablet 3   • amLODIPine (NORVASC) 5 MG tablet      • atorvastatin (LIPITOR) 20 MG tablet Take 1 tablet by mouth every night at bedtime. 30 tablet 5   • buPROPion XL (Wellbutrin XL) 300 MG 24 hr tablet Take 1 tablet by mouth Daily. 30 tablet 2   • clotrimazole-betamethasone (LOTRISONE) 1-0.05 % cream Apply  topically to the appropriate area as directed 2 (Two) Times a Day. (Patient taking differently: Apply  topically to the appropriate area as directed 2 (Two) Times a Day. PRN) 15 g 0   • conjugated estrogens (Premarin) 0.625 MG/GM vaginal cream Use 0.5 grams intravaginally twice weekly to control symptoms. 1 each 11   • cyclobenzaprine (FLEXERIL) 10 MG tablet Take 1 tablet by mouth 3 (Three) Times a Day As Needed for Muscle Spasms. 30 tablet 2   • famotidine (PEPCID) 40 MG tablet TAKE ONE TABLET BY MOUTH " EVERY DAY AS NEEDED FOR indigestion OR heartburn 30 tablet 2   • FLUoxetine (PROzac) 20 MG capsule Take 3 capsules by mouth Daily. (Patient taking differently: Take 40 mg by mouth Daily.) 90 capsule 5   • meloxicam (MOBIC) 15 MG tablet 1 PO Daily with food. 90 tablet 1   • olopatadine (Patanol) 0.1 % ophthalmic solution Administer 1 drop to both eyes 2 (Two) Times a Day. (Patient taking differently: Administer 1 drop to both eyes 2 (Two) Times a Day. Once a day) 5 mL 5   • ondansetron ODT (ZOFRAN-ODT) 4 MG disintegrating tablet Place 1 tablet on the tongue Every 8 (Eight) Hours As Needed for Nausea or Vomiting. 30 tablet 1   • pantoprazole (PROTONIX) 40 MG EC tablet TAKE 1 TABLET BY MOUTH DAILY EVERY MORNING 30 MINUTES BEFORE BREAKFAST. Call for follow up appt for refills. 90 tablet 3   • traZODone (DESYREL) 50 MG tablet Take 1 tablet by mouth At Night As Needed for Sleep. for sleep 90 tablet 3   • tretinoin (Retin-A) 0.05 % cream Apply  topically to the appropriate area as directed At Night As Needed (acne). (Patient taking differently: Apply  topically to the appropriate area as directed At Night As Needed (acne). Every night) 20 g 2   • fluticasone (FLONASE) 50 MCG/ACT nasal spray 1 spray into the nostril(s) as directed by provider 2 (two) times a day. SHAKE LIQUID AND INSTILL 2 SPRAYS IN EACH NOSTRIL EVERY DAY (Patient taking differently: 1 spray into the nostril(s) as directed by provider 2 (two) times a day. SHAKE LIQUID AND INSTILL 2 SPRAYS IN EACH NOSTRIL EVERY DAY PRN) 48 g 3     No current facility-administered medications for this visit.       Mental Status Exam:   Hygiene:   good  Cooperation:  Cooperative  Eye Contact:  Good  Psychomotor Behavior:  Appropriate  Affect:  Appropriate  Mood: euthymic  Speech:  Normal  Thought Process:  Goal directed  Thought Content:  Mood congruent  Suicidal:  None  Homicidal:  None  Hallucinations:  None  Delusion:  None  Memory:  Intact  Orientation:  Person, Place,  "Time and Situation  Reliability:  fair  Insight:  Fair  Judgement:  Good  Impulse Control:  Good  Physical/Medical Issues:  HLD, GERD       Objective   Vital Signs:   /70   Pulse 91   Ht 152.4 cm (60\")   Wt 48.5 kg (107 lb)   BMI 20.90 kg/m²     Physical Exam  Neurological:      Mental Status: She is oriented to person, place, and time. Mental status is at baseline.      Coordination: Coordination is intact.      Gait: Gait is intact.   Psychiatric:         Behavior: Behavior is cooperative.        Result Review :     The following data was reviewed by: JAI Gillette on 01/18/2022:    Data reviewed: Previous note, medication history          Assessment and Plan    Problem List Items Addressed This Visit        Mental Health    Depressed - Primary      Other Visit Diagnoses     Generalized anxiety disorder  (Chronic)       Insomnia, unspecified type  (Chronic)             PHQ-9 Score:   PHQ-9 Total Score: 7    Depression Screening:  Patient screened positive for depression based on a PHQ-9 score of 7 on 1/18/2022. Follow-up recommendations include: Prescribed antidepressant medication treatment and Suicide Risk Assessment performed.      Tobacco Cessation:  Patient is a former tobacco user. No tobacco cessation education necessary.      Impression/Plan:  -This is my first follow-up appointment with the patient.  Patient presents today and reports she has been doing much better than she was at her initial evaluation.  She has been taking her medication daily, although has not been taking the full dose of the Prozac.  She says the 60 mg was too much, and she is doing well with the 40 mg and would prefer to take that.  Patient has not been using the trazodone very consistently.  She says her sleep has not been very good over the last several weeks.  Advised her to try taking the trazodone on a more consistent basis because when she has taken it, she reports it has helped.  -Maintain Wellbutrin  mg " daily.  -Decrease Prozac to 40 mg daily.  -Maintain trazodone 50 mg nightly.  -Schedule follow-up for 1 month or as needed.    MEDS ORDERED DURING VISIT:  No orders of the defined types were placed in this encounter.        Follow Up   Return in about 1 month (around 2/18/2022), or if symptoms worsen or fail to improve, for Next scheduled follow up.  Patient was given instructions and counseling regarding her condition or for health maintenance advice. Please see specific information pulled into the AVS if appropriate.       TREATMENT PLAN/GOALS: Continue supportive psychotherapy efforts and medications as indicated. Treatment and medication options discussed during today's visit. Patient acknowledged and verbally consented to continue with current treatment plan and was educated on the importance of compliance with treatment and follow-up appointments.    MEDICATION ISSUES:  Discussed medication options and treatment plan of prescribed medication as well as the risks, benefits, and side effects including potential falls, possible impaired driving and metabolic adversities among others. Patient is agreeable to call the office with any worsening of symptoms or onset of side effects. Patient is agreeable to call 911 or go to the nearest ER should he/she begin having SI/HI.          This document has been electronically signed by JAI Scott, PMHNP-BC  January 18, 2022 10:52 EST      Part of this note may be an electronic transcription/translation of spoken language to printed text using the Dragon Dictation System.

## 2022-01-24 ENCOUNTER — OUTSIDE FACILITY SERVICE (OUTPATIENT)
Dept: PAIN MEDICINE | Facility: CLINIC | Age: 62
End: 2022-01-24

## 2022-01-24 PROCEDURE — 64483 NJX AA&/STRD TFRM EPI L/S 1: CPT | Performed by: ANESTHESIOLOGY

## 2022-01-24 PROCEDURE — 99152 MOD SED SAME PHYS/QHP 5/>YRS: CPT | Performed by: ANESTHESIOLOGY

## 2022-01-24 PROCEDURE — 64484 NJX AA&/STRD TFRM EPI L/S EA: CPT | Performed by: ANESTHESIOLOGY

## 2022-01-25 ENCOUNTER — TELEPHONE (OUTPATIENT)
Dept: PAIN MEDICINE | Facility: CLINIC | Age: 62
End: 2022-01-25

## 2022-01-25 NOTE — TELEPHONE ENCOUNTER
Spoke with pt regarding yesterday’s procedure with Dr. Biggs. Pt stated they are doing well, with no complaints. Advised of f/u appointment. Pt understood, and no further needs expressed

## 2022-01-26 ENCOUNTER — OFFICE VISIT (OUTPATIENT)
Dept: INTERNAL MEDICINE | Facility: CLINIC | Age: 62
End: 2022-01-26

## 2022-01-26 VITALS
DIASTOLIC BLOOD PRESSURE: 64 MMHG | HEIGHT: 60 IN | SYSTOLIC BLOOD PRESSURE: 100 MMHG | BODY MASS INDEX: 21.01 KG/M2 | HEART RATE: 84 BPM | WEIGHT: 107 LBS | OXYGEN SATURATION: 98 % | TEMPERATURE: 98.2 F

## 2022-01-26 DIAGNOSIS — I10 ESSENTIAL HYPERTENSION: Primary | Chronic | ICD-10-CM

## 2022-01-26 DIAGNOSIS — M51.16 LUMBAR DISC HERNIATION WITH RADICULOPATHY: Chronic | ICD-10-CM

## 2022-01-26 PROCEDURE — 99214 OFFICE O/P EST MOD 30 MIN: CPT | Performed by: STUDENT IN AN ORGANIZED HEALTH CARE EDUCATION/TRAINING PROGRAM

## 2022-01-26 NOTE — PROGRESS NOTES
"Chief Complaint  Areli Barnett is a 62 y.o. female presenting for Hypertension (f/u) and Pain (lower buttocks  also pain and tingling in right leg).      Patient has a past medical history of allergic rhinitis, hyperlipidemia, hypertension, palpitations, GERD, esophageal stricture, Helicobacter pylori, anxiety, depression, osteoporosis, osteoarthritis including degenerative changes of the spine right L4-5 R radiulopathy.    History of Present Illness  Patient has here for follow-up.  Overall patient states that she is feeling a lot better, her pain has significantly improved, she feels happy.    She has been taking her blood pressure medication consistently.  She has noted any lightheadedness or dizziness, but occasional mild sweating when she stands up.    Patient had epidural injection on the right side 2 days ago, she is still having some tingling around her right gluteal area, but she feels symptoms now are improving.    She has appointment tomorrow with podiatrist for her foot problems.    The following portions of the patient's history were reviewed and updated as appropriate: allergies, current medications, past family history, past medical history, past social history, past surgical history and problem list.    Objective  /64 (BP Location: Left arm, Patient Position: Sitting, Cuff Size: Adult)   Pulse 84   Temp 98.2 °F (36.8 °C) (Temporal)   Ht 152.4 cm (60\")   Wt 48.5 kg (107 lb)   LMP  (LMP Unknown)   SpO2 98%   BMI 20.90 kg/m²     Physical Exam  Vitals reviewed.   Constitutional:       Appearance: Normal appearance.   HENT:      Head: Normocephalic and atraumatic.      Nose: No congestion.   Eyes:      Extraocular Movements: Extraocular movements intact.      Conjunctiva/sclera: Conjunctivae normal.   Cardiovascular:      Rate and Rhythm: Normal rate and regular rhythm.      Heart sounds: Normal heart sounds. No murmur heard.      Pulmonary:      Effort: Pulmonary effort is normal.      " Breath sounds: Normal breath sounds.   Musculoskeletal:      Cervical back: Neck supple.      Right lower leg: No edema.      Left lower leg: No edema.   Skin:     General: Skin is warm and dry.   Neurological:      Mental Status: She is alert and oriented to person, place, and time. Mental status is at baseline.   Psychiatric:         Behavior: Behavior normal.         Thought Content: Thought content normal.         Assessment/Plan   1. Essential hypertension  BP Readings from Last 3 Encounters:   01/26/22 100/64   01/18/22 124/70   01/04/22 131/83   No clear orthostatic symptoms, her blood pressure is fairly low.  It is possible that she is having some sweating in relation to low blood pressure, but her symptoms are fairly mild at this time, so we will continue on current medication with amlodipine 5 mg.  If her symptoms would get worse I would consider reducing her dose.    2. Lumbar disc herniation with radiculopathy  Symptoms appear to be improving.  Patient will continue follow-up with pain management.      Return in about 4 months (around 5/26/2022) for Recheck.    Future Appointments       Provider Department Center    2/18/2022 11:00 AM Ricardo Matt APRN [facility]    2/21/2022 11:00 AM Juany Barragan LCSW [facility]    2/23/2022 11:00 AM [facility] NEURODIAGNOSTIC ROOM 3 [facility] NEUROLOGY DIAGNOSTICS [facility]    3/7/2022 12:00 PM RENALDO DEXA 1 [facility] DEXA     3/21/2022 9:30 AM Kendra Elder APRN [facility] PAIN MANAGEMENT [facility]    5/26/2022 11:30 AM Salvatore Townsend MD [facility] INTERNAL MEDICINE [facility]            Salvatore Townsend MD  Family Medicine  01/26/2022

## 2022-02-18 ENCOUNTER — OFFICE VISIT (OUTPATIENT)
Dept: PSYCHIATRY | Facility: CLINIC | Age: 62
End: 2022-02-18

## 2022-02-18 VITALS
WEIGHT: 106 LBS | HEIGHT: 60 IN | SYSTOLIC BLOOD PRESSURE: 104 MMHG | BODY MASS INDEX: 20.81 KG/M2 | DIASTOLIC BLOOD PRESSURE: 64 MMHG | HEART RATE: 84 BPM

## 2022-02-18 DIAGNOSIS — F41.1 GENERALIZED ANXIETY DISORDER: Chronic | ICD-10-CM

## 2022-02-18 DIAGNOSIS — F32.1 CURRENT MODERATE EPISODE OF MAJOR DEPRESSIVE DISORDER, UNSPECIFIED WHETHER RECURRENT: Primary | Chronic | ICD-10-CM

## 2022-02-18 DIAGNOSIS — G47.09 OTHER INSOMNIA: Chronic | ICD-10-CM

## 2022-02-18 PROCEDURE — 99214 OFFICE O/P EST MOD 30 MIN: CPT | Performed by: NURSE PRACTITIONER

## 2022-02-18 RX ORDER — BUPROPION HYDROCHLORIDE 300 MG/1
300 TABLET ORAL DAILY
Qty: 90 TABLET | Refills: 1 | Status: SHIPPED | OUTPATIENT
Start: 2022-02-18 | End: 2022-09-06

## 2022-02-18 NOTE — PROGRESS NOTES
"Chief Complaint  Anxiety, Depression, and Sleeping Problem    Subjective          Areli Barnett presents to [facility] for medication management of her anxiety, depression, sleeping difficulties.    History of Present Illness: Patient presents today for follow-up appointment after last being seen on 01/18/2021.  Patient reports today \"I am doing well.  It is all good\".  She says she is taking her medications regularly.  She says she has been very busy doing things around her house, and has joined the [employer].  She says she has been going there regularly to swim and participate in aquatic exercises.  She says her back is been feeling a lot better, and feels the time she is spending in the pool is helping it.  She says it has actually been feeling so well she has been taking walks regularly as well.  Patient says her sleep has been good, and she has no complaints or issues there.  She feels her mood and anxiety have both been well controlled, and is happy with how well she is doing.  Patient denies any SI/HI, A/V hallucinations.    Current Medications:   Current Outpatient Medications   Medication Sig Dispense Refill   • alendronate (FOSAMAX) 70 MG tablet Take 1 tablet by mouth Every 7 (Seven) Days. (Patient taking differently: Take 70 mg by mouth Every 7 (Seven) Days. When remembered) 12 tablet 3   • amLODIPine (NORVASC) 5 MG tablet Daily.     • atorvastatin (LIPITOR) 20 MG tablet Take 1 tablet by mouth every night at bedtime. 30 tablet 5   • buPROPion XL (Wellbutrin XL) 300 MG 24 hr tablet Take 1 tablet by mouth Daily. 90 tablet 1   • clotrimazole-betamethasone (LOTRISONE) 1-0.05 % cream Apply  topically to the appropriate area as directed 2 (Two) Times a Day. (Patient taking differently: Apply  topically to the appropriate area as directed 2 (Two) Times a Day. PRN) 15 g 0   • conjugated estrogens (Premarin) 0.625 MG/GM vaginal cream Use 0.5 grams intravaginally twice weekly to " control symptoms. 1 each 11   • cyclobenzaprine (FLEXERIL) 10 MG tablet Take 1 tablet by mouth 3 (Three) Times a Day As Needed for Muscle Spasms. 30 tablet 2   • famotidine (PEPCID) 40 MG tablet TAKE ONE TABLET BY MOUTH EVERY DAY AS NEEDED FOR indigestion OR heartburn 30 tablet 2   • FLUoxetine (PROzac) 20 MG capsule Take 3 capsules by mouth Daily. (Patient taking differently: Take 40 mg by mouth Daily.) 90 capsule 5   • fluticasone (FLONASE) 50 MCG/ACT nasal spray 1 spray into the nostril(s) as directed by provider 2 (two) times a day. SHAKE LIQUID AND INSTILL 2 SPRAYS IN EACH NOSTRIL EVERY DAY (Patient taking differently: 1 spray into the nostril(s) as directed by provider 2 (two) times a day. SHAKE LIQUID AND INSTILL 2 SPRAYS IN EACH NOSTRIL EVERY DAY PRN) 48 g 3   • meloxicam (MOBIC) 15 MG tablet 1 PO Daily with food. 90 tablet 1   • olopatadine (Patanol) 0.1 % ophthalmic solution Administer 1 drop to both eyes 2 (Two) Times a Day. (Patient taking differently: Administer 1 drop to both eyes 2 (Two) Times a Day. Once a day) 5 mL 5   • ondansetron ODT (ZOFRAN-ODT) 4 MG disintegrating tablet Place 1 tablet on the tongue Every 8 (Eight) Hours As Needed for Nausea or Vomiting. 30 tablet 1   • pantoprazole (PROTONIX) 40 MG EC tablet TAKE 1 TABLET BY MOUTH DAILY EVERY MORNING 30 MINUTES BEFORE BREAKFAST. Call for follow up appt for refills. 90 tablet 3   • traZODone (DESYREL) 50 MG tablet Take 1 tablet by mouth At Night As Needed for Sleep. for sleep 90 tablet 3   • tretinoin (Retin-A) 0.05 % cream Apply  topically to the appropriate area as directed At Night As Needed (acne). (Patient taking differently: Apply  topically to the appropriate area as directed At Night As Needed (acne). Every night) 20 g 2     No current facility-administered medications for this visit.       Mental Status Exam:   Hygiene:   good  Cooperation:  Cooperative  Eye Contact:  Good  Psychomotor Behavior:  Appropriate  Affect:   "Appropriate  Mood: euthymic  Speech:  Normal  Thought Process:  Goal directed  Thought Content:  Mood congruent  Suicidal:  None  Homicidal:  None  Hallucinations:  None  Delusion:  None  Memory:  Intact  Orientation:  Person, Place, Time and Situation  Reliability:  good  Insight:  Good  Judgement:  Good  Impulse Control:  Good  Physical/Medical Issues:  HLD, GERD, plantar fasciitis        Objective   Vital Signs:   /64   Pulse 84   Ht 152.4 cm (60\")   Wt 48.1 kg (106 lb)   BMI 20.70 kg/m²     Physical Exam  Neurological:      Mental Status: She is oriented to person, place, and time. Mental status is at baseline.      Coordination: Coordination is intact.      Gait: Gait is intact.   Psychiatric:         Behavior: Behavior is cooperative.        Result Review :     The following data was reviewed by: JAI Gillette on 02/18/2022:    Data reviewed: Previous note, medication history          Assessment and Plan    Problem List Items Addressed This Visit        Mental Health    Depressed - Primary    Relevant Medications    buPROPion XL (Wellbutrin XL) 300 MG 24 hr tablet       Sleep    Other insomnia      Other Visit Diagnoses     Generalized anxiety disorder  (Chronic)       Relevant Medications    buPROPion XL (Wellbutrin XL) 300 MG 24 hr tablet          PHQ-9 Score:   PHQ-9 Total Score: 0    Depression Screening:  Patient screened positive for depression based on a PHQ-9 score of 0 on 2/18/2022. Follow-up recommendations include: Prescribed antidepressant medication treatment and Suicide Risk Assessment performed.      Tobacco Cessation:  Patient is a former tobacco user. No tobacco cessation education necessary.      Impression/Plan:  -This is a follow-up appointment.  Patient presents today and reports she has been doing well since her last appointment.  She endorses taking her medication regularly as prescribed, and denies any adverse effects associated with them.  Patient says she feels she is " doing very well, and is happy with the progress she has made.  -Maintain Wellbutrin  mg daily.  -Maintain Prozac 40 mg daily.  -Maintain trazodone 50 mg nightly.  -Patient has upcoming initial therapy appointment next week.  Encouraged patient to make sure she keeps that appointment.  -Schedule follow-up for 2 months or as needed.    MEDS ORDERED DURING VISIT:  New Medications Ordered This Visit   Medications   • buPROPion XL (Wellbutrin XL) 300 MG 24 hr tablet     Sig: Take 1 tablet by mouth Daily.     Dispense:  90 tablet     Refill:  1         Follow Up   Return in about 2 months (around 4/18/2022), or if symptoms worsen or fail to improve, for Next scheduled follow up.  Patient was given instructions and counseling regarding her condition or for health maintenance advice. Please see specific information pulled into the AVS if appropriate.       TREATMENT PLAN/GOALS: Continue supportive psychotherapy efforts and medications as indicated. Treatment and medication options discussed during today's visit. Patient acknowledged and verbally consented to continue with current treatment plan and was educated on the importance of compliance with treatment and follow-up appointments.    MEDICATION ISSUES:  Discussed medication options and treatment plan of prescribed medication as well as the risks, benefits, and side effects including potential falls, possible impaired driving and metabolic adversities among others. Patient is agreeable to call the office with any worsening of symptoms or onset of side effects. Patient is agreeable to call 911 or go to the nearest ER should he/she begin having SI/HI.          This document has been electronically signed by JAI Scott, PMHNP-BC  February 18, 2022 11:12 EST      Part of this note may be an electronic transcription/translation of spoken language to printed text using the Dragon Dictation System.

## 2022-02-21 ENCOUNTER — OFFICE VISIT (OUTPATIENT)
Dept: PSYCHIATRY | Facility: CLINIC | Age: 62
End: 2022-02-21

## 2022-02-21 DIAGNOSIS — F41.1 GENERALIZED ANXIETY DISORDER: Primary | ICD-10-CM

## 2022-02-21 PROCEDURE — 90837 PSYTX W PT 60 MINUTES: CPT | Performed by: SOCIAL WORKER

## 2022-02-23 ENCOUNTER — HOSPITAL ENCOUNTER (OUTPATIENT)
Dept: NEUROLOGY | Facility: HOSPITAL | Age: 62
Discharge: HOME | End: 2022-02-23
Admitting: ANESTHESIOLOGY

## 2022-02-23 DIAGNOSIS — M51.16 LUMBAR DISC HERNIATION WITH RADICULOPATHY: ICD-10-CM

## 2022-02-23 PROCEDURE — 95886 MUSC TEST DONE W/N TEST COMP: CPT | Performed by: PSYCHIATRY & NEUROLOGY

## 2022-02-23 PROCEDURE — 95886 MUSC TEST DONE W/N TEST COMP: CPT

## 2022-02-23 PROCEDURE — 95911 NRV CNDJ TEST 9-10 STUDIES: CPT | Performed by: PSYCHIATRY & NEUROLOGY

## 2022-02-23 PROCEDURE — 95911 NRV CNDJ TEST 9-10 STUDIES: CPT

## 2022-02-23 NOTE — PROGRESS NOTES
Date: February 21, 2022  Time In: 11:00 am   Time Out: 12:00 pm       PROGRESS NOTE  Data:  Areli Barnett is a 62 y.o. female who presents today for individual therapy session at [facility].     Patient appears visually anxious during session. Patient tells me that she has significant anxiety that has gone on for many years. She tells me that she has significant back pain. Patient tells me that she is not currently working, and lives with her boyfriend who works long hours as a . She tells me that she would like to start a hobby but is unsure what will help her back pain. Patient explains that she is unsure if she needs therapy at this time.       Clinical Maneuvering/Intervention:    Allowed patient to freely discuss issues without interruption or judgment. Provided safe, confidential environment to facilitate the development of positive therapeutic relationship and encourage open, honest communication. Assisted patient in identifying risk factors which would indicate the need for higher level of care including thoughts to harm self or others and/or self-harming behavior and encouraged patient to contact this office, call 911, or present to the nearest emergency room should any of these events occur. Discussed crisis intervention services and means to access. Patient adamantly and convincingly denies current suicidal or homicidal ideation or perceptual disturbance.    Assessment   Patient appears to maintain relative stability as compared to their baseline.  However, patient continues to struggle with anxiety which continues to cause impairment in important areas of functioning.  A result, they can be reasonably expected to continue to benefit from treatment and would likely be at increased risk for decompensation otherwise.    Goals for Treatment: Reduce anxiety    Mental Status Exam:   Hygiene:   good  Cooperation:  Cooperative  Eye Contact:  Good  Psychomotor Behavior:   Appropriate  Affect:  Full range  Mood: normal  Hopelessness: Denies  Speech:  Normal  Thought Process:  Goal directed  Thought Content:  Normal  Suicidal:  None  Homicidal:  None  Hallucinations:  None  Delusion:  None  Memory:  Intact  Orientation:  Person, Place and Time  Reliability:  good  Insight:  Good  Judgement:  Good  Impulse Control:  Good        Patient's Support Network Includes:  significant other    Functional Status: Moderate impairment     Progress toward goal: Not at goal    Prognosis: Good with Ongoing Treatment          Plan     Patient will continue in individual outpatient therapy with focus on improved functioning and coping skills, maintaining stability, and avoiding decompensation and the need for higher level of care.    Patient will adhere to medication regimen as prescribed and report any side effects. Patient will contact this office, call 911 or present to the nearest emergency room should suicidal or homicidal ideations occur. Provide Cognitive Behavioral Therapy and Solution Focused Therapy to improve functioning, maintain stability, and avoid decompensation and the need for higher level of care.     Return in about 2 weeks, or earlier if symptoms worsen or fail to improve.           VISIT DIAGNOSIS:     ICD-10-CM ICD-9-CM   1. Generalized anxiety disorder  F41.1 300.02             This document has been electronically signed by Juany Barragan LCSW  February 23, 2022 13:12 EST      Part of this note may be an electronic transcription/translation of spoken language to printed text using the Dragon Dictation System.

## 2022-03-07 ENCOUNTER — APPOINTMENT (OUTPATIENT)
Dept: BONE DENSITY | Facility: HOSPITAL | Age: 62
End: 2022-03-07

## 2022-03-08 ENCOUNTER — APPOINTMENT (OUTPATIENT)
Dept: BONE DENSITY | Facility: HOSPITAL | Age: 62
End: 2022-03-08

## 2022-03-08 DIAGNOSIS — M81.0 OSTEOPOROSIS, UNSPECIFIED OSTEOPOROSIS TYPE, UNSPECIFIED PATHOLOGICAL FRACTURE PRESENCE: ICD-10-CM

## 2022-03-08 PROCEDURE — 77080 DXA BONE DENSITY AXIAL: CPT

## 2022-04-07 RX ORDER — AMLODIPINE BESYLATE 5 MG/1
TABLET ORAL
Qty: 30 TABLET | Refills: 3 | Status: SHIPPED | OUTPATIENT
Start: 2022-04-07 | End: 2022-08-17

## 2022-05-26 ENCOUNTER — OFFICE VISIT (OUTPATIENT)
Dept: INTERNAL MEDICINE | Facility: CLINIC | Age: 62
End: 2022-05-26

## 2022-05-26 ENCOUNTER — LAB (OUTPATIENT)
Dept: LAB | Facility: HOSPITAL | Age: 62
End: 2022-05-26

## 2022-05-26 VITALS
TEMPERATURE: 97.8 F | HEART RATE: 68 BPM | HEIGHT: 60 IN | DIASTOLIC BLOOD PRESSURE: 74 MMHG | BODY MASS INDEX: 21.91 KG/M2 | WEIGHT: 111.6 LBS | SYSTOLIC BLOOD PRESSURE: 124 MMHG

## 2022-05-26 DIAGNOSIS — F33.1 MODERATE EPISODE OF RECURRENT MAJOR DEPRESSIVE DISORDER: ICD-10-CM

## 2022-05-26 DIAGNOSIS — I10 ESSENTIAL HYPERTENSION: Chronic | ICD-10-CM

## 2022-05-26 DIAGNOSIS — M51.16 LUMBAR DISC HERNIATION WITH RADICULOPATHY: Primary | Chronic | ICD-10-CM

## 2022-05-26 DIAGNOSIS — B35.4 TINEA CORPORIS: ICD-10-CM

## 2022-05-26 PROCEDURE — 99214 OFFICE O/P EST MOD 30 MIN: CPT | Performed by: STUDENT IN AN ORGANIZED HEALTH CARE EDUCATION/TRAINING PROGRAM

## 2022-05-26 RX ORDER — CLOTRIMAZOLE AND BETAMETHASONE DIPROPIONATE 10; .64 MG/G; MG/G
CREAM TOPICAL 2 TIMES DAILY
Qty: 15 G | Refills: 0 | Status: SHIPPED | OUTPATIENT
Start: 2022-05-26

## 2022-05-26 RX ORDER — CETIRIZINE HYDROCHLORIDE 10 MG/1
10 TABLET ORAL
COMMUNITY
Start: 2022-05-11 | End: 2022-07-25

## 2022-05-26 RX ORDER — FLUOXETINE HYDROCHLORIDE 20 MG/1
40 CAPSULE ORAL DAILY
Qty: 60 CAPSULE | Refills: 5 | COMMUNITY
Start: 2022-05-26 | End: 2022-08-29

## 2022-05-26 NOTE — PROGRESS NOTES
"Chief Complaint  Areli Barnett is a 62 y.o. female presenting for Hypertension (F/u ) and Pain (Right hip and leg ).     Patient has a past medical history of allergic rhinitis, hyperlipidemia, hypertension, palpitations, GERD, esophageal stricture, Helicobacter pylori, anxiety, depression, osteoporosis, osteoarthritis including degenerative changes of the spine with right L4-5 R severe foraminal stenosis w/radiulopathy.    History of Present Illness  Patient is here for follow-up.    She is still experiencing significant pain upper left leg.  She was initially evaluated by Ortho, she has been seen by pain management, they have given her injections, she is still doing physical therapy but not improving much.  She would like to see neurosurgery for evaluation and possible intervention..    Patient is otherwise doing fairly well.  She has reduced her dose of Prozac from 60 to 40 mg and is doing well.    Patient has been using pantoprazole 40 mg for her reflux, also occasionally using famotidine if she forgets the pantoprazole.  He is amenable to find without pantoprazole.    Patient also has missed a few doses of Fosamax.  I have went over results of her DEXA with her.    The following portions of the patient's history were reviewed and updated as appropriate: allergies, current medications, past family history, past medical history, past social history, past surgical history and problem list.    Objective  /74 (BP Location: Left arm, Patient Position: Sitting, Cuff Size: Adult)   Pulse 68   Temp 97.8 °F (36.6 °C) (Temporal)   Ht 152.5 cm (60.04\")   Wt 50.6 kg (111 lb 9.6 oz)   LMP  (LMP Unknown)   BMI 21.77 kg/m²     Physical Exam  Vitals reviewed.   Constitutional:       Appearance: Normal appearance.   HENT:      Head: Normocephalic and atraumatic.      Nose: No congestion.   Eyes:      Extraocular Movements: Extraocular movements intact.      Conjunctiva/sclera: Conjunctivae normal.   Cardiovascular: "      Rate and Rhythm: Normal rate and regular rhythm.      Heart sounds: Normal heart sounds. No murmur heard.  Pulmonary:      Effort: Pulmonary effort is normal.      Breath sounds: Normal breath sounds.   Musculoskeletal:      Cervical back: Neck supple.      Right lower leg: No edema.      Left lower leg: No edema.   Skin:     General: Skin is warm and dry.   Neurological:      Mental Status: She is alert and oriented to person, place, and time. Mental status is at baseline.   Psychiatric:         Behavior: Behavior normal.         Thought Content: Thought content normal.         Assessment/Plan   1. Lumbar disc herniation with radiculopathy  Patient has tried different modalities, still not improving.  MRI done 2021.  I would prefer for evaluation by neurosurgery.  - Ambulatory Referral to Neurosurgery    2. Essential hypertension  BP Readings from Last 3 Encounters:   05/26/22 124/74   02/18/22 104/64   01/26/22 100/64   Blood pressure well controlled.  Continue on amlodipine 5 mg  - Comprehensive Metabolic Panel; Future    3. Moderate episode of recurrent major depressive disorder (HCC)  Overall stable.  Continue on current medication.  - FLUoxetine (PROzac) 20 MG capsule; Take 2 capsules by mouth Daily.  Dispense: 60 capsule; Refill: 5    4. Tinea corporis  - clotrimazole-betamethasone (LOTRISONE) 1-0.05 % cream; Apply  topically to the appropriate area as directed 2 (Two) Times a Day.  Dispense: 15 g; Refill: 0    BMI is within normal parameters. No other follow-up for BMI required.    Return in about 5 months (around 10/26/2022) for Annual physical.    Future Appointments       Provider Department Center    10/25/2022 10:45 AM Salvatore Townsend MD [facility] INTERNAL MEDICINE [facility]            Salvatore Townsend MD  Family Medicine  05/26/2022

## 2022-05-27 LAB
ALBUMIN SERPL-MCNC: 4.7 G/DL (ref 3.5–5.2)
ALBUMIN/GLOB SERPL: 2.9 G/DL
ALP SERPL-CCNC: 49 U/L (ref 39–117)
ALT SERPL-CCNC: 20 U/L (ref 1–33)
AST SERPL-CCNC: 26 U/L (ref 1–32)
BILIRUB SERPL-MCNC: 0.3 MG/DL (ref 0–1.2)
BUN SERPL-MCNC: 13 MG/DL (ref 8–23)
BUN/CREAT SERPL: 19.1 (ref 7–25)
CALCIUM SERPL-MCNC: 9.3 MG/DL (ref 8.6–10.5)
CHLORIDE SERPL-SCNC: 101 MMOL/L (ref 98–107)
CO2 SERPL-SCNC: 24.8 MMOL/L (ref 22–29)
CREAT SERPL-MCNC: 0.68 MG/DL (ref 0.57–1)
EGFRCR SERPLBLD CKD-EPI 2021: 99.8 ML/MIN/1.73
GLOBULIN SER CALC-MCNC: 1.6 GM/DL
GLUCOSE SERPL-MCNC: 81 MG/DL (ref 65–99)
POTASSIUM SERPL-SCNC: 4.5 MMOL/L (ref 3.5–5.2)
PROT SERPL-MCNC: 6.3 G/DL (ref 6–8.5)
SODIUM SERPL-SCNC: 139 MMOL/L (ref 136–145)

## 2022-06-01 DIAGNOSIS — M47.816 LUMBAR SPONDYLOSIS: ICD-10-CM

## 2022-06-01 RX ORDER — MELOXICAM 15 MG/1
TABLET ORAL
Qty: 90 TABLET | Refills: 1 | Status: SHIPPED | OUTPATIENT
Start: 2022-06-01 | End: 2022-11-23

## 2022-06-19 ENCOUNTER — HOSPITAL ENCOUNTER (EMERGENCY)
Facility: HOSPITAL | Age: 62
Discharge: HOME | End: 2022-06-20
Attending: EMERGENCY MEDICINE | Admitting: EMERGENCY MEDICINE

## 2022-06-19 ENCOUNTER — APPOINTMENT (OUTPATIENT)
Dept: CT IMAGING | Facility: HOSPITAL | Age: 62
End: 2022-06-19

## 2022-06-19 DIAGNOSIS — R10.9 ABDOMINAL PAIN, UNSPECIFIED ABDOMINAL LOCATION: Primary | ICD-10-CM

## 2022-06-19 LAB
ALBUMIN SERPL-MCNC: 4.7 G/DL (ref 3.5–5.2)
ALBUMIN/GLOB SERPL: 2 G/DL
ALP SERPL-CCNC: 53 U/L (ref 39–117)
ALT SERPL W P-5'-P-CCNC: 21 U/L (ref 1–33)
ANION GAP SERPL CALCULATED.3IONS-SCNC: 13.7 MMOL/L (ref 5–15)
AST SERPL-CCNC: 24 U/L (ref 1–32)
BASOPHILS # BLD AUTO: 0.1 10*3/MM3 (ref 0–0.2)
BASOPHILS NFR BLD AUTO: 0.9 % (ref 0–1.5)
BILIRUB SERPL-MCNC: 0.2 MG/DL (ref 0–1.2)
BILIRUB UR QL STRIP: NEGATIVE
BUN SERPL-MCNC: 17 MG/DL (ref 8–23)
BUN/CREAT SERPL: 23 (ref 7–25)
CALCIUM SPEC-SCNC: 9.5 MG/DL (ref 8.6–10.5)
CHLORIDE SERPL-SCNC: 92 MMOL/L (ref 98–107)
CLARITY UR: ABNORMAL
CO2 SERPL-SCNC: 21.3 MMOL/L (ref 22–29)
COLOR UR: YELLOW
CREAT SERPL-MCNC: 0.74 MG/DL (ref 0.57–1)
DEPRECATED RDW RBC AUTO: 40.5 FL (ref 37–54)
EGFRCR SERPLBLD CKD-EPI 2021: 92.8 ML/MIN/1.73
EOSINOPHIL # BLD AUTO: 0.05 10*3/MM3 (ref 0–0.4)
EOSINOPHIL NFR BLD AUTO: 0.4 % (ref 0.3–6.2)
ERYTHROCYTE [DISTWIDTH] IN BLOOD BY AUTOMATED COUNT: 12 % (ref 12.3–15.4)
GLOBULIN UR ELPH-MCNC: 2.3 GM/DL
GLUCOSE SERPL-MCNC: 114 MG/DL (ref 65–99)
GLUCOSE UR STRIP-MCNC: NEGATIVE MG/DL
HCT VFR BLD AUTO: 35.3 % (ref 34–46.6)
HGB BLD-MCNC: 12.3 G/DL (ref 12–15.9)
HGB UR QL STRIP.AUTO: NEGATIVE
IMM GRANULOCYTES # BLD AUTO: 0.04 10*3/MM3 (ref 0–0.05)
IMM GRANULOCYTES NFR BLD AUTO: 0.3 % (ref 0–0.5)
KETONES UR QL STRIP: ABNORMAL
LEUKOCYTE ESTERASE UR QL STRIP.AUTO: NEGATIVE
LIPASE SERPL-CCNC: 48 U/L (ref 13–60)
LYMPHOCYTES # BLD AUTO: 1.53 10*3/MM3 (ref 0.7–3.1)
LYMPHOCYTES NFR BLD AUTO: 13.3 % (ref 19.6–45.3)
MCH RBC QN AUTO: 31.5 PG (ref 26.6–33)
MCHC RBC AUTO-ENTMCNC: 34.8 G/DL (ref 31.5–35.7)
MCV RBC AUTO: 90.5 FL (ref 79–97)
MONOCYTES # BLD AUTO: 0.65 10*3/MM3 (ref 0.1–0.9)
MONOCYTES NFR BLD AUTO: 5.7 % (ref 5–12)
NEUTROPHILS NFR BLD AUTO: 79.4 % (ref 42.7–76)
NEUTROPHILS NFR BLD AUTO: 9.13 10*3/MM3 (ref 1.7–7)
NITRITE UR QL STRIP: NEGATIVE
NRBC BLD AUTO-RTO: 0 /100 WBC (ref 0–0.2)
PH UR STRIP.AUTO: 8 [PH] (ref 5–8)
PLATELET # BLD AUTO: 282 10*3/MM3 (ref 140–450)
PMV BLD AUTO: 8.4 FL (ref 6–12)
POTASSIUM SERPL-SCNC: 4.1 MMOL/L (ref 3.5–5.2)
PROT SERPL-MCNC: 7 G/DL (ref 6–8.5)
PROT UR QL STRIP: NEGATIVE
RBC # BLD AUTO: 3.9 10*6/MM3 (ref 3.77–5.28)
SODIUM SERPL-SCNC: 127 MMOL/L (ref 136–145)
SP GR UR STRIP: 1.01 (ref 1–1.03)
UROBILINOGEN UR QL STRIP: ABNORMAL
WBC NRBC COR # BLD: 11.5 10*3/MM3 (ref 3.4–10.8)

## 2022-06-19 PROCEDURE — 96375 TX/PRO/DX INJ NEW DRUG ADDON: CPT

## 2022-06-19 PROCEDURE — 85025 COMPLETE CBC W/AUTO DIFF WBC: CPT | Performed by: EMERGENCY MEDICINE

## 2022-06-19 PROCEDURE — 81003 URINALYSIS AUTO W/O SCOPE: CPT | Performed by: EMERGENCY MEDICINE

## 2022-06-19 PROCEDURE — 99283 EMERGENCY DEPT VISIT LOW MDM: CPT

## 2022-06-19 PROCEDURE — 80053 COMPREHEN METABOLIC PANEL: CPT | Performed by: EMERGENCY MEDICINE

## 2022-06-19 PROCEDURE — 74177 CT ABD & PELVIS W/CONTRAST: CPT

## 2022-06-19 PROCEDURE — 25010000002 IOPAMIDOL 61 % SOLUTION: Performed by: EMERGENCY MEDICINE

## 2022-06-19 PROCEDURE — 25010000002 MORPHINE PER 10 MG: Performed by: EMERGENCY MEDICINE

## 2022-06-19 PROCEDURE — 25010000002 ONDANSETRON PER 1 MG: Performed by: EMERGENCY MEDICINE

## 2022-06-19 PROCEDURE — 96374 THER/PROPH/DIAG INJ IV PUSH: CPT

## 2022-06-19 PROCEDURE — 83690 ASSAY OF LIPASE: CPT | Performed by: EMERGENCY MEDICINE

## 2022-06-19 RX ORDER — MORPHINE SULFATE 4 MG/ML
4 INJECTION, SOLUTION INTRAMUSCULAR; INTRAVENOUS ONCE
Status: COMPLETED | OUTPATIENT
Start: 2022-06-19 | End: 2022-06-19

## 2022-06-19 RX ORDER — ONDANSETRON 2 MG/ML
4 INJECTION INTRAMUSCULAR; INTRAVENOUS ONCE
Status: COMPLETED | OUTPATIENT
Start: 2022-06-19 | End: 2022-06-19

## 2022-06-19 RX ADMIN — MORPHINE SULFATE 4 MG: 4 INJECTION, SOLUTION INTRAMUSCULAR; INTRAVENOUS at 21:44

## 2022-06-19 RX ADMIN — IOPAMIDOL 80 ML: 612 INJECTION, SOLUTION INTRAVENOUS at 22:43

## 2022-06-19 RX ADMIN — ONDANSETRON 4 MG: 2 INJECTION INTRAMUSCULAR; INTRAVENOUS at 21:43

## 2022-06-19 RX ADMIN — SODIUM CHLORIDE 1000 ML: 9 INJECTION, SOLUTION INTRAVENOUS at 21:44

## 2022-06-20 ENCOUNTER — PATIENT MESSAGE (OUTPATIENT)
Dept: INTERNAL MEDICINE | Facility: CLINIC | Age: 62
End: 2022-06-20

## 2022-06-20 VITALS
BODY MASS INDEX: 22.78 KG/M2 | RESPIRATION RATE: 18 BRPM | HEART RATE: 79 BPM | HEIGHT: 59 IN | SYSTOLIC BLOOD PRESSURE: 117 MMHG | WEIGHT: 113 LBS | OXYGEN SATURATION: 97 % | DIASTOLIC BLOOD PRESSURE: 66 MMHG | TEMPERATURE: 97.5 F

## 2022-06-20 DIAGNOSIS — R10.9 ABDOMINAL SPASMS: Primary | ICD-10-CM

## 2022-06-20 RX ORDER — DICYCLOMINE HCL 20 MG
20 TABLET ORAL EVERY 6 HOURS
Qty: 60 TABLET | Refills: 0 | Status: SHIPPED | OUTPATIENT
Start: 2022-06-20

## 2022-06-20 RX ORDER — MAGNESIUM CARB/ALUMINUM HYDROX 105-160MG
300 TABLET,CHEWABLE ORAL ONCE
Status: COMPLETED | OUTPATIENT
Start: 2022-06-20 | End: 2022-06-20

## 2022-06-20 RX ORDER — POLYETHYLENE GLYCOL 3350 17 G/17G
17 POWDER, FOR SOLUTION ORAL 2 TIMES DAILY
Qty: 20 EACH | Refills: 0 | Status: SHIPPED | OUTPATIENT
Start: 2022-06-20 | End: 2022-10-25 | Stop reason: HOSPADM

## 2022-06-20 RX ADMIN — MAGNESIUM CITRATE 296 ML: 1.75 LIQUID ORAL at 00:42

## 2022-06-20 NOTE — ED PROVIDER NOTES
TRIAGE CHIEF COMPLAINT:     Nursing and triage notes reviewed    Chief Complaint   Patient presents with   • Abdominal Pain     Pt states stomach pain started right after dinner about 1700 tonight. Pt has hx of spastic colon. No bowel or bladder since 1700. Pt has had N/V several times since 1700 due to the pain.       HPI: Areli Barnett is a 62 y.o. female who presents to the emergency department complaining of abdominal pain associated with nausea and vomiting.  Patient states that she had pain that onset acutely after dinner around 5 PM this evening.  She describes it diffuse sharp pain located primarily in the periumbilical abdomen.  She had several episodes of nausea with vomiting initially but none since.  She states she has not had a bowel movement since her symptoms began.  She states she has had a spastic colon before but nothing like this.  Denies fevers or chills.  No chest pain.    REVIEW OF SYSTEMS: All other systems reviewed and are negative     PAST MEDICAL HISTORY:   Past Medical History:   Diagnosis Date   • Anemia    • Arthritis    • Body piercing     EARS   • Depression    • Dysphagia     REPORTS SOMETIMES SHE HAS PAIN WHEN SWALLOWING FOOD   • Elevated cholesterol    • Elevated LFTs    • Endometriosis    • Epigastric pain    • Exposure to TB     REPORTS MANY YEARS AGO. REPORTS TB TESTING HAS ALWAYS BEEN NEGATIVE.    • Family history of breast cancer 10/6/2021    9/2021: Genetic testing neg for pathogenic mutations in BRCA1/2 and 34 additional genes included on the [employer] panel. Lifetime breast cancer risk is estimated to be up to 8.5%    • GERD (gastroesophageal reflux disease)    • History of bronchitis 01/2019   • History of chest pain     REPORTS FALL 2018 AND THAT SHE HAD TESTING THAT WAS WNL'S. REPORTS SHE IS NOW BEING CHECKED FOR GI.    • History of exercise stress test     2018 - REPORTS WAS TOLD ALL WNL'S    • History of fracture     TOE   • Hyperlipidemia    • Latex allergy    •  "Murmur     REPORTS \"A SLIGHT MURMUR\"   • Osteoporosis    • Plantar fasciitis    • Seasonal allergies    • Vertigo    • Wears glasses     PRN        FAMILY HISTORY:   Family History   Problem Relation Age of Onset   • Breast cancer Maternal Aunt    • Lung cancer Mother    • Cancer Mother    • Heart attack Father    • Crohn's disease Sister    • Breast cancer Sister    • Cancer Sister    • Stomach cancer Paternal Grandfather    • Alcohol abuse Paternal Grandfather    • Colon cancer Neg Hx    • Cirrhosis Neg Hx    • Liver disease Neg Hx    • Liver cancer Neg Hx    • Ulcerative colitis Neg Hx    • Esophageal cancer Neg Hx         SOCIAL HISTORY:   Social History     Socioeconomic History   • Marital status: Single   Tobacco Use   • Smoking status: Former Smoker     Packs/day: 0.25     Years: 5.00     Pack years: 1.25     Types: Cigarettes     Quit date: 2/4/2019     Years since quitting: 3.3   • Smokeless tobacco: Never Used   Vaping Use   • Vaping Use: Some days   • Substances: THC, CBD   Substance and Sexual Activity   • Alcohol use: Yes     Comment: once monthly   • Drug use: Yes     Types: Marijuana     Comment: daily   • Sexual activity: Yes     Partners: Male        SURGICAL HISTORY:   Past Surgical History:   Procedure Laterality Date   • ABDOMINOPLASTY  2003   • AUGMENTATION MAMMAPLASTY Bilateral    • COLONOSCOPY  02/02/2016   • ENDOSCOPY N/A 2/12/2019    Procedure: ESOPHAGOGASTRODUODENOSCOPY with cold biopsy and esophageal dilation;  Surgeon: Brenden Steward MD;  Location: [facility] ENDOSCOPY;  Service: Gastroenterology   • ENDOSCOPY N/A 6/11/2019    Procedure: ESOPHAGOGASTRODUODENOSCOPY W/ COLD FORCEP BIOPSIES;  Surgeon: Brenden Steward MD;  Location: [facility] ENDOSCOPY;  Service: Gastroenterology   • EPIDURAL     • UPPER GASTROINTESTINAL ENDOSCOPY  02/12/2019   • WISDOM TOOTH EXTRACTION          CURRENT MEDICATIONS:      Medication List      ASK your doctor about these medications    alendronate 70 MG " tablet  Commonly known as: FOSAMAX  Take 1 tablet by mouth Every 7 (Seven) Days.     amLODIPine 5 MG tablet  Commonly known as: NORVASC  TAKE ONE TABLET BY MOUTH EVERY DAY     atorvastatin 20 MG tablet  Commonly known as: LIPITOR  Take 1 tablet by mouth every night at bedtime.     buPROPion  MG 24 hr tablet  Commonly known as: Wellbutrin XL  Take 1 tablet by mouth Daily.     cetirizine 10 MG tablet  Commonly known as: zyrTEC     clotrimazole-betamethasone 1-0.05 % cream  Commonly known as: LOTRISONE  Apply  topically to the appropriate area as directed 2 (Two) Times a Day.     cyclobenzaprine 10 MG tablet  Commonly known as: FLEXERIL  Take 1 tablet by mouth 3 (Three) Times a Day As Needed for Muscle Spasms.     famotidine 40 MG tablet  Commonly known as: PEPCID  TAKE ONE TABLET BY MOUTH EVERY DAY AS NEEDED FOR indigestion OR heartburn     FLUoxetine 20 MG capsule  Commonly known as: PROzac     fluticasone 50 MCG/ACT nasal spray  Commonly known as: FLONASE  1 spray into the nostril(s) as directed by provider 2 (two) times a day. SHAKE LIQUID AND INSTILL 2 SPRAYS IN EACH NOSTRIL EVERY DAY     meloxicam 15 MG tablet  Commonly known as: MOBIC  TAKE ONE TABLET BY MOUTH EVERY DAY WITH FOOD     olopatadine 0.1 % ophthalmic solution  Commonly known as: Patanol  Administer 1 drop to both eyes 2 (Two) Times a Day.     ondansetron ODT 4 MG disintegrating tablet  Commonly known as: ZOFRAN-ODT  Place 1 tablet on the tongue Every 8 (Eight) Hours As Needed for Nausea or Vomiting.     Premarin 0.625 MG/GM vaginal cream  Generic drug: conjugated estrogens  Use 0.5 grams intravaginally twice weekly to control symptoms.     traZODone 50 MG tablet  Commonly known as: DESYREL  Take 1 tablet by mouth At Night As Needed for Sleep. for sleep     tretinoin 0.05 % cream  Commonly known as: Retin-A  Apply  topically to the appropriate area as directed At Night As Needed (acne).             ALLERGIES: Latex and Penicillins     PHYSICAL  EXAM:   VITAL SIGNS:   Vitals:    06/19/22 2002   BP: 141/89   Pulse: 80   Resp: 16   Temp: 97.5 °F (36.4 °C)   SpO2: 99%      CONSTITUTIONAL: Awake, oriented, appears nontoxic but uncomfortable  HENT: Atraumatic, normocephalic, oral mucosa pink and moist, airway patent. Nares patent without drainage. External ears normal.   EYES: Conjunctivae clear   NECK: Trachea midline   CARDIOVASCULAR: Normal heart rate, Normal rhythm, No murmurs, rubs, gallops   PULMONARY/CHEST: Clear to auscultation, no rhonchi, wheezes, or rales. Symmetrical breath sounds.  ABDOMINAL: Nondistended, soft, diffuse tenderness to palpation in all quadrants.  Voluntary guarding present.  NEUROLOGIC: Nonfocal, moving all four extremities, no gross sensory or motor deficits.   EXTREMITIES: No clubbing, cyanosis, or edema   SKIN: Warm, Dry, No erythema, No rash     ED COURSE / MEDICAL DECISION MAKING:   Areli Barnett is a 62 y.o. female who presents to the emergency department for evaluation of abdominal pain.  Patient does appear uncomfortable on arrival but is nontoxic.  Vital signs are stable.  Exam does reveal diffuse tenderness to palpation of the abdomen.  Will obtain labs and a CT scan for further evaluation.  We will treat patient with pain and nausea medication.    Laboratory testing reveals a white blood cell count of 11.  Sodium mildly low at 127.  Other labs largely unremarkable.    CT scan per radiology interpretation reveals a large amount of stool in the right colon but otherwise no obvious acute process.    Patient's pain abated with treatment rendered in the emergency department.    We will continue symptomatic therapy with very strict return precautions.    DECISION TO DISCHARGE/ADMIT: see ED care timeline     FINAL IMPRESSION:   1 --abdominal pain  2 --   3 --     Electronically signed by: Cassandra Pretty MD, 6/19/2022 21:34 Cassandra Cheng MD  06/20/22 0036

## 2022-07-05 DIAGNOSIS — H10.13 ALLERGIC CONJUNCTIVITIS OF BOTH EYES: ICD-10-CM

## 2022-07-05 RX ORDER — OLOPATADINE HYDROCHLORIDE 1 MG/ML
SOLUTION/ DROPS OPHTHALMIC
Qty: 5 ML | Refills: 5 | Status: SHIPPED | OUTPATIENT
Start: 2022-07-05

## 2022-07-25 RX ORDER — CETIRIZINE HYDROCHLORIDE 10 MG/1
TABLET ORAL
Qty: 30 TABLET | Refills: 5 | Status: SHIPPED | OUTPATIENT
Start: 2022-07-25

## 2022-07-25 NOTE — TELEPHONE ENCOUNTER
5/26/22   Benefits, risks, and possible complications of procedure explained to patient/caregiver who verbalized understanding and gave verbal consent.

## 2022-08-17 RX ORDER — AMLODIPINE BESYLATE 5 MG/1
TABLET ORAL
Qty: 30 TABLET | Refills: 3 | Status: SHIPPED | OUTPATIENT
Start: 2022-08-17 | End: 2022-12-20

## 2022-08-28 DIAGNOSIS — F33.1 MODERATE EPISODE OF RECURRENT MAJOR DEPRESSIVE DISORDER: ICD-10-CM

## 2022-08-29 RX ORDER — FLUOXETINE HYDROCHLORIDE 20 MG/1
CAPSULE ORAL
Qty: 90 CAPSULE | Refills: 5 | Status: SHIPPED | OUTPATIENT
Start: 2022-08-29

## 2022-09-05 DIAGNOSIS — F32.1 CURRENT MODERATE EPISODE OF MAJOR DEPRESSIVE DISORDER, UNSPECIFIED WHETHER RECURRENT: Chronic | ICD-10-CM

## 2022-09-06 RX ORDER — BUPROPION HYDROCHLORIDE 300 MG/1
TABLET ORAL
Qty: 90 TABLET | Refills: 1 | Status: SHIPPED | OUTPATIENT
Start: 2022-09-06 | End: 2023-01-19 | Stop reason: SDUPTHER

## 2022-09-06 NOTE — TELEPHONE ENCOUNTER
Patient last seen in February, cancelled April appt.  Patient needs a follow up appt to continue medication management. Thank you.

## 2022-10-06 ENCOUNTER — OFFICE VISIT (OUTPATIENT)
Dept: NEUROSURGERY | Facility: CLINIC | Age: 62
End: 2022-10-06

## 2022-10-06 VITALS — BODY MASS INDEX: 21.77 KG/M2 | WEIGHT: 108 LBS | TEMPERATURE: 97.5 F | HEIGHT: 59 IN

## 2022-10-06 DIAGNOSIS — M47.9 OSTEOARTHRITIS OF SPINE, UNSPECIFIED SPINAL OSTEOARTHRITIS COMPLICATION STATUS, UNSPECIFIED SPINAL REGION: ICD-10-CM

## 2022-10-06 DIAGNOSIS — M81.0 OSTEOPOROSIS WITHOUT CURRENT PATHOLOGICAL FRACTURE, UNSPECIFIED OSTEOPOROSIS TYPE: ICD-10-CM

## 2022-10-06 DIAGNOSIS — M51.36 DDD (DEGENERATIVE DISC DISEASE), LUMBAR: Primary | ICD-10-CM

## 2022-10-06 DIAGNOSIS — M54.41 CHRONIC RIGHT-SIDED LOW BACK PAIN WITH RIGHT-SIDED SCIATICA: ICD-10-CM

## 2022-10-06 DIAGNOSIS — G89.29 CHRONIC RIGHT-SIDED LOW BACK PAIN WITH RIGHT-SIDED SCIATICA: ICD-10-CM

## 2022-10-06 DIAGNOSIS — M47.816 LUMBAR FACET ARTHROPATHY: ICD-10-CM

## 2022-10-06 PROCEDURE — 99204 OFFICE O/P NEW MOD 45 MIN: CPT | Performed by: PHYSICIAN ASSISTANT

## 2022-10-06 RX ORDER — PREGABALIN 50 MG/1
50 CAPSULE ORAL 2 TIMES DAILY
Qty: 40 CAPSULE | Refills: 1 | Status: SHIPPED | OUTPATIENT
Start: 2022-10-06 | End: 2023-01-19 | Stop reason: DRUGHIGH

## 2022-10-06 NOTE — PROGRESS NOTES
"  Patient: Areli Barnett  : 1960  Chart #: [id number]    Date of Service: 10/06/2022    Chief Complaint   Patient presents with   • Low Back & Bilateral Hip Pain       HPI  This 62-year-old female has been having low back pain with pain radiating into the right buttocks since about 2018.  Her worst pain is when she is bending over, she does okay with walking without symptoms of neurogenic claudication.  The patient has undergone spinal decompression through [facility].  She had a right L4-5 and L5-S1 transforaminal steroid injection by Dr. Biggs on 2022 without any improvement in her symptoms.  The patient reports that she gets improvement in her symptoms with laying on her back.  The patient participates with water therapy as well as physical therapy and she is very health-conscious.  She is here today for evaluation regarding options for surgical intervention.    Chronic Illnesses:  Chronic low back pain  Past Medical History:   Diagnosis Date   • Anemia    • Arthritis    • Body piercing     EARS   • Depression    • Dysphagia     REPORTS SOMETIMES SHE HAS PAIN WHEN SWALLOWING FOOD   • Elevated cholesterol    • Elevated LFTs    • Endometriosis    • Epigastric pain    • Exposure to TB     REPORTS MANY YEARS AGO. REPORTS TB TESTING HAS ALWAYS BEEN NEGATIVE.    • Family history of breast cancer 10/06/2021    2021: Genetic testing neg for pathogenic mutations in BRCA1/2 and 34 additional genes included on the CancerNext panel. Lifetime breast cancer risk is estimated to be up to 8.5%    • GERD (gastroesophageal reflux disease)    • History of bronchitis 2019   • History of chest pain     REPORTS FALL 2018 AND THAT SHE HAD TESTING THAT WAS WNL'S. REPORTS SHE IS NOW BEING CHECKED FOR GI.    • History of exercise stress test     2018 - REPORTS WAS TOLD ALL WNL'S    • History of fracture     TOE   • Hyperlipidemia    • Latex allergy    • Low back pain    • Murmur     REPORTS \"A SLIGHT " "MURMUR\"   • Osteoporosis    • Plantar fasciitis    • Seasonal allergies    • Vertigo    • Wears glasses     PRN         Past Surgical History:   Procedure Laterality Date   • ABDOMINOPLASTY  2003   • AUGMENTATION MAMMAPLASTY Bilateral    • COLONOSCOPY  02/02/2016   • ENDOSCOPY N/A 2/12/2019    Procedure: ESOPHAGOGASTRODUODENOSCOPY with cold biopsy and esophageal dilation;  Surgeon: Brenden Steward MD;  Location: [facility] ENDOSCOPY;  Service: Gastroenterology   • ENDOSCOPY N/A 6/11/2019    Procedure: ESOPHAGOGASTRODUODENOSCOPY W/ COLD FORCEP BIOPSIES;  Surgeon: Brenden Steward MD;  Location: [facility] ENDOSCOPY;  Service: Gastroenterology   • EPIDURAL     • UPPER GASTROINTESTINAL ENDOSCOPY  02/12/2019   • WISDOM TOOTH EXTRACTION         Allergies   Allergen Reactions   • Latex Rash   • Penicillins Rash         Current Outpatient Medications:   •  alendronate (FOSAMAX) 70 MG tablet, Take 1 tablet by mouth Every 7 (Seven) Days. (Patient taking differently: Take 70 mg by mouth Every 7 (Seven) Days. When remembered), Disp: 12 tablet, Rfl: 3  •  amLODIPine (NORVASC) 5 MG tablet, TAKE ONE TABLET BY MOUTH EVERY DAY, Disp: 30 tablet, Rfl: 3  •  atorvastatin (LIPITOR) 20 MG tablet, Take 1 tablet by mouth every night at bedtime., Disp: 30 tablet, Rfl: 5  •  buPROPion XL (WELLBUTRIN XL) 300 MG 24 hr tablet, TAKE ONE TABLET BY MOUTH EVERY DAY, Disp: 90 tablet, Rfl: 1  •  cetirizine (zyrTEC) 10 MG tablet, TAKE ONE TABLET BY MOUTH AT BEDTIME, Disp: 30 tablet, Rfl: 5  •  clotrimazole-betamethasone (LOTRISONE) 1-0.05 % cream, Apply  topically to the appropriate area as directed 2 (Two) Times a Day., Disp: 15 g, Rfl: 0  •  conjugated estrogens (Premarin) 0.625 MG/GM vaginal cream, Use 0.5 grams intravaginally twice weekly to control symptoms., Disp: 1 each, Rfl: 11  •  cyclobenzaprine (FLEXERIL) 10 MG tablet, Take 1 tablet by mouth 3 (Three) Times a Day As Needed for Muscle Spasms., Disp: 30 tablet, Rfl: 2  •  dicyclomine (BENTYL) 20 MG " tablet, Take 1 tablet by mouth Every 6 (Six) Hours., Disp: 60 tablet, Rfl: 0  •  famotidine (PEPCID) 40 MG tablet, TAKE ONE TABLET BY MOUTH EVERY DAY AS NEEDED FOR indigestion OR heartburn, Disp: 30 tablet, Rfl: 2  •  FLUoxetine (PROzac) 20 MG capsule, TAKE THREE CAPSULES BY MOUTH EVERY DAY, Disp: 90 capsule, Rfl: 5  •  fluticasone (FLONASE) 50 MCG/ACT nasal spray, 1 spray into the nostril(s) as directed by provider 2 (two) times a day. SHAKE LIQUID AND INSTILL 2 SPRAYS IN EACH NOSTRIL EVERY DAY (Patient taking differently: 1 spray into the nostril(s) as directed by provider. SHAKE LIQUID AND INSTILL 2 SPRAYS IN EACH NOSTRIL EVERY DAY PRN), Disp: 48 g, Rfl: 3  •  meloxicam (MOBIC) 15 MG tablet, TAKE ONE TABLET BY MOUTH EVERY DAY WITH FOOD, Disp: 90 tablet, Rfl: 1  •  olopatadine (PATANOL) 0.1 % ophthalmic solution, INSTILL ONE DROP IN EACH EYE TWICE DAILY, Disp: 5 mL, Rfl: 5  •  ondansetron ODT (ZOFRAN-ODT) 4 MG disintegrating tablet, Place 1 tablet on the tongue Every 8 (Eight) Hours As Needed for Nausea or Vomiting., Disp: 30 tablet, Rfl: 1  •  polyethylene glycol (MIRALAX) 17 g packet, Take 17 g by mouth 2 (Two) Times a Day., Disp: 20 each, Rfl: 0  •  traZODone (DESYREL) 50 MG tablet, Take 1 tablet by mouth At Night As Needed for Sleep. for sleep, Disp: 90 tablet, Rfl: 3  •  tretinoin (Retin-A) 0.05 % cream, Apply  topically to the appropriate area as directed At Night As Needed (acne). (Patient taking differently: Apply  topically to the appropriate area as directed At Night As Needed (acne). Every night), Disp: 20 g, Rfl: 2  •  pregabalin (Lyrica) 50 MG capsule, Take 1 capsule by mouth 2 (Two) Times a Day. Start with one at hs X 5 nights then BID., Disp: 40 capsule, Rfl: 1    Social History     Socioeconomic History   • Marital status: Single   Tobacco Use   • Smoking status: Former Smoker     Packs/day: 0.25     Years: 5.00     Pack years: 1.25     Types: Cigarettes     Quit date: 2/4/2019     Years since  quitting: 3.6   • Smokeless tobacco: Never Used   Vaping Use   • Vaping Use: Some days   • Substances: THC, CBD   Substance and Sexual Activity   • Alcohol use: Yes     Comment: once monthly   • Drug use: Yes     Types: Marijuana     Comment: daily   • Sexual activity: Yes     Partners: Male       Family History   Problem Relation Age of Onset   • Breast cancer Maternal Aunt    • Lung cancer Mother    • Cancer Mother    • Heart attack Father    • Crohn's disease Sister    • Breast cancer Sister    • Cancer Sister    • Stomach cancer Paternal Grandfather    • Alcohol abuse Paternal Grandfather    • Colon cancer Neg Hx    • Cirrhosis Neg Hx    • Liver disease Neg Hx    • Liver cancer Neg Hx    • Ulcerative colitis Neg Hx    • Esophageal cancer Neg Hx        BMI is within normal parameters. No other follow-up for BMI required.       Social History    Tobacco Use      Smoking status: Former Smoker        Packs/day: 0.25        Years: 5.00        Pack years: 1.25        Types: Cigarettes        Quit date: 2/4/2019        Years since quitting: 3.6      Smokeless tobacco: Never Used       Review of Systems   Constitutional: Positive for activity change. Negative for appetite change, chills, diaphoresis, fatigue, fever and unexpected weight change.   HENT: Positive for sinus pressure and tinnitus. Negative for congestion, dental problem, drooling, ear discharge, ear pain, facial swelling, hearing loss, mouth sores, nosebleeds, postnasal drip, rhinorrhea, sinus pain, sneezing, sore throat, trouble swallowing and voice change.    Eyes: Positive for itching. Negative for photophobia, pain, discharge, redness and visual disturbance.   Respiratory: Negative for apnea, cough, choking, chest tightness, shortness of breath, wheezing and stridor.    Cardiovascular: Negative for chest pain, palpitations and leg swelling.   Gastrointestinal: Negative for abdominal distention, abdominal pain, anal bleeding, blood in stool,  constipation, diarrhea, nausea, rectal pain and vomiting.   Endocrine: Negative for cold intolerance, heat intolerance, polydipsia, polyphagia and polyuria.   Genitourinary: Negative for decreased urine volume, difficulty urinating, dyspareunia, dysuria, enuresis, flank pain, frequency, genital sores, hematuria, menstrual problem, pelvic pain, urgency, vaginal bleeding, vaginal discharge and vaginal pain.   Musculoskeletal: Positive for back pain. Negative for arthralgias, gait problem, joint swelling, myalgias, neck pain and neck stiffness.   Skin: Negative for color change, pallor, rash and wound.   Allergic/Immunologic: Negative for environmental allergies, food allergies and immunocompromised state.   Neurological: Positive for dizziness. Negative for tremors, seizures, syncope, facial asymmetry, speech difficulty, weakness, light-headedness, numbness and headaches.   Hematological: Negative for adenopathy. Does not bruise/bleed easily.   Psychiatric/Behavioral: Negative for agitation, behavioral problems, confusion, decreased concentration, dysphoric mood, hallucinations, self-injury, sleep disturbance and suicidal ideas. The patient is not nervous/anxious and is not hyperactive.         Gait & Balance Assessment:  Risk assessment for falls. Fall precautions:  such as;   • Using gait aids a cane, walker at the appropriate height at all times for ambulation or if necessary a wheelchair  • Removing all area rugs and coffee tables to create a safe environment at home  • Ensure clean, dry floors  • Wearing supportive footwear and properly fitting clothing  • Ensure bed/chair is appropriate height and patient's feet can touch the floor  • Using a shower transfer bench  • Using walk-in shower and having shower safety bars installed  • Ensure proper lighting, minimize glare  • Have nightlights operational and in use  • Participation in an exercise program for gait training, balance training and strength  • Avoid  "carrying laundry up and down steps  • Ensure proper compliance and organization of medications to avoid errors   • Avoid use of over the counter sedatives and alcohol consumption  • Ensure easy access to call bell, glasses, TV control, telephone  • Ensure glasses/hearing aids are in use or close by (on top of night table)     Physical examination:  Temperature 97.5 °F (36.4 °C), height 149.9 cm (59\"), weight 49 kg (108 lb), not currently breastfeeding.  HEENT- normocephalic, atraumatic, sclera clear  Lungs-normal expansion, no wheezing  Heart-regular rate and rhythm  Extremities-positive pulses, no edema    Neurologic Exam  WDWN WF  A/A/C, speech clear, attention normal, conversant, answers questions appropriately, good historian.  Cranial nerves II through XII are intact.  Motor examination does not reveal weakness in the lower extremities.   Sensation is intact.  Gait is normal, balance is normal.   No tremors are noted.  Reflexes are intact in the patellas, absent in the Achilles bilaterally.  Straight leg raising causes back pain and a pull in her hamstrings  Parker is negative. Clonus is negative.   Palpation reveals normal alignment.    Radiographic Imaging:  For my review is a lumbar MRI scan From 11/18/2021 which shows multilevel degenerative disc disease throughout the lumbar spine except for L5-S1.  There is significant changes at L4-5 with collapse of the disc space, Modic changes within the bone, disc bulging that creates mild left foraminal narrowing and significant right foraminal narrowing.          Medical Decision Making  Diagnoses and all orders for this visit:    1. DDD (degenerative disc disease), lumbar (Primary)  -     pregabalin (Lyrica) 50 MG capsule; Take 1 capsule by mouth 2 (Two) Times a Day. Start with one at hs X 5 nights then BID.  Dispense: 40 capsule; Refill: 1    2. Lumbar facet arthropathy    3. Osteoporosis without current pathological fracture, unspecified osteoporosis type    4. " "Osteoarthritis of spine, unspecified spinal osteoarthritis complication status, unspecified spinal region    5. Chronic right-sided low back pain with right-sided sciatica       Lengthy discussion with the patient today regarding the severity of her symptoms and how it affects her activities of daily living surgical intervention would be at a time when the patient feels that she cannot participate with ADLs and her therapy.  She had tried gabapentin in the past but it made her \"mean\".  We have discussed trying a low-dose Lyrica for her pain and she would like to give this a try, I instructed her to start with a dose in the evening for a week and then if she does not have any side effects then I would go to twice daily.       Christy Damon, PAC    Patient Care Team:  Salvatore Townsend MD as PCP - General (Family Medicine)  Salvatore Townsend MD as PCP - Family Medicine (Family Medicine)  Blanca Damon PA-C as Consulting Physician (Physician Assistant)        "

## 2022-10-07 ENCOUNTER — TELEPHONE (OUTPATIENT)
Dept: NEUROSURGERY | Facility: CLINIC | Age: 62
End: 2022-10-07

## 2022-10-07 NOTE — TELEPHONE ENCOUNTER
Pt called wanting to know if you got a chance to talk to one of the surgeons about her case? She is requesting a call back at your earliest convenience.     [phone]

## 2022-10-12 ENCOUNTER — TELEPHONE (OUTPATIENT)
Dept: NEUROSURGERY | Facility: CLINIC | Age: 62
End: 2022-10-12

## 2022-10-12 NOTE — TELEPHONE ENCOUNTER
Documentation Only: I have sent in a PA for Lyrica.  They have denied the Lyrica.  I have called Cover My Meds and I have resent this in again.

## 2022-10-17 ENCOUNTER — TELEPHONE (OUTPATIENT)
Dept: NEUROSURGERY | Facility: CLINIC | Age: 62
End: 2022-10-17

## 2022-10-17 NOTE — TELEPHONE ENCOUNTER
I have sent this in a second  time and it was denied again. I have called and left a VM letting the patient know.

## 2022-10-20 ENCOUNTER — OFFICE VISIT (OUTPATIENT)
Dept: PSYCHIATRY | Facility: CLINIC | Age: 62
End: 2022-10-20

## 2022-10-20 VITALS
SYSTOLIC BLOOD PRESSURE: 120 MMHG | HEART RATE: 76 BPM | HEIGHT: 59 IN | DIASTOLIC BLOOD PRESSURE: 68 MMHG | BODY MASS INDEX: 21.97 KG/M2 | WEIGHT: 109 LBS

## 2022-10-20 DIAGNOSIS — F32.1 CURRENT MODERATE EPISODE OF MAJOR DEPRESSIVE DISORDER, UNSPECIFIED WHETHER RECURRENT: Primary | Chronic | ICD-10-CM

## 2022-10-20 DIAGNOSIS — F41.1 GENERALIZED ANXIETY DISORDER: Chronic | ICD-10-CM

## 2022-10-20 DIAGNOSIS — E78.2 MIXED HYPERLIPIDEMIA: ICD-10-CM

## 2022-10-20 DIAGNOSIS — G47.09 OTHER INSOMNIA: Chronic | ICD-10-CM

## 2022-10-20 PROCEDURE — 99214 OFFICE O/P EST MOD 30 MIN: CPT | Performed by: NURSE PRACTITIONER

## 2022-10-20 RX ORDER — ATORVASTATIN CALCIUM 20 MG/1
TABLET, FILM COATED ORAL
Qty: 30 TABLET | Refills: 11 | Status: SHIPPED | OUTPATIENT
Start: 2022-10-20

## 2022-10-20 NOTE — PROGRESS NOTES
"Chief Complaint  Anxiety, Depression, and Sleeping Problem    Subjective          Areli Barnett presents to [facility] for medication management of her anxiety, depression, sleeping difficulties.    History of Present Illness: Patient presents today for follow-up appointment after last being seen on 02/18/2022.  Patient says \"I am doing really good\".  She says she is sore today because she has been swimming and working out at the [facility].  She reports her doctor recently started her on Lyrica for her nerve pain.  She is not sure yet if it is helping.  Patient reports over the last 8 months she has been taking her medications consistently.  She says the only one she does not take very often is trazodone.  She says \"I barely ever take that.  I think I sleep really well without it\".  Patient says a combination of Wellbutrin and Prozac continue to work very well for her mood and her anxiety.  She denies a presence of any concerns regarding either.  Patient denies any new or significant issues with her appetite.  Patient denies any SI/HI, A/V hallucinations.      The following portions of the patient's history were reviewed and updated as appropriate: allergies, current medications, past family history, past medical history, past social history, past surgical history and problem list.      Current Medications:   Current Outpatient Medications   Medication Sig Dispense Refill   • alendronate (FOSAMAX) 70 MG tablet Take 1 tablet by mouth Every 7 (Seven) Days. (Patient taking differently: Take 1 tablet by mouth Every 7 (Seven) Days. When remembered) 12 tablet 3   • amLODIPine (NORVASC) 5 MG tablet TAKE ONE TABLET BY MOUTH EVERY DAY 30 tablet 3   • atorvastatin (LIPITOR) 20 MG tablet Take 1 tablet by mouth every night at bedtime. 30 tablet 5   • buPROPion XL (WELLBUTRIN XL) 300 MG 24 hr tablet TAKE ONE TABLET BY MOUTH EVERY DAY 90 tablet 1   • cetirizine (zyrTEC) 10 MG tablet TAKE " ONE TABLET BY MOUTH AT BEDTIME 30 tablet 5   • clotrimazole-betamethasone (LOTRISONE) 1-0.05 % cream Apply  topically to the appropriate area as directed 2 (Two) Times a Day. 15 g 0   • conjugated estrogens (Premarin) 0.625 MG/GM vaginal cream Use 0.5 grams intravaginally twice weekly to control symptoms. 1 each 11   • cyclobenzaprine (FLEXERIL) 10 MG tablet Take 1 tablet by mouth 3 (Three) Times a Day As Needed for Muscle Spasms. 30 tablet 2   • dicyclomine (BENTYL) 20 MG tablet Take 1 tablet by mouth Every 6 (Six) Hours. 60 tablet 0   • famotidine (PEPCID) 40 MG tablet TAKE ONE TABLET BY MOUTH EVERY DAY AS NEEDED FOR indigestion OR heartburn 30 tablet 2   • FLUoxetine (PROzac) 20 MG capsule TAKE THREE CAPSULES BY MOUTH EVERY DAY 90 capsule 5   • fluticasone (FLONASE) 50 MCG/ACT nasal spray 1 spray into the nostril(s) as directed by provider 2 (two) times a day. SHAKE LIQUID AND INSTILL 2 SPRAYS IN EACH NOSTRIL EVERY DAY (Patient taking differently: 1 spray into the nostril(s) as directed by provider. SHAKE LIQUID AND INSTILL 2 SPRAYS IN EACH NOSTRIL EVERY DAY PRN) 48 g 3   • meloxicam (MOBIC) 15 MG tablet TAKE ONE TABLET BY MOUTH EVERY DAY WITH FOOD 90 tablet 1   • olopatadine (PATANOL) 0.1 % ophthalmic solution INSTILL ONE DROP IN EACH EYE TWICE DAILY 5 mL 5   • ondansetron ODT (ZOFRAN-ODT) 4 MG disintegrating tablet Place 1 tablet on the tongue Every 8 (Eight) Hours As Needed for Nausea or Vomiting. 30 tablet 1   • polyethylene glycol (MIRALAX) 17 g packet Take 17 g by mouth 2 (Two) Times a Day. 20 each 0   • pregabalin (Lyrica) 50 MG capsule Take 1 capsule by mouth 2 (Two) Times a Day. Start with one at hs X 5 nights then BID. 40 capsule 1   • traZODone (DESYREL) 50 MG tablet Take 1 tablet by mouth At Night As Needed for Sleep. for sleep 90 tablet 3   • tretinoin (Retin-A) 0.05 % cream Apply  topically to the appropriate area as directed At Night As Needed (acne). (Patient taking differently: Apply  topically  "to the appropriate area as directed At Night As Needed (acne). Every night) 20 g 2     No current facility-administered medications for this visit.       Mental Status Exam:   Hygiene:   good  Cooperation:  Cooperative  Eye Contact:  Good  Psychomotor Behavior:  Appropriate  Affect:  Appropriate  Mood: euthymic  Speech:  Normal  Thought Process:  Goal directed  Thought Content:  Mood congruent  Suicidal:  None  Homicidal:  None  Hallucinations:  None  Delusion:  None  Memory:  Intact  Orientation:  Person, Place, Time and Situation  Reliability:  fair  Insight:  Good  Judgement:  Good  Impulse Control:  Good  Physical/Medical Issues:  HLD, GERD, plantar fasciitis       Objective   Vital Signs:   /68   Pulse 76   Ht 149.9 cm (59\")   Wt 49.4 kg (109 lb)   BMI 22.02 kg/m²     Physical Exam  Neurological:      Mental Status: She is oriented to person, place, and time. Mental status is at baseline.      Coordination: Coordination is intact.      Gait: Gait is intact.   Psychiatric:         Behavior: Behavior is cooperative.        Result Review :     The following data was reviewed by: JAI Gillette on 10/20/2022:    Data reviewed: Previous note, medication history          Assessment and Plan    Diagnoses and all orders for this visit:    1. Current moderate episode of major depressive disorder, unspecified whether recurrent (HCC) (Primary)    2. Generalized anxiety disorder    3. Other insomnia         PHQ-9 Score:   PHQ-9 Total Score: 0      Depression Screening:  Patient screened positive for depression based on a PHQ-9 score of 0 on 10/20/2022. Follow-up recommendations include: Prescribed antidepressant medication treatment and Suicide Risk Assessment performed.        Tobacco Cessation:  Patient is a former tobacco user. No tobacco cessation education necessary.      Impression/Plan:  -This is a follow-up appointment.  Patient presents today reports she has been doing well overall since her last " appointment.  She endorses taking her medication as prescribed, and denies any adverse effects associated with them.  She has not been seen for 8 months, but reports she has been doing very well during that time.  She denies any issues or concerns regarding her mood or anxiety.  She reports she sleeps well, and does not have to use her trazodone very often.  She is happy with her regimen and how well she is doing.  -Maintain Wellbutrin  mg daily.  -Maintain Prozac 60 mg daily.  -Maintain trazodone 50 mg nightly.  -Patient's CONNOR report reviewed and deemed appropriate.  Patient counseled on use of controlled substances.  -Reviewed available lab work.  -Schedule follow-up appointment for 3 months or as needed.      MEDS ORDERED DURING VISIT:  No orders of the defined types were placed in this encounter.        Follow Up   Return in about 3 months (around 1/20/2023), or if symptoms worsen or fail to improve, for Next scheduled follow up.  Patient was given instructions and counseling regarding her condition or for health maintenance advice. Please see specific information pulled into the AVS if appropriate.       TREATMENT PLAN/GOALS: Continue supportive psychotherapy efforts and medications as indicated. Treatment and medication options discussed during today's visit. Patient acknowledged and verbally consented to continue with current treatment plan and was educated on the importance of compliance with treatment and follow-up appointments.    MEDICATION ISSUES:  Discussed medication options and treatment plan of prescribed medication as well as the risks, benefits, and side effects including potential falls, possible impaired driving and metabolic adversities among others. Patient is agreeable to call the office with any worsening of symptoms or onset of side effects. Patient is agreeable to call 911 or go to the nearest ER should he/she begin having SI/HI.          This document has been electronically  signed by JAI Scott, PMHNP-BC  October 20, 2022 14:48 EDT      Part of this note may be an electronic transcription/translation of spoken language to printed text using the Dragon Dictation System.

## 2022-10-25 ENCOUNTER — OFFICE VISIT (OUTPATIENT)
Dept: INTERNAL MEDICINE | Facility: CLINIC | Age: 62
End: 2022-10-25

## 2022-10-25 ENCOUNTER — LAB (OUTPATIENT)
Dept: LAB | Facility: HOSPITAL | Age: 62
End: 2022-10-25

## 2022-10-25 VITALS
WEIGHT: 108.4 LBS | HEIGHT: 59 IN | DIASTOLIC BLOOD PRESSURE: 72 MMHG | TEMPERATURE: 97.7 F | BODY MASS INDEX: 21.85 KG/M2 | HEART RATE: 64 BPM | SYSTOLIC BLOOD PRESSURE: 100 MMHG

## 2022-10-25 DIAGNOSIS — F41.1 GENERALIZED ANXIETY DISORDER: Chronic | ICD-10-CM

## 2022-10-25 DIAGNOSIS — I10 ESSENTIAL HYPERTENSION: Chronic | ICD-10-CM

## 2022-10-25 DIAGNOSIS — Z00.00 WELL ADULT EXAM: Primary | ICD-10-CM

## 2022-10-25 DIAGNOSIS — E78.2 MIXED HYPERLIPIDEMIA: Chronic | ICD-10-CM

## 2022-10-25 DIAGNOSIS — Z23 NEED FOR PNEUMOCOCCAL VACCINE: ICD-10-CM

## 2022-10-25 DIAGNOSIS — F33.42 RECURRENT MAJOR DEPRESSIVE DISORDER, IN FULL REMISSION: Chronic | ICD-10-CM

## 2022-10-25 DIAGNOSIS — R21 SKIN RASH: ICD-10-CM

## 2022-10-25 PROBLEM — R11.0 NAUSEA: Status: RESOLVED | Noted: 2019-01-30 | Resolved: 2022-10-25

## 2022-10-25 PROBLEM — R12 HEARTBURN: Status: RESOLVED | Noted: 2019-01-30 | Resolved: 2022-10-25

## 2022-10-25 PROBLEM — F32.A ANXIETY AND DEPRESSION: Status: RESOLVED | Noted: 2022-01-04 | Resolved: 2022-10-25

## 2022-10-25 PROBLEM — R10.33 PERIUMBILICAL ABDOMINAL PAIN: Status: RESOLVED | Noted: 2019-01-30 | Resolved: 2022-10-25

## 2022-10-25 PROBLEM — F41.9 ANXIETY AND DEPRESSION: Status: RESOLVED | Noted: 2022-01-04 | Resolved: 2022-10-25

## 2022-10-25 PROCEDURE — 99396 PREV VISIT EST AGE 40-64: CPT | Performed by: STUDENT IN AN ORGANIZED HEALTH CARE EDUCATION/TRAINING PROGRAM

## 2022-10-25 PROCEDURE — 90471 IMMUNIZATION ADMIN: CPT | Performed by: STUDENT IN AN ORGANIZED HEALTH CARE EDUCATION/TRAINING PROGRAM

## 2022-10-25 PROCEDURE — 90677 PCV20 VACCINE IM: CPT | Performed by: STUDENT IN AN ORGANIZED HEALTH CARE EDUCATION/TRAINING PROGRAM

## 2022-10-25 RX ORDER — TRETINOIN 0.5 MG/G
CREAM TOPICAL NIGHTLY PRN
Qty: 20 G | Refills: 2 | Status: SHIPPED | OUTPATIENT
Start: 2022-10-25

## 2022-10-25 NOTE — PROGRESS NOTES
"Chief Complaint  Areli Barnett is a 62 y.o. female presenting for Annual Exam (Fasting ) and cramps (Left leg ).     Patient has a past medical history of allergic rhinitis, hyperlipidemia, hypertension, palpitations, GERD, esophageal stricture, Helicobacter pylori, anxiety, depression, osteoporosis, osteoarthritis including degenerative changes of the spine with right L4-5 R severe foraminal stenosis w/radiulopathy.    History of Present Illness  Patient is here for annual physical.    She states her back pain and sciatica have improved overall.  Neurosurgery started her on Lyrica, she did not tolerate gabapentin.  She is aware that I cannot prescribe her more controlled medications because she was in violation of treatment contract in the past, but I offered her continued care as PCP under the circumstances.  Patient is also reporting some cramping of her right thigh at times.    She is overall doing well.  She would like pneumococcus today, she had COVID booster and flu vaccine over 2 weeks ago..  She is also requesting refill on Retin-A.    The following portions of the patient's history were reviewed and updated as appropriate: allergies, current medications, past family history, past medical history, past social history, past surgical history and problem list.    Objective  /72 (BP Location: Left arm, Patient Position: Sitting, Cuff Size: Adult)   Pulse 64   Temp 97.7 °F (36.5 °C) (Temporal)   Ht 149.9 cm (59.02\")   Wt 49.2 kg (108 lb 6.4 oz)   LMP  (LMP Unknown)   BMI 21.88 kg/m²     Physical Exam  Vitals reviewed.   Constitutional:       Appearance: Normal appearance.   HENT:      Head: Normocephalic and atraumatic.      Nose: No congestion.   Eyes:      Extraocular Movements: Extraocular movements intact.      Conjunctiva/sclera: Conjunctivae normal.   Cardiovascular:      Rate and Rhythm: Normal rate and regular rhythm.      Heart sounds: Normal heart sounds. No murmur heard.  Pulmonary:      " Effort: Pulmonary effort is normal.      Breath sounds: Normal breath sounds.   Abdominal:      General: There is no distension.      Palpations: Abdomen is soft. There is no mass.      Tenderness: There is no abdominal tenderness.   Musculoskeletal:      Cervical back: Neck supple.      Right lower leg: No edema.      Left lower leg: No edema.   Skin:     General: Skin is warm and dry.   Neurological:      Mental Status: She is alert and oriented to person, place, and time. Mental status is at baseline.   Psychiatric:         Behavior: Behavior normal.         Thought Content: Thought content normal.         Assessment/Plan   1. Well adult exam  Counseled on recommendations for pneumococcus vaccine, she had 2 PCV 23, but did not have Prevnar 13.  She is due for Prevnar 20.  Also counseled on recommendations for annual flu shot, already given this year.  BMI is within normal parameters. No other follow-up for BMI required.    2. Essential hypertension  BP Readings from Last 3 Encounters:   10/25/22 100/72   10/20/22 120/68   06/20/22 117/66   Blood pressure currently at goal off medications.  - Basic metabolic panel; Future    3. Mixed hyperlipidemia  Patient is fasting for lipids today  - Lipid panel; Future    4. Skin rash  Stable, continue Retin-A.  - tretinoin (Retin-A) 0.05 % cream; Apply  topically to the appropriate area as directed At Night As Needed (acne).  Dispense: 20 g; Refill: 2    5. Recurrent major depressive disorder, in full remission (HCC)  6. Generalized anxiety disorder  Patient has been following with psychiatry for Rx and counseling.  She feels her mental health is well controlled and has follow-up with them in 3 months.  She is considering further follow-up with me for her medications.  She will return for follow-up visit with me in 6 months.    7. Need for pneumococcal vaccine  Administered today  - Pneumococcal Conjugate Vaccine 20-Valent (PCV20)      Return in about 6 months (around  4/25/2023) for Recheck.    Future Appointments       Provider Department Center    1/19/2023 3:15 PM Ricardo Matt APRN [facility]    4/25/2023 12:45 PM Salvatore Townsend MD [facility] INTERNAL MEDICINE [facility]            Salvatore Townsend MD  Family Medicine  10/25/2022

## 2022-10-26 LAB
BUN SERPL-MCNC: 11 MG/DL (ref 8–23)
BUN/CREAT SERPL: 14.3 (ref 7–25)
CALCIUM SERPL-MCNC: 9 MG/DL (ref 8.6–10.5)
CHLORIDE SERPL-SCNC: 102 MMOL/L (ref 98–107)
CHOLEST SERPL-MCNC: 170 MG/DL (ref 0–200)
CO2 SERPL-SCNC: 24.2 MMOL/L (ref 22–29)
CREAT SERPL-MCNC: 0.77 MG/DL (ref 0.57–1)
EGFRCR SERPLBLD CKD-EPI 2021: 88.4 ML/MIN/1.73
GLUCOSE SERPL-MCNC: 82 MG/DL (ref 65–99)
HDLC SERPL-MCNC: 99 MG/DL (ref 40–60)
LDLC SERPL CALC-MCNC: 59 MG/DL (ref 0–100)
POTASSIUM SERPL-SCNC: 4.2 MMOL/L (ref 3.5–5.2)
SODIUM SERPL-SCNC: 138 MMOL/L (ref 136–145)
TRIGL SERPL-MCNC: 63 MG/DL (ref 0–150)
VLDLC SERPL CALC-MCNC: 12 MG/DL (ref 5–40)

## 2022-11-02 ENCOUNTER — TELEPHONE (OUTPATIENT)
Dept: INTERNAL MEDICINE | Facility: CLINIC | Age: 62
End: 2022-11-02

## 2022-11-02 NOTE — TELEPHONE ENCOUNTER
----- Message from Salvatore Townsend MD sent at 11/1/2022  8:20 AM EDT -----  Please update patient on lab results, it looks like she has not read her lab message.

## 2022-11-19 ENCOUNTER — PATIENT MESSAGE (OUTPATIENT)
Dept: NEUROSURGERY | Facility: CLINIC | Age: 62
End: 2022-11-19

## 2022-11-20 DIAGNOSIS — M51.36 DDD (DEGENERATIVE DISC DISEASE), LUMBAR: ICD-10-CM

## 2022-11-21 DIAGNOSIS — M47.816 LUMBAR FACET ARTHROPATHY: Primary | ICD-10-CM

## 2022-11-21 DIAGNOSIS — M47.9 OSTEOARTHRITIS OF SPINE, UNSPECIFIED SPINAL OSTEOARTHRITIS COMPLICATION STATUS, UNSPECIFIED SPINAL REGION: ICD-10-CM

## 2022-11-21 DIAGNOSIS — M51.36 DDD (DEGENERATIVE DISC DISEASE), LUMBAR: ICD-10-CM

## 2022-11-21 RX ORDER — PREGABALIN 75 MG/1
75 CAPSULE ORAL 2 TIMES DAILY
Qty: 60 CAPSULE | Refills: 1 | Status: SHIPPED | OUTPATIENT
Start: 2022-11-21 | End: 2023-01-23

## 2022-11-21 NOTE — TELEPHONE ENCOUNTER
From: Areli Barnett  To: Blanca Damon PA-C  Sent: 11/19/2022 8:56 AM EST  Subject: Pregabalin    Happy Thanksgiving . I'm ready to try a higher dose of the Pregabalin as we discussed at my office visit. I haven't noticed any side effects. I have noticed some improvement but still have good days and then bad days. I do some sort of physical thereapy every day. I prefer the warm water swimming pool the most. I'm walking on the treadmill right now and my left leg is cramping like a b###*. I'm not sure why? I need to feel better so I can rehabilitate after this injury. I'm just still not doing well. So let's try an increase on my meds and then go from there. Oh yeah, I got voted in to host Thanks Giving. Salvador!

## 2022-11-21 NOTE — TELEPHONE ENCOUNTER
Provider:  Marisol  Caller:  Automated refill request  Surgery:  NA  Surgery Date: NA   Last visit:  Office Visit with Blanca Damon PA-C (10/06/2022)  Next visit: NA  Last filled: Office Visit with Blanca Damon PA-C (10/06/2022)        Reason for call:         Automated refill request for Lyrica. Medication pending.     Requested Prescriptions     Pending Prescriptions Disp Refills   • Lyrica 50 MG capsule [Pharmacy Med Name: Lyrica 50 mg capsule] 40 capsule 1     Sig: Take 1 capsule by mouth twice daily,start by taking 1 capsule at bedtime x 5 nights, then 1 capsule twice daily     CONNOR:    11/05/2022 Pregabalin 50MG 1960 40 22 Blanca Damon [facility] 1

## 2022-11-21 NOTE — TELEPHONE ENCOUNTER
Patient also sent dotloop message requesting an increase in her dose. Please see below.     Happy Thanksgiving . I'm ready to try a higher dose of the Pregabalin as we discussed at my office visit. I haven't noticed any side effects. I have noticed some improvement but still have good days and then bad days. I do some sort of physical thereapy every day. I prefer the warm water swimming pool the most. I'm walking on the treadmill right now and my left leg is cramping like a b----. I'm not sure why? I need to feel better so I can rehabilitate after this injury. I'm just still not doing well. So let's try an increase on my meds and then go from there. Oh yeah, I got voted in to host Thanks Giving. Salvador!

## 2022-11-23 DIAGNOSIS — M47.816 LUMBAR SPONDYLOSIS: ICD-10-CM

## 2022-11-23 RX ORDER — MELOXICAM 15 MG/1
TABLET ORAL
Qty: 90 TABLET | Refills: 1 | Status: SHIPPED | OUTPATIENT
Start: 2022-11-23

## 2022-11-23 NOTE — TELEPHONE ENCOUNTER
"Medication was refilled by PRAMOD Damon on 11/21/2022. Please deny duplicate refill request.     \"E-Prescribing Status: Receipt confirmed by pharmacy (11/21/2022  5:11 PM EST)\"  "

## 2022-11-30 ENCOUNTER — TELEPHONE (OUTPATIENT)
Dept: NEUROSURGERY | Facility: CLINIC | Age: 62
End: 2022-11-30

## 2022-12-20 RX ORDER — AMLODIPINE BESYLATE 5 MG/1
TABLET ORAL
Qty: 30 TABLET | Refills: 3 | Status: SHIPPED | OUTPATIENT
Start: 2022-12-20 | End: 2023-03-13

## 2022-12-25 DIAGNOSIS — K21.00 GASTROESOPHAGEAL REFLUX DISEASE WITH ESOPHAGITIS WITHOUT HEMORRHAGE: ICD-10-CM

## 2022-12-27 RX ORDER — FAMOTIDINE 40 MG/1
TABLET, FILM COATED ORAL
Qty: 30 TABLET | Refills: 2 | OUTPATIENT
Start: 2022-12-27

## 2022-12-29 DIAGNOSIS — G47.09 OTHER INSOMNIA: ICD-10-CM

## 2022-12-29 RX ORDER — TRAZODONE HYDROCHLORIDE 50 MG/1
TABLET ORAL
Qty: 90 TABLET | Refills: 3 | Status: SHIPPED | OUTPATIENT
Start: 2022-12-29

## 2023-01-19 ENCOUNTER — OFFICE VISIT (OUTPATIENT)
Dept: PSYCHIATRY | Facility: CLINIC | Age: 63
End: 2023-01-19
Payer: MEDICAID

## 2023-01-19 VITALS
SYSTOLIC BLOOD PRESSURE: 124 MMHG | BODY MASS INDEX: 21.97 KG/M2 | WEIGHT: 109 LBS | HEART RATE: 76 BPM | HEIGHT: 59 IN | DIASTOLIC BLOOD PRESSURE: 72 MMHG

## 2023-01-19 DIAGNOSIS — G47.09 OTHER INSOMNIA: Chronic | ICD-10-CM

## 2023-01-19 DIAGNOSIS — M81.0 OSTEOPOROSIS, UNSPECIFIED OSTEOPOROSIS TYPE, UNSPECIFIED PATHOLOGICAL FRACTURE PRESENCE: ICD-10-CM

## 2023-01-19 DIAGNOSIS — F41.1 GENERALIZED ANXIETY DISORDER: Chronic | ICD-10-CM

## 2023-01-19 DIAGNOSIS — F32.1 CURRENT MODERATE EPISODE OF MAJOR DEPRESSIVE DISORDER, UNSPECIFIED WHETHER RECURRENT: Primary | Chronic | ICD-10-CM

## 2023-01-19 PROCEDURE — 99214 OFFICE O/P EST MOD 30 MIN: CPT | Performed by: NURSE PRACTITIONER

## 2023-01-19 RX ORDER — ALENDRONATE SODIUM 70 MG/1
TABLET ORAL
Qty: 12 TABLET | Refills: 3 | Status: SHIPPED | OUTPATIENT
Start: 2023-01-19

## 2023-01-19 RX ORDER — BUPROPION HYDROCHLORIDE 300 MG/1
300 TABLET ORAL DAILY
Qty: 90 TABLET | Refills: 1 | Status: SHIPPED | OUTPATIENT
Start: 2023-01-19

## 2023-01-19 NOTE — PROGRESS NOTES
"Chief Complaint  Depression, Anxiety, and Sleeping Problem    Subjective          Areli Barnett presents to [facility] for medication management of her depression, anxiety, sleeping difficulties.    History of Present Illness: Patient presents today for follow-up appointment after last being seen on 10/20/2022.  Patient says \"I am doing okay\".  She has started doing stretch classes again, which has been good for her body.  She says she is also swimming at the [facility] 3 days a week.  Patient says her depression \"tries to creep back up, but I have to push it back down\".  She says she feels she is doing well overall, and not having any significant difficulties.  She recently bought a new puppy, Etta and says she is enjoying having it.  She says she has been taking her Wellbutrin and Prozac on a daily basis, but says she has only been taking 40 mg of Prozac.  She says she rarely uses her trazodone, and generally sleeps well without it.  Patient denies any new or significant issues with her appetite.  Patient denies any SI/HI, A/V hallucinations.      The following portions of the patient's history were reviewed and updated as appropriate: allergies, current medications, past family history, past medical history, past social history, past surgical history and problem list.      Current Medications:   Current Outpatient Medications   Medication Sig Dispense Refill   • alendronate (FOSAMAX) 70 MG tablet TAKE ONE TABLET BY MOUTH EVERY 7 DAYS 12 tablet 3   • amLODIPine (NORVASC) 5 MG tablet TAKE ONE TABLET BY MOUTH EVERY DAY 30 tablet 3   • atorvastatin (LIPITOR) 20 MG tablet TAKE ONE TABLET BY MOUTH AT BEDTIME 30 tablet 11   • buPROPion XL (WELLBUTRIN XL) 300 MG 24 hr tablet Take 1 tablet by mouth Daily. 90 tablet 1   • clotrimazole-betamethasone (LOTRISONE) 1-0.05 % cream Apply  topically to the appropriate area as directed 2 (Two) Times a Day. (Patient taking differently: " Apply  topically to the appropriate area as directed 2 (Two) Times a Day. PRN) 15 g 0   • conjugated estrogens (Premarin) 0.625 MG/GM vaginal cream Use 0.5 grams intravaginally twice weekly to control symptoms. (Patient taking differently: Use 0.5 grams intravaginally twice weekly to control symptoms.    PRN) 1 each 11   • cyclobenzaprine (FLEXERIL) 10 MG tablet Take 1 tablet by mouth 3 (Three) Times a Day As Needed for Muscle Spasms. 30 tablet 2   • dicyclomine (BENTYL) 20 MG tablet Take 1 tablet by mouth Every 6 (Six) Hours. (Patient taking differently: Take 20 mg by mouth Every 6 (Six) Hours. PRN) 60 tablet 0   • famotidine (PEPCID) 40 MG tablet TAKE ONE TABLET BY MOUTH EVERY DAY AS NEEDED FOR indigestion OR heartburn 30 tablet 2   • FLUoxetine (PROzac) 20 MG capsule TAKE THREE CAPSULES BY MOUTH EVERY DAY (Patient taking differently: 2 capsules once a day) 90 capsule 5   • meloxicam (MOBIC) 15 MG tablet TAKE ONE TABLET BY MOUTH EVERY DAY WITH FOOD 90 tablet 1   • olopatadine (PATANOL) 0.1 % ophthalmic solution INSTILL ONE DROP IN EACH EYE TWICE DAILY 5 mL 5   • ondansetron ODT (ZOFRAN-ODT) 4 MG disintegrating tablet Place 1 tablet on the tongue Every 8 (Eight) Hours As Needed for Nausea or Vomiting. 30 tablet 1   • pregabalin (LYRICA) 75 MG capsule Take 1 capsule by mouth 2 (Two) Times a Day. 60 capsule 1   • traZODone (DESYREL) 50 MG tablet TAKE ONE TABLET BY MOUTH AT BEDTIME AS NEEDED FOR SLEEP 90 tablet 3   • tretinoin (Retin-A) 0.05 % cream Apply  topically to the appropriate area as directed At Night As Needed (acne). 20 g 2   • cetirizine (zyrTEC) 10 MG tablet TAKE ONE TABLET BY MOUTH AT BEDTIME (Patient taking differently: PRN) 30 tablet 5   • fluticasone (FLONASE) 50 MCG/ACT nasal spray 1 spray into the nostril(s) as directed by provider 2 (two) times a day. SHAKE LIQUID AND INSTILL 2 SPRAYS IN EACH NOSTRIL EVERY DAY (Patient taking differently: 1 spray into the nostril(s) as directed by provider. SHAKE  "LIQUID AND INSTILL 2 SPRAYS IN EACH NOSTRIL EVERY DAY PRN) 48 g 3     No current facility-administered medications for this visit.       Mental Status Exam:   Hygiene:   good  Cooperation:  Cooperative  Eye Contact:  Good  Psychomotor Behavior:  Appropriate  Affect:  Appropriate  Mood: euthymic  Speech:  Normal  Thought Process:  Goal directed  Thought Content:  Mood congruent  Suicidal:  None  Homicidal:  None  Hallucinations:  None  Delusion:  None  Memory:  Intact  Orientation:  Person, Place, Time and Situation  Reliability:  fair  Insight:  Good  Judgement:  Good  Impulse Control:  Good  Physical/Medical Issues:  HLD, GERD, plantar fasciitis       Objective   Vital Signs:   /72   Pulse 76   Ht 149.9 cm (59\")   Wt 49.4 kg (109 lb)   BMI 22.02 kg/m²     Physical Exam  Neurological:      Mental Status: She is oriented to person, place, and time. Mental status is at baseline.      Coordination: Coordination is intact.      Gait: Gait is intact.   Psychiatric:         Behavior: Behavior is cooperative.        Result Review :     The following data was reviewed by: JAI Gillette on 01/19/2023:    Data reviewed: Previous note, medication history          Assessment and Plan    Diagnoses and all orders for this visit:    1. Current moderate episode of major depressive disorder, unspecified whether recurrent (HCC) (Primary)  -     buPROPion XL (WELLBUTRIN XL) 300 MG 24 hr tablet; Take 1 tablet by mouth Daily.  Dispense: 90 tablet; Refill: 1    2. Generalized anxiety disorder    3. Other insomnia         PHQ-9 Score:   PHQ-9 Total Score: 11      Depression Screening:  Patient screened positive for depression based on a PHQ-9 score of 11 on 1/19/2023. Follow-up recommendations include: Prescribed antidepressant medication treatment and Suicide Risk Assessment performed.        Tobacco Cessation:  Patient is a former tobacco user. No tobacco cessation education necessary.      Impression/Plan:  -This is a " "follow-up appointment.  Patient presents today and reports she has been doing okay overall since her last appointment.  She reports taking her medication and denies any adverse effects associated with them.  Patient does state she had only been taking 40 mg of her Prozac.  When asked if there was reason for this she says \"I do not know, sometimes I think less is more\".  Discussed the importance of taking medication as prescribed.  Patient reports she will increase back to the 60 mg she is supposed to be taking.  She does feel her medication regimen has been working well for her mood and anxiety and denies any significant concerns today.  -Maintain Wellbutrin  mg daily.  -Maintain Prozac 60 mg daily.  -Maintain trazodone 50 mg nightly as needed.  Patient only takes this medication when needed, and does not need it very often.  -Patient's CONNOR report reviewed and deemed appropriate.  Patient counseled on use of controlled substances.  -Reviewed available lab work.  -Schedule follow-up appointment for 12 weeks or as needed.      MEDS ORDERED DURING VISIT:  New Medications Ordered This Visit   Medications   • buPROPion XL (WELLBUTRIN XL) 300 MG 24 hr tablet     Sig: Take 1 tablet by mouth Daily.     Dispense:  90 tablet     Refill:  1     This prescription was filled on 6/14/2022. Any refills authorized will be placed on file.         Follow Up   Return in about 12 weeks (around 4/13/2023), or if symptoms worsen or fail to improve, for Next scheduled follow up, In-Person Appt.  Patient was given instructions and counseling regarding her condition or for health maintenance advice. Please see specific information pulled into the AVS if appropriate.       TREATMENT PLAN/GOALS: Continue supportive psychotherapy efforts and medications as indicated. Treatment and medication options discussed during today's visit. Patient acknowledged and verbally consented to continue with current treatment plan and was educated on " the importance of compliance with treatment and follow-up appointments.    MEDICATION ISSUES:  Discussed medication options and treatment plan of prescribed medication as well as the risks, benefits, and side effects including potential falls, possible impaired driving and metabolic adversities among others. Patient is agreeable to call the office with any worsening of symptoms or onset of side effects. Patient is agreeable to call 911 or go to the nearest ER should he/she begin having SI/HI.          This document has been electronically signed by JAI Scott, PMHNP-BC  January 19, 2023 15:46 EST      Part of this note may be an electronic transcription/translation of spoken language to printed text using the Dragon Dictation System.

## 2023-01-21 DIAGNOSIS — M51.36 DDD (DEGENERATIVE DISC DISEASE), LUMBAR: ICD-10-CM

## 2023-01-21 DIAGNOSIS — M47.816 LUMBAR FACET ARTHROPATHY: ICD-10-CM

## 2023-01-21 DIAGNOSIS — M47.9 OSTEOARTHRITIS OF SPINE, UNSPECIFIED SPINAL OSTEOARTHRITIS COMPLICATION STATUS, UNSPECIFIED SPINAL REGION: ICD-10-CM

## 2023-01-23 RX ORDER — PREGABALIN 75 MG/1
CAPSULE ORAL
Qty: 60 CAPSULE | Refills: 1 | Status: SHIPPED | OUTPATIENT
Start: 2023-01-23 | End: 2023-03-27

## 2023-01-23 NOTE — TELEPHONE ENCOUNTER
Provider:  Marisol  Surgery:  N/A  Surgery Date:  N/A  Last visit:  Office Visit with Blanca Damon PA-C (10/06/2022)  Next visit: TBD  Last filled: 11/21/2022      Vicente:     11/05/2022 Pregabalin 50MG 1960 40 22 Blanca Damon [facility] 1  11/29/2022 Pregabalin 75MG 1960 60 30 Blanca Damon [facility] 1  12/29/2022 Pregabalin 75MG 1960 60 30 Blanca Damon [facility] 1    Requested Prescriptions     Pending Prescriptions Disp Refills   • pregabalin (LYRICA) 75 MG capsule [Pharmacy Med Name: pregabalin 75 mg capsule] 60 capsule 1     Sig: TAKE ONE CAPSULE BY MOUTH TWICE DAILY

## 2023-03-10 DIAGNOSIS — K21.00 GASTROESOPHAGEAL REFLUX DISEASE WITH ESOPHAGITIS WITHOUT HEMORRHAGE: ICD-10-CM

## 2023-03-10 RX ORDER — FAMOTIDINE 40 MG/1
TABLET, FILM COATED ORAL
Qty: 30 TABLET | Refills: 2 | OUTPATIENT
Start: 2023-03-10

## 2023-03-13 RX ORDER — AMLODIPINE BESYLATE 5 MG/1
TABLET ORAL
Qty: 30 TABLET | Refills: 3 | Status: SHIPPED | OUTPATIENT
Start: 2023-03-13

## 2023-03-14 DIAGNOSIS — F32.1 CURRENT MODERATE EPISODE OF MAJOR DEPRESSIVE DISORDER, UNSPECIFIED WHETHER RECURRENT: Chronic | ICD-10-CM

## 2023-03-14 RX ORDER — BUPROPION HYDROCHLORIDE 300 MG/1
TABLET ORAL
Qty: 90 TABLET | Refills: 1 | OUTPATIENT
Start: 2023-03-14

## 2023-03-24 DIAGNOSIS — M47.9 OSTEOARTHRITIS OF SPINE, UNSPECIFIED SPINAL OSTEOARTHRITIS COMPLICATION STATUS, UNSPECIFIED SPINAL REGION: ICD-10-CM

## 2023-03-24 DIAGNOSIS — M51.36 DDD (DEGENERATIVE DISC DISEASE), LUMBAR: ICD-10-CM

## 2023-03-24 DIAGNOSIS — M47.816 LUMBAR FACET ARTHROPATHY: ICD-10-CM

## 2023-03-24 NOTE — TELEPHONE ENCOUNTER
Provider:  Marisol  Surgery/Procedure:  RUPAIL  Surgery/Procedure Date:    Last visit:   10/6/22  Next visit: NA     Reason for call: Refill request for Lyrica pended. Does patient need a follow up or should we have her PCP fill? Was only seen once in office on 10/6/22 and no follow up appointments placed.     Office Visit with Blanca Damon PA-C (10/06/2022)     Vicente is still unavailable.

## 2023-03-27 RX ORDER — PREGABALIN 75 MG/1
CAPSULE ORAL
Qty: 60 CAPSULE | Refills: 1 | Status: SHIPPED | OUTPATIENT
Start: 2023-03-27

## 2023-03-29 DIAGNOSIS — K21.00 GASTROESOPHAGEAL REFLUX DISEASE WITH ESOPHAGITIS WITHOUT HEMORRHAGE: ICD-10-CM

## 2023-03-29 RX ORDER — FAMOTIDINE 40 MG/1
TABLET, FILM COATED ORAL
Qty: 30 TABLET | Refills: 2 | Status: SHIPPED | OUTPATIENT
Start: 2023-03-29

## 2023-03-29 RX ORDER — CYCLOBENZAPRINE HCL 10 MG
TABLET ORAL
Qty: 30 TABLET | Refills: 2 | Status: SHIPPED | OUTPATIENT
Start: 2023-03-29

## 2023-03-31 DIAGNOSIS — J30.1 SEASONAL ALLERGIC RHINITIS DUE TO POLLEN: ICD-10-CM

## 2023-03-31 RX ORDER — FLUTICASONE PROPIONATE 50 MCG
SPRAY, SUSPENSION (ML) NASAL
Qty: 48 G | Refills: 3 | Status: SHIPPED | OUTPATIENT
Start: 2023-03-31

## 2023-03-31 NOTE — TELEPHONE ENCOUNTER
Rx Refill Note  Requested Prescriptions     Pending Prescriptions Disp Refills   • fluticasone (FLONASE) 50 MCG/ACT nasal spray [Pharmacy Med Name: fluticasone propionate 50 mcg/actuation nasal spray,suspension] 48 g 3     Sig: INHALE 1 SPRAY IN EACH NOSTRIL TWICE DAILY shake WELL BEFORE using      Last office visit with prescribing clinician: 10/25/2022   Last telemedicine visit with prescribing clinician: 4/25/2023   Next office visit with prescribing clinician: 4/25/2023                         Would you like a call back once the refill request has been completed: [] Yes [] No    If the office needs to give you a call back, can they leave a voicemail: [] Yes [] No    Quang Tai MA  03/31/23, 10:03 EDT

## 2023-04-04 ENCOUNTER — PRIOR AUTHORIZATION (OUTPATIENT)
Dept: INTERNAL MEDICINE | Facility: CLINIC | Age: 63
End: 2023-04-04
Payer: MEDICAID

## 2023-04-13 ENCOUNTER — OFFICE VISIT (OUTPATIENT)
Dept: PSYCHIATRY | Facility: CLINIC | Age: 63
End: 2023-04-13
Payer: MEDICAID

## 2023-04-13 VITALS
HEART RATE: 88 BPM | DIASTOLIC BLOOD PRESSURE: 70 MMHG | SYSTOLIC BLOOD PRESSURE: 118 MMHG | HEIGHT: 59 IN | WEIGHT: 101 LBS | BODY MASS INDEX: 20.36 KG/M2

## 2023-04-13 DIAGNOSIS — F32.1 CURRENT MODERATE EPISODE OF MAJOR DEPRESSIVE DISORDER, UNSPECIFIED WHETHER RECURRENT: Primary | Chronic | ICD-10-CM

## 2023-04-13 DIAGNOSIS — G47.09 OTHER INSOMNIA: Chronic | ICD-10-CM

## 2023-04-13 DIAGNOSIS — F41.1 GENERALIZED ANXIETY DISORDER: Chronic | ICD-10-CM

## 2023-04-13 PROCEDURE — 1159F MED LIST DOCD IN RCRD: CPT | Performed by: NURSE PRACTITIONER

## 2023-04-13 PROCEDURE — 3074F SYST BP LT 130 MM HG: CPT | Performed by: NURSE PRACTITIONER

## 2023-04-13 PROCEDURE — 99214 OFFICE O/P EST MOD 30 MIN: CPT | Performed by: NURSE PRACTITIONER

## 2023-04-13 PROCEDURE — 3078F DIAST BP <80 MM HG: CPT | Performed by: NURSE PRACTITIONER

## 2023-04-13 PROCEDURE — 1160F RVW MEDS BY RX/DR IN RCRD: CPT | Performed by: NURSE PRACTITIONER

## 2023-04-13 RX ORDER — BUPROPION HYDROCHLORIDE 300 MG/1
300 TABLET ORAL DAILY
Qty: 90 TABLET | Refills: 3 | Status: SHIPPED | OUTPATIENT
Start: 2023-04-13

## 2023-04-13 RX ORDER — FLUOXETINE HYDROCHLORIDE 40 MG/1
40 CAPSULE ORAL DAILY
Qty: 90 CAPSULE | Refills: 3 | Status: SHIPPED | OUTPATIENT
Start: 2023-04-13

## 2023-04-13 RX ORDER — FLUOXETINE HYDROCHLORIDE 20 MG/1
20 CAPSULE ORAL DAILY
Qty: 90 CAPSULE | Refills: 3 | Status: SHIPPED | OUTPATIENT
Start: 2023-04-13

## 2023-04-13 NOTE — PROGRESS NOTES
"Chief Complaint  Anxiety, Depression, and Sleeping Problem    Subjective          Areli Barnett presents to [facility] for medication management of her anxiety, depression, sleeping difficulties.    History of Present Illness: Patient presents today for follow-up appointments after last being seen on 01/19/2023.  Patient says \"I am doing really well\".  She says she is staying active and is still swimming at the [facility] on an almost daily basis.  She says aside from that she is not doing much other than spending time with her new puppy.  Patient says \"I am just eat up with her\".  She has continued to take her medications on a daily basis and says \"I am never minutes a day.  I am doing too well\".  She says she continues to be very happy with her regimen and how well she is doing on it.  She says she is sleeping and eating well and denies issues with either.  Patient says she has not needed her trazodone since her last appointment, and says she has no difficulties with getting to sleep or staying asleep.  Patient denies any SI/HI, A/V hallucinations.      The following portions of the patient's history were reviewed and updated as appropriate: allergies, current medications, past family history, past medical history, past social history, past surgical history and problem list.      Current Medications:   Current Outpatient Medications   Medication Sig Dispense Refill   • alendronate (FOSAMAX) 70 MG tablet TAKE ONE TABLET BY MOUTH EVERY 7 DAYS 12 tablet 3   • amLODIPine (NORVASC) 5 MG tablet TAKE ONE TABLET BY MOUTH EVERY DAY 30 tablet 3   • atorvastatin (LIPITOR) 20 MG tablet TAKE ONE TABLET BY MOUTH AT BEDTIME 30 tablet 11   • buPROPion XL (WELLBUTRIN XL) 300 MG 24 hr tablet Take 1 tablet by mouth Daily. 90 tablet 3   • cetirizine (zyrTEC) 10 MG tablet TAKE ONE TABLET BY MOUTH AT BEDTIME (Patient taking differently: PRN) 30 tablet 5   • clotrimazole-betamethasone (LOTRISONE) " 1-0.05 % cream Apply  topically to the appropriate area as directed 2 (Two) Times a Day. (Patient taking differently: Apply  topically to the appropriate area as directed 2 (Two) Times a Day. PRN) 15 g 0   • conjugated estrogens (Premarin) 0.625 MG/GM vaginal cream Use 0.5 grams intravaginally twice weekly to control symptoms. (Patient taking differently: Use 0.5 grams intravaginally twice weekly to control symptoms.    PRN) 1 each 11   • cyclobenzaprine (FLEXERIL) 10 MG tablet TAKE ONE TABLET BY MOUTH THREE TIMES DAILY AS NEEDED FOR MUSCLE SPASMS 30 tablet 2   • dicyclomine (BENTYL) 20 MG tablet Take 1 tablet by mouth Every 6 (Six) Hours. (Patient taking differently: Take 1 tablet by mouth Every 6 (Six) Hours. PRN) 60 tablet 0   • famotidine (PEPCID) 40 MG tablet TAKE ONE TABLET BY MOUTH EVERY DAY AS NEEDED FOR INDIGESTION OR HEARTBURN 30 tablet 2   • FLUoxetine (PROzac) 20 MG capsule Take 1 capsule by mouth Daily. 90 capsule 3   • fluticasone (FLONASE) 50 MCG/ACT nasal spray INHALE 1 SPRAY IN EACH NOSTRIL TWICE DAILY shake WELL BEFORE using 48 g 3   • meloxicam (MOBIC) 15 MG tablet TAKE ONE TABLET BY MOUTH EVERY DAY WITH FOOD 90 tablet 1   • olopatadine (PATANOL) 0.1 % ophthalmic solution INSTILL ONE DROP IN EACH EYE TWICE DAILY 5 mL 5   • ondansetron ODT (ZOFRAN-ODT) 4 MG disintegrating tablet Place 1 tablet on the tongue Every 8 (Eight) Hours As Needed for Nausea or Vomiting. 30 tablet 1   • pregabalin (LYRICA) 75 MG capsule TAKE ONE CAPSULE BY MOUTH TWICE DAILY 60 capsule 1   • traZODone (DESYREL) 50 MG tablet TAKE ONE TABLET BY MOUTH AT BEDTIME AS NEEDED FOR SLEEP 90 tablet 3   • tretinoin (Retin-A) 0.05 % cream Apply  topically to the appropriate area as directed At Night As Needed (acne). 20 g 2   • FLUoxetine (PROzac) 40 MG capsule Take 1 capsule by mouth Daily. 90 capsule 3     No current facility-administered medications for this visit.       Mental Status Exam:   Hygiene:   good  Cooperation:   "Cooperative  Eye Contact:  Good  Psychomotor Behavior:  Appropriate  Affect:  Appropriate  Mood: euthymic  Speech:  Normal  Thought Process:  Goal directed  Thought Content:  Mood congruent  Suicidal:  None  Homicidal:  None  Hallucinations:  None  Delusion:  None  Memory:  Intact  Orientation:  Person, Place, Time and Situation  Reliability:  good  Insight:  Good  Judgement:  Good  Impulse Control:  Good  Physical/Medical Issues:  HLD, GERD, plantar fasciitis        Objective   Vital Signs:   /70   Pulse 88   Ht 149.9 cm (59\")   Wt 45.8 kg (101 lb)   BMI 20.40 kg/m²     Physical Exam  Neurological:      Mental Status: She is oriented to person, place, and time. Mental status is at baseline.      Coordination: Coordination is intact.      Gait: Gait is intact.   Psychiatric:         Behavior: Behavior is cooperative.        Result Review :     The following data was reviewed by: JAI Gillette on 04/13/2023:    Data reviewed: Previous note, medication history          Assessment and Plan    Diagnoses and all orders for this visit:    1. Current moderate episode of major depressive disorder, unspecified whether recurrent (Primary)  -     FLUoxetine (PROzac) 20 MG capsule; Take 1 capsule by mouth Daily.  Dispense: 90 capsule; Refill: 3  -     FLUoxetine (PROzac) 40 MG capsule; Take 1 capsule by mouth Daily.  Dispense: 90 capsule; Refill: 3  -     buPROPion XL (WELLBUTRIN XL) 300 MG 24 hr tablet; Take 1 tablet by mouth Daily.  Dispense: 90 tablet; Refill: 3    2. Generalized anxiety disorder  -     FLUoxetine (PROzac) 20 MG capsule; Take 1 capsule by mouth Daily.  Dispense: 90 capsule; Refill: 3  -     FLUoxetine (PROzac) 40 MG capsule; Take 1 capsule by mouth Daily.  Dispense: 90 capsule; Refill: 3    3. Other insomnia         PHQ-9 Score:   PHQ-9 Total Score: 0      Depression Screening:  Patient screened positive for depression based on a PHQ-9 score of 0 on 4/13/2023. Follow-up recommendations " include: Prescribed antidepressant medication treatment and Suicide Risk Assessment performed.        Tobacco Cessation:  Patient is a former tobacco user. No tobacco cessation education necessary.      Impression/Plan:  -This is a follow-up appointment.  Patient presents today and reports she has been doing well overall since her last appointment.  She continues to take her medication daily, as prescribed.  She denies any adverse effects associated with them.  She is very happy still with her current medication regimen and how well she has been doing.  Patient says she has no issues or concerns today.  -Maintain Wellbutrin  mg daily.  -Maintain Prozac 60 mg daily.  -Maintain trazodone 50 mg nightly as needed.  -Patient's CONNOR report reviewed and deemed appropriate.  Patient counseled on use of controlled substances.  -Reviewed available lab work.  -Schedule follow-up appointment for 6 months or as needed.      MEDS ORDERED DURING VISIT:  New Medications Ordered This Visit   Medications   • FLUoxetine (PROzac) 20 MG capsule     Sig: Take 1 capsule by mouth Daily.     Dispense:  90 capsule     Refill:  3     This prescription was filled on 8/5/2022. Any refills authorized will be placed on file.   • FLUoxetine (PROzac) 40 MG capsule     Sig: Take 1 capsule by mouth Daily.     Dispense:  90 capsule     Refill:  3   • buPROPion XL (WELLBUTRIN XL) 300 MG 24 hr tablet     Sig: Take 1 tablet by mouth Daily.     Dispense:  90 tablet     Refill:  3     This prescription was filled on 6/14/2022. Any refills authorized will be placed on file.         Follow Up   Return in about 6 months (around 10/13/2023), or if symptoms worsen or fail to improve, for Next scheduled follow up, In-Person Appt.  Patient was given instructions and counseling regarding her condition or for health maintenance advice. Please see specific information pulled into the AVS if appropriate.       TREATMENT PLAN/GOALS: Continue supportive  psychotherapy efforts and medications as indicated. Treatment and medication options discussed during today's visit. Patient acknowledged and verbally consented to continue with current treatment plan and was educated on the importance of compliance with treatment and follow-up appointments.    MEDICATION ISSUES:  Discussed medication options and treatment plan of prescribed medication as well as the risks, benefits, and side effects including potential falls, possible impaired driving and metabolic adversities among others. Patient is agreeable to call the office with any worsening of symptoms or onset of side effects. Patient is agreeable to call 911 or go to the nearest ER should he/she begin having SI/HI.          This document has been electronically signed by JAI Scott, PMHNP-BC  April 13, 2023 15:59 EDT      Part of this note may be an electronic transcription/translation of spoken language to printed text using the Dragon Dictation System.

## 2023-04-25 ENCOUNTER — OFFICE VISIT (OUTPATIENT)
Dept: INTERNAL MEDICINE | Facility: CLINIC | Age: 63
End: 2023-04-25
Payer: MEDICAID

## 2023-04-25 VITALS
TEMPERATURE: 97.7 F | WEIGHT: 105 LBS | DIASTOLIC BLOOD PRESSURE: 66 MMHG | SYSTOLIC BLOOD PRESSURE: 106 MMHG | BODY MASS INDEX: 21.17 KG/M2 | HEART RATE: 72 BPM | HEIGHT: 59 IN

## 2023-04-25 DIAGNOSIS — M51.16 LUMBAR DISC HERNIATION WITH RADICULOPATHY: Chronic | ICD-10-CM

## 2023-04-25 DIAGNOSIS — I10 ESSENTIAL HYPERTENSION: Primary | Chronic | ICD-10-CM

## 2023-04-25 DIAGNOSIS — E78.2 MIXED HYPERLIPIDEMIA: ICD-10-CM

## 2023-04-25 PROBLEM — M48.062 LUMBAR STENOSIS WITH NEUROGENIC CLAUDICATION: Chronic | Status: ACTIVE | Noted: 2022-01-04

## 2023-04-25 PROCEDURE — 3078F DIAST BP <80 MM HG: CPT | Performed by: STUDENT IN AN ORGANIZED HEALTH CARE EDUCATION/TRAINING PROGRAM

## 2023-04-25 PROCEDURE — 99214 OFFICE O/P EST MOD 30 MIN: CPT | Performed by: STUDENT IN AN ORGANIZED HEALTH CARE EDUCATION/TRAINING PROGRAM

## 2023-04-25 PROCEDURE — 3074F SYST BP LT 130 MM HG: CPT | Performed by: STUDENT IN AN ORGANIZED HEALTH CARE EDUCATION/TRAINING PROGRAM

## 2023-04-25 PROCEDURE — 1160F RVW MEDS BY RX/DR IN RCRD: CPT | Performed by: STUDENT IN AN ORGANIZED HEALTH CARE EDUCATION/TRAINING PROGRAM

## 2023-04-25 PROCEDURE — 1159F MED LIST DOCD IN RCRD: CPT | Performed by: STUDENT IN AN ORGANIZED HEALTH CARE EDUCATION/TRAINING PROGRAM

## 2023-04-25 NOTE — PROGRESS NOTES
"Chief Complaint  Areli Barnett is a 63 y.o. female presenting for Hypertension.     Patient has a past medical history of allergic rhinitis, hyperlipidemia, hypertension, palpitations, GERD, esophageal stricture, Helicobacter pylori, anxiety, depression, osteoporosis, osteoarthritis including degenerative changes of the spine with right L4-5 R severe foraminal stenosis w/radiulopathy.    History of Present Illness  Patient is here for follow-up of her hypertension.    Patient tells me her pain is currently well controlled.  Neurosurgery started on Lyrica and she is taking 75 mg twice daily.  She is aware that I cannot prescribe her controlled medications due to previous history, and she might need to schedule follow-up with neurosurgery for continued refills.  She reports still having some lower back pain, but overall much improved.  She is physically active, which helps control her pain.    The following portions of the patient's history were reviewed and updated as appropriate: allergies, current medications, past family history, past medical history, past social history, past surgical history and problem list.    Objective  /66 (BP Location: Left arm, Patient Position: Sitting, Cuff Size: Adult)   Pulse 72   Temp 97.7 °F (36.5 °C) (Temporal)   Ht 149.9 cm (59.02\")   Wt 47.6 kg (105 lb)   LMP  (LMP Unknown)   BMI 21.20 kg/m²     Physical Exam  Vitals reviewed.   Constitutional:       Appearance: Normal appearance.   HENT:      Head: Normocephalic and atraumatic.      Nose: No congestion.   Eyes:      Extraocular Movements: Extraocular movements intact.      Conjunctiva/sclera: Conjunctivae normal.   Cardiovascular:      Rate and Rhythm: Normal rate and regular rhythm.      Heart sounds: Normal heart sounds. No murmur heard.  Pulmonary:      Effort: Pulmonary effort is normal.      Breath sounds: Normal breath sounds.   Musculoskeletal:      Cervical back: Neck supple.      Right lower leg: No edema. "      Left lower leg: No edema.   Skin:     General: Skin is warm and dry.   Neurological:      Mental Status: She is alert and oriented to person, place, and time. Mental status is at baseline.   Psychiatric:         Behavior: Behavior normal.         Thought Content: Thought content normal.         Assessment/Plan   1. Essential hypertension  BP Readings from Last 3 Encounters:   04/25/23 106/66   04/13/23 118/70   01/19/23 124/72   Blood pressure well controlled.  Continue on amlodipine 5 mg.    2. Lumbar disc herniation with radiculopathy  Overall improved over time, doing well on Lyrica.  I have recommended she reach out to neurosurgery to schedule follow-up appointment if needed for continued prescriptions of Lyrica.  Continue self exercises and activity as tolerated.    3. Mixed hyperlipidemia  The 10-year ASCVD risk score (Ellen DK, et al., 2019) is: 2.1%    Values used to calculate the score:      Age: 63 years      Sex: Female      Is Non- : No      Diabetic: No      Tobacco smoker: No      Systolic Blood Pressure: 106 mmHg      Is BP treated: Yes      HDL Cholesterol: 99 mg/dL      Total Cholesterol: 170 mg/dL  Patient did ask about continued use of cholesterol-lowering medication.  With her low risk score it is an option to reduce the dose, but after discussion with patient she wants to continue on atorvastatin 20 mg.  She has no history of cardiovascular disease.      BMI is within normal parameters. No other follow-up for BMI required.      Return in about 6 months (around 10/25/2023) for Annual physical.    Future Appointments       Provider Department Center    10/17/2023 3:30 PM (Arrive by 3:00 PM) Ricardo Matt APRN [facility]    10/31/2023 1:15 PM Salvatore Townsend MD [facility] MEDICINE [facility]            Salvatore Townsend MD  Family Medicine  04/25/2023

## 2023-05-04 DIAGNOSIS — F32.1 CURRENT MODERATE EPISODE OF MAJOR DEPRESSIVE DISORDER, UNSPECIFIED WHETHER RECURRENT: Chronic | ICD-10-CM

## 2023-05-04 DIAGNOSIS — F41.1 GENERALIZED ANXIETY DISORDER: Chronic | ICD-10-CM

## 2023-05-04 RX ORDER — FLUOXETINE HYDROCHLORIDE 20 MG/1
CAPSULE ORAL
Qty: 90 CAPSULE | Refills: 1 | Status: SHIPPED | OUTPATIENT
Start: 2023-05-04

## 2023-05-06 DIAGNOSIS — M51.36 DDD (DEGENERATIVE DISC DISEASE), LUMBAR: ICD-10-CM

## 2023-05-06 DIAGNOSIS — M47.816 LUMBAR FACET ARTHROPATHY: ICD-10-CM

## 2023-05-06 DIAGNOSIS — M47.9 OSTEOARTHRITIS OF SPINE, UNSPECIFIED SPINAL OSTEOARTHRITIS COMPLICATION STATUS, UNSPECIFIED SPINAL REGION: ICD-10-CM

## 2023-05-08 RX ORDER — PREGABALIN 75 MG/1
CAPSULE ORAL
Qty: 60 CAPSULE | Refills: 1 | OUTPATIENT
Start: 2023-05-08

## 2023-05-08 NOTE — TELEPHONE ENCOUNTER
Provider:  Marisol  Surgery/Procedure:  RUPALI  Surgery/Procedure Date:    Last visit:   10/6/22  Next visit: NA     Reason for call:  1 05/05/2023 [id number] Pregabalin 75MG Blanca Damon [employer]   [facility]  [address] [place]  Date Written New/Refill Dosage Form Prescriber [facility]  03/27/2023 Refill [employer]  Pat ID Date Filled RX # Drug Name Prescriber Name Dispenser Name Qty Days MED PMT Rpt To  1 03/29/2023 [id number] Pregabalin 75MG Blanca Damon [employer] [facility]  [address] [place]  Date Written New/Refill Dosage Form Prescriber [facility]  03/27/2023 New [employer]  Pat ID Date Filled RX # Drug Name Prescriber Name Dispenser Name Qty Days MED PMT Rpt To  1 03/01/2023 [id number] Pregabalin 75MG Blanca Damon [employer] [facility]  [address] [place]  Date Written New/Refill Dosage Form Prescriber [facility]  01/23/2023 Refill [employer]  Pat ID Date Filled RX # Drug Name Prescriber Name Dispenser Na    Requested Prescriptions     Pending Prescriptions Disp Refills   • pregabalin (LYRICA) 75 MG capsule [Pharmacy Med Name: pregabalin 75 mg capsule] 60 capsule 1     Sig: TAKE ONE CAPSULE BY MOUTH TWICE DAILY   ;

## 2023-05-23 DIAGNOSIS — M47.816 LUMBAR SPONDYLOSIS: ICD-10-CM

## 2023-05-23 RX ORDER — MELOXICAM 15 MG/1
TABLET ORAL
Qty: 90 TABLET | Refills: 1 | Status: SHIPPED | OUTPATIENT
Start: 2023-05-23

## 2023-05-23 NOTE — TELEPHONE ENCOUNTER
Rx Refill Note  Requested Prescriptions     Pending Prescriptions Disp Refills   • meloxicam (MOBIC) 15 MG tablet [Pharmacy Med Name: meloxicam 15 mg tablet] 90 tablet 1     Sig: TAKE ONE TABLET BY MOUTH EVERY DAY WITH food      Last office visit with prescribing clinician: 4/25/2023   Last telemedicine visit with prescribing clinician: Visit date not found   Next office visit with prescribing clinician: 10/31/2023                         Would you like a call back once the refill request has been completed: [] Yes [] No    If the office needs to give you a call back, can they leave a voicemail: [] Yes [] No    Laurie Hernandez LPN  05/23/23, 09:57 EDT

## 2023-06-05 RX ORDER — AMLODIPINE BESYLATE 5 MG/1
TABLET ORAL
Qty: 30 TABLET | Refills: 3 | Status: SHIPPED | OUTPATIENT
Start: 2023-06-05

## 2023-06-07 RX ORDER — CETIRIZINE HYDROCHLORIDE 10 MG/1
TABLET ORAL
Qty: 30 TABLET | Refills: 5 | Status: SHIPPED | OUTPATIENT
Start: 2023-06-07

## 2023-06-08 DIAGNOSIS — M47.9 OSTEOARTHRITIS OF SPINE, UNSPECIFIED SPINAL OSTEOARTHRITIS COMPLICATION STATUS, UNSPECIFIED SPINAL REGION: ICD-10-CM

## 2023-06-08 DIAGNOSIS — M51.36 DDD (DEGENERATIVE DISC DISEASE), LUMBAR: ICD-10-CM

## 2023-06-08 DIAGNOSIS — M47.816 LUMBAR FACET ARTHROPATHY: Primary | ICD-10-CM

## 2023-06-08 RX ORDER — PREGABALIN 75 MG/1
75 CAPSULE ORAL 2 TIMES DAILY
Qty: 60 CAPSULE | Refills: 2 | Status: SHIPPED | OUTPATIENT
Start: 2023-06-08

## 2023-06-11 DIAGNOSIS — H10.13 ALLERGIC CONJUNCTIVITIS OF BOTH EYES: ICD-10-CM

## 2023-06-12 DIAGNOSIS — M51.36 DDD (DEGENERATIVE DISC DISEASE), LUMBAR: ICD-10-CM

## 2023-06-12 DIAGNOSIS — M54.41 CHRONIC RIGHT-SIDED LOW BACK PAIN WITH RIGHT-SIDED SCIATICA: ICD-10-CM

## 2023-06-12 DIAGNOSIS — M47.9 OSTEOARTHRITIS OF SPINE, UNSPECIFIED SPINAL OSTEOARTHRITIS COMPLICATION STATUS, UNSPECIFIED SPINAL REGION: ICD-10-CM

## 2023-06-12 DIAGNOSIS — G89.29 CHRONIC RIGHT-SIDED LOW BACK PAIN WITH RIGHT-SIDED SCIATICA: ICD-10-CM

## 2023-06-12 DIAGNOSIS — M47.816 LUMBAR FACET ARTHROPATHY: Primary | ICD-10-CM

## 2023-06-12 RX ORDER — OLOPATADINE HYDROCHLORIDE 1 MG/ML
SOLUTION/ DROPS OPHTHALMIC
Qty: 5 ML | Refills: 5 | Status: SHIPPED | OUTPATIENT
Start: 2023-06-12

## 2023-06-12 NOTE — TELEPHONE ENCOUNTER
Rx Refill Note  Requested Prescriptions     Pending Prescriptions Disp Refills    olopatadine (PATANOL) 0.1 % ophthalmic solution [Pharmacy Med Name: olopatadine 0.1 % eye drops] 5 mL 5     Sig: Instill ONE drop IN EACH EYE TWICE DAILY      Last office visit with prescribing clinician: 4/25/2023   Last telemedicine visit with prescribing clinician: Visit date not found   Next office visit with prescribing clinician: 10/31/2023                             Kaitlynn Vazquez RN  06/12/23, 10:14 EDT

## 2023-06-16 DIAGNOSIS — F41.1 GENERALIZED ANXIETY DISORDER: Chronic | ICD-10-CM

## 2023-06-16 DIAGNOSIS — F32.1 CURRENT MODERATE EPISODE OF MAJOR DEPRESSIVE DISORDER, UNSPECIFIED WHETHER RECURRENT: Chronic | ICD-10-CM

## 2023-06-16 RX ORDER — FLUOXETINE HYDROCHLORIDE 20 MG/1
CAPSULE ORAL
Qty: 90 CAPSULE | Refills: 1 | OUTPATIENT
Start: 2023-06-16

## 2023-06-16 NOTE — TELEPHONE ENCOUNTER
Rx Refill Note  Requested Prescriptions     Pending Prescriptions Disp Refills    FLUoxetine (PROzac) 20 MG capsule [Pharmacy Med Name: fluoxetine 20 mg capsule] 90 capsule 1     Sig: TAKE THREE CAPSULES BY MOUTH EVERY DAY      Last office visit with prescribing clinician: 4/25/2023   Last telemedicine visit with prescribing clinician: Visit date not found   Next office visit with prescribing clinician: 10/31/2023                         Would you like a call back once the refill request has been completed: [] Yes [] No    If the office needs to give you a call back, can they leave a voicemail: [] Yes [] No    Laurie Hernandez LPN  06/16/23, 09:04 EDT

## 2023-07-28 DIAGNOSIS — K21.00 GASTROESOPHAGEAL REFLUX DISEASE WITH ESOPHAGITIS WITHOUT HEMORRHAGE: ICD-10-CM

## 2023-07-28 RX ORDER — FAMOTIDINE 40 MG/1
TABLET, FILM COATED ORAL
Qty: 30 TABLET | Refills: 2 | OUTPATIENT
Start: 2023-07-28

## 2023-08-16 DIAGNOSIS — F32.1 CURRENT MODERATE EPISODE OF MAJOR DEPRESSIVE DISORDER, UNSPECIFIED WHETHER RECURRENT: Chronic | ICD-10-CM

## 2023-08-16 DIAGNOSIS — F41.1 GENERALIZED ANXIETY DISORDER: Chronic | ICD-10-CM

## 2023-08-16 RX ORDER — FLUOXETINE HYDROCHLORIDE 20 MG/1
CAPSULE ORAL
Qty: 90 CAPSULE | Refills: 1 | Status: SHIPPED | OUTPATIENT
Start: 2023-08-16

## 2023-08-16 NOTE — TELEPHONE ENCOUNTER
Rx Refill Note  Requested Prescriptions     Pending Prescriptions Disp Refills    FLUoxetine (PROzac) 20 MG capsule [Pharmacy Med Name: fluoxetine 20 mg capsule] 90 capsule 1     Sig: TAKE THREE CAPSULES BY MOUTH EVERY DAY      Last office visit with prescribing clinician: 4/25/2023   Last telemedicine visit with prescribing clinician: Visit date not found   Next office visit with prescribing clinician: 10/31/2023                         Would you like a call back once the refill request has been completed: [] Yes [] No    If the office needs to give you a call back, can they leave a voicemail: [] Yes [] No    Laurie Hernandez LPN  08/16/23, 10:47 EDT

## 2023-08-28 RX ORDER — AMLODIPINE BESYLATE 5 MG/1
TABLET ORAL
Qty: 30 TABLET | Refills: 3 | Status: SHIPPED | OUTPATIENT
Start: 2023-08-28

## 2023-09-12 DIAGNOSIS — F32.1 CURRENT MODERATE EPISODE OF MAJOR DEPRESSIVE DISORDER, UNSPECIFIED WHETHER RECURRENT: Chronic | ICD-10-CM

## 2023-09-12 RX ORDER — BUPROPION HYDROCHLORIDE 300 MG/1
TABLET ORAL
Qty: 90 TABLET | Refills: 1 | OUTPATIENT
Start: 2023-09-12

## 2023-09-27 ENCOUNTER — OFFICE VISIT (OUTPATIENT)
Dept: PAIN MEDICINE | Facility: CLINIC | Age: 63
End: 2023-09-27
Payer: MEDICAID

## 2023-09-27 VITALS — WEIGHT: 111.2 LBS | BODY MASS INDEX: 22.42 KG/M2 | HEIGHT: 59 IN

## 2023-09-27 DIAGNOSIS — F41.9 ANXIETY AND DEPRESSION: ICD-10-CM

## 2023-09-27 DIAGNOSIS — M51.16 LUMBAR DISC HERNIATION WITH RADICULOPATHY: ICD-10-CM

## 2023-09-27 DIAGNOSIS — M47.816 SPONDYLOSIS OF LUMBAR REGION WITHOUT MYELOPATHY OR RADICULOPATHY: ICD-10-CM

## 2023-09-27 DIAGNOSIS — F32.A ANXIETY AND DEPRESSION: ICD-10-CM

## 2023-09-27 DIAGNOSIS — G47.09 OTHER INSOMNIA: ICD-10-CM

## 2023-09-27 DIAGNOSIS — M48.062 LUMBAR STENOSIS WITH NEUROGENIC CLAUDICATION: ICD-10-CM

## 2023-09-27 DIAGNOSIS — M81.0 OSTEOPOROSIS, UNSPECIFIED OSTEOPOROSIS TYPE, UNSPECIFIED PATHOLOGICAL FRACTURE PRESENCE: ICD-10-CM

## 2023-09-27 PROCEDURE — 1159F MED LIST DOCD IN RCRD: CPT | Performed by: NURSE PRACTITIONER

## 2023-09-27 PROCEDURE — 1160F RVW MEDS BY RX/DR IN RCRD: CPT | Performed by: NURSE PRACTITIONER

## 2023-09-27 PROCEDURE — 1125F AMNT PAIN NOTED PAIN PRSNT: CPT | Performed by: NURSE PRACTITIONER

## 2023-09-27 PROCEDURE — 99214 OFFICE O/P EST MOD 30 MIN: CPT | Performed by: NURSE PRACTITIONER

## 2023-09-27 NOTE — PROGRESS NOTES
"Chief Complaint: \"Lower back pain.\"       History of Present Illness:   Patient: Ms. Areli Barnett, 63 y.o. female originally referred by Salvatore Townsend MD, in consultation for chronic intractable low back pain.  She reports a longstanding history of progressive lower back and right lower extremity pain.  We last saw her on January 24, 2022 when she underwent a diagnostic and therapeutic right L4-L5 and right L5-S1 transforaminal epidural steroid injections, from which she tells me today she is no longer experiencing any symptoms radiating into her right leg.  Unfortunately, she did not follow-up thereafter for reassessment and has not been seen since.  Most recently, she has been referred by Christy Damon PA-C for reconsultation of her lower back and lower extremity pain.  A new MRI of the lumbar spine was obtained on June 29, 2023, which re-demonstrated diffuse degenerative disc disease with facet hypertrophy most significant at L4-L5 and L5-S1.  At L4-L5 there is bilateral facet hypertrophy with a right paracentral disc protrusion contributing to right-sided canal stenosis, right lateral recess stenosis, severe right neuroforaminal stenosis.  There are moderate endplate changes.  Patient has failed to obtain pain relief with conservative measures including oral analgesics, physical therapy, chiropractic therapy 2-4 x per week (Robert Swartz), water aerobics (current), stretch class, ongoing physical therapy exercises, to name a few.   Pain Description: Constant lower back pain with intermittent exacerbation, described as aching, burning and throbbing sensation.   Radiation of Pain: The pain radiates from the lumbar region into the hips, she no longer experiences pain down into the posterior aspect of the right thigh and right calf   Pain intensity today: 2/10  Average pain intensity last week: 3/10  Pain intensity ranges from: 2/10 to 3/10  Aggravating factors: Pain increases with lifting, bending, twisting, " "standing, household chores, protracted sitting.   Alleviating factors: Pain decreases with sitting down, lying down, at times walking   Associated Symptoms:   Patient denies pain, numbness, or weakness in the lower extremities  Patient denies any new bladder or bowel problems.   Patient denies difficulties with her balance or recent falls.   Pain interferes with regular activities, ADLs, and affects patient's quality of life  Pain interferes with general activities (ability to walk, stand, transition from different positions), perform activities of daily living  Pain interferes with sleep causing sleep fragmentation   Muscle spasms  Stiffness  Fatigue    Review of previous therapies and additional medical records:  Areli Barnett has already failed the following measures, including:   Conservative Measures: Oral analgesics, ice, heat, TENs, chiropractic therapy, physical therapy   Interventional Measures: 01/24/2022: DxTx right L4-L5 and right L5-S1 transforaminal epidural steroid injection  Surgical Measures: No history of previous lumbar spine or hip surgery   Areli Barnett underwent neurosurgical consultation with Christy Damon PA-C on 7/6/2023, it was found not to be a surgical candidate.  She did undergo psychological evaluation with Juany Tanner on 1/25/2022, recommendations are \" patient is currently using marijuana daily, has been taking Ritalin not prescribed her several times per month, and drinking alcohol several times per week.  She notes using substances to moderate her anxiety and pain.  She has a history of alcoholism and DUI and attendance of AA meetings.  From a psychological perspective, patient appears to be able to tolerate interventional pain procedures at this time.  It is recommended patient continue to receive outside psychiatric treatment and individual psychotherapy for depression anxiety.  It is recommended the patient be referred for formal substance abuse treatment.\"  Areli WILLARD " Anibal presents with significant comorbidities including hyperlipidemia, hypertension, GERD, esophageal stricture, osteoporosis, osteoarthritis, anxiety, depression, alcohol dependence, episodic drinking behavior  In terms of current analgesics, Areli Barnett takes: Acetaminophen, cyclobenzaprine, meloxicam. Patient also takes Fosamax, bupropion, Prozac, Zofran, trazodone  I have reviewed Vicente Report is consistent with medication reconciliation.  SOAPP: High Risk     Global Pain Scale 01-04 2022 09-27 2023         Pain 11 7         Feelings 20 1         Clinical outcomes 16 4         Activities 20 3         GPS Total: 67 15           The [place] Back Pain Disability Scale  DATE 09-27 2023                 Sleep through the night 2                 Turn over in bed 0                 Get out of bed 0                 Make your bed 0                 Put on socks (pantyhose) 0                 Ride in a car 0                 Sit in a chair for several hours 2                 Stand up for 20-30 minutes 2                 Climb one flight of stairs 0                 Walk a few blocks (200-300 yards)  0                 Walk several miles 0                 Run one block (about 50 yards) 5                 Take food out of the refrigerator 1                 Reach up to high shelves 0                 Move a chair 0                 Pull or push heavy doors 1                 Bend over to clean the bathtub 2                 Throw a ball 1                 Carry two bags of groceries 2                 Lift and carry a heavy suitcase 0                 Total score 18                       Review of New Diagnostic Studies:  MRI of the lumbar spine was obtained on June 29, 2023, which re-demonstrated diffuse degenerative disc disease with facet hypertrophy most significant at L4-L5 and L5-S1.  At L4-L5 there is bilateral facet hypertrophy with a right paracentral disc protrusion contributing to right-sided canal stenosis, right  lateral recess stenosis, severe right neuroforaminal stenosis.  There are moderate endplate changes.  Lumbar spine x-rays with flexion-extension views 7/6/2023: Minimal fixed anterior listhesis L4-L5, no evidence of instability.  Multilevel spondylosis most significant at L3-L4 and L4-L5.  EMG/NCV of the bilateral lower extremities 2/23/2022: Bilateral S1 radiculopathies    Review of Diagnostic Studies:   MRI of the lumbar spine without contrast 11/18/2021:  Vertebral body heights and alignment are preserved.  The conus medullaris is seen at T12-L1 and has an unremarkable appearance.    T12-L1, L1-L2, L2-L3, L3-L4: Disc bulges.  No significant canal or foraminal stenosis  L4-L5: Modic endplate changes with severe degenerative disc disease.  Disc osteophyte complex, facet hypertrophy contributing to mild central canal stenosis, right lateral recess stenosis, and severe right and mild left neuroforaminal stenosis  L5-S1: No significant canal or foraminal stenosis  X-rays of the lumbar spine on 5/21/2021.  Vertebral body heights and alignment are preserved.  There is loss of disc space height and facet arthropathy at L4-L5 and L5-S1.  Hip x-rays on 5/12/2021: Joint spaces are preserved.  No evidence of fracture or dislocation.    Review of Systems   Constitutional:  Positive for appetite change, fatigue and unexpected weight change.   HENT:  Positive for hearing loss and tinnitus.    Eyes:  Positive for visual disturbance.   Respiratory:  Positive for shortness of breath.    Gastrointestinal:  Positive for abdominal distention, constipation and nausea.   Endocrine: Positive for polyphagia.   Genitourinary:  Positive for difficulty urinating, dyspareunia and flank pain.   Musculoskeletal:  Positive for arthralgias, back pain and myalgias.   Psychiatric/Behavioral:  Positive for decreased concentration.    All other systems reviewed and are negative.      Patient Active Problem List   Diagnosis    Allergic rhinitis     "Generalized anxiety disorder    Mixed hyperlipidemia    Osteoporosis    Gastroesophageal reflux disease with esophagitis    Other insomnia    Recurrent major depressive disorder, in full remission    Dysphagia    Arthritis    Chronic superficial gastritis without bleeding    Ganglion cyst of wrist, left    Esophageal stricture    Gastric ulcer without hemorrhage or perforation    Inlet patch of esophagus    Helicobacter pylori infection    Paresthesia of both hands    Ulnar neuropathy at elbow    Right hip pain    Pain in joint involving multiple sites    Palpitations    Essential hypertension    Family history of breast cancer    Alcohol dependence, episodic drinking behavior    Marijuana smoker, continuous    Suicidal ideations    Lumbar disc herniation with radiculopathy    Lumbar stenosis with neurogenic claudication    Lumbar facet arthropathy    Low back pain       Past Medical History:   Diagnosis Date    Anemia     Arthritis     Body piercing     EARS    Depression     Dysphagia     REPORTS SOMETIMES SHE HAS PAIN WHEN SWALLOWING FOOD    Elevated cholesterol     Elevated LFTs     Endometriosis     Epigastric pain     Exposure to TB     REPORTS MANY YEARS AGO. REPORTS TB TESTING HAS ALWAYS BEEN NEGATIVE.     Family history of breast cancer 10/06/2021    9/2021: Genetic testing neg for pathogenic mutations in BRCA1/2 and 34 additional genes included on the [employer] panel. Lifetime breast cancer risk is estimated to be up to 8.5%     GERD (gastroesophageal reflux disease)     History of bronchitis 01/2019    History of chest pain     REPORTS FALL 2018 AND THAT SHE HAD TESTING THAT WAS WNL'S. REPORTS SHE IS NOW BEING CHECKED FOR GI.     History of exercise stress test     2018 - REPORTS WAS TOLD ALL WNL'S     History of fracture     TOE    Hyperlipidemia     Latex allergy     Low back pain     Murmur     REPORTS \"A SLIGHT MURMUR\"    Osteoporosis     Plantar fasciitis     Seasonal allergies     Vertigo     Wears " glasses     PRN         Past Surgical History:   Procedure Laterality Date    ABDOMINOPLASTY  2003    AUGMENTATION MAMMAPLASTY Bilateral     COLONOSCOPY  2016    ENDOSCOPY N/A 2019    Procedure: ESOPHAGOGASTRODUODENOSCOPY with cold biopsy and esophageal dilation;  Surgeon: Brenden Steward MD;  Location: [facility] ENDOSCOPY;  Service: Gastroenterology    ENDOSCOPY N/A 2019    Procedure: ESOPHAGOGASTRODUODENOSCOPY W/ COLD FORCEP BIOPSIES;  Surgeon: Brenden Steward MD;  Location: [facility] ENDOSCOPY;  Service: Gastroenterology    EPIDURAL      UPPER GASTROINTESTINAL ENDOSCOPY  2019    WISDOM TOOTH EXTRACTION           Family History   Problem Relation Age of Onset    Breast cancer Maternal Aunt     Lung cancer Mother     Cancer Mother     Heart attack Father     Crohn's disease Sister     Breast cancer Sister     Cancer Sister     Stomach cancer Paternal Grandfather     Alcohol abuse Paternal Grandfather     Colon cancer Neg Hx     Cirrhosis Neg Hx     Liver disease Neg Hx     Liver cancer Neg Hx     Ulcerative colitis Neg Hx     Esophageal cancer Neg Hx          Social History     Socioeconomic History    Marital status: Single   Tobacco Use    Smoking status: Former     Packs/day: 0.25     Years: 5.00     Pack years: 1.25     Types: Cigarettes     Quit date: 2019     Years since quittin.6    Smokeless tobacco: Never   Vaping Use    Vaping Use: Every day    Substances: THC, CBD   Substance and Sexual Activity    Alcohol use: Yes     Comment: once monthly but drinks a lot when does    Drug use: Yes     Types: Marijuana     Comment: daily    Sexual activity: Defer     Partners: Male          Current Outpatient Medications:     alendronate (FOSAMAX) 70 MG tablet, TAKE ONE TABLET BY MOUTH EVERY 7 DAYS, Disp: 12 tablet, Rfl: 3    amLODIPine (NORVASC) 5 MG tablet, TAKE ONE TABLET BY MOUTH EVERY DAY, Disp: 30 tablet, Rfl: 3    atorvastatin (LIPITOR) 20 MG tablet, TAKE ONE TABLET BY MOUTH AT BEDTIME,  Disp: 30 tablet, Rfl: 11    buPROPion XL (WELLBUTRIN XL) 300 MG 24 hr tablet, Take 1 tablet by mouth Daily., Disp: 90 tablet, Rfl: 3    cetirizine (zyrTEC) 10 MG tablet, TAKE ONE TABLET BY MOUTH AT BEDTIME, Disp: 30 tablet, Rfl: 5    clotrimazole-betamethasone (LOTRISONE) 1-0.05 % cream, Apply  topically to the appropriate area as directed 2 (Two) Times a Day. (Patient taking differently: Apply  topically to the appropriate area as directed 2 (Two) Times a Day. PRN), Disp: 15 g, Rfl: 0    conjugated estrogens (Premarin) 0.625 MG/GM vaginal cream, Use 0.5 grams intravaginally twice weekly to control symptoms. (Patient taking differently: Use 0.5 grams intravaginally twice weekly to control symptoms.    PRN), Disp: 1 each, Rfl: 11    cyclobenzaprine (FLEXERIL) 10 MG tablet, TAKE ONE TABLET BY MOUTH THREE TIMES DAILY AS NEEDED FOR muscle SPASMS, Disp: 30 tablet, Rfl: 2    dicyclomine (BENTYL) 20 MG tablet, Take 1 tablet by mouth Every 6 (Six) Hours. (Patient taking differently: Take 1 tablet by mouth Every 6 (Six) Hours. PRN), Disp: 60 tablet, Rfl: 0    famotidine (PEPCID) 40 MG tablet, TAKE ONE TABLET BY MOUTH EVERY DAY AS NEEDED FOR INDIGESTION OR HEARTBURN, Disp: 30 tablet, Rfl: 2    FLUoxetine (PROzac) 20 MG capsule, TAKE THREE CAPSULES BY MOUTH EVERY DAY, Disp: 90 capsule, Rfl: 1    FLUoxetine (PROzac) 40 MG capsule, Take 1 capsule by mouth Daily., Disp: 90 capsule, Rfl: 3    fluticasone (FLONASE) 50 MCG/ACT nasal spray, INHALE 1 SPRAY IN EACH NOSTRIL TWICE DAILY shake WELL BEFORE using, Disp: 48 g, Rfl: 3    meloxicam (MOBIC) 15 MG tablet, TAKE ONE TABLET BY MOUTH EVERY DAY WITH food, Disp: 90 tablet, Rfl: 1    olopatadine (PATANOL) 0.1 % ophthalmic solution, Instill ONE drop IN EACH EYE TWICE DAILY, Disp: 5 mL, Rfl: 5    ondansetron ODT (ZOFRAN-ODT) 4 MG disintegrating tablet, Place 1 tablet on the tongue Every 8 (Eight) Hours As Needed for Nausea or Vomiting., Disp: 30 tablet, Rfl: 1    traZODone (DESYREL) 50  "MG tablet, TAKE ONE TABLET BY MOUTH AT BEDTIME AS NEEDED FOR SLEEP, Disp: 90 tablet, Rfl: 3    tretinoin (Retin-A) 0.05 % cream, Apply  topically to the appropriate area as directed At Night As Needed (acne)., Disp: 20 g, Rfl: 2      Allergies   Allergen Reactions    Latex Rash    Penicillins Rash         Ht 149.9 cm (59\")   Wt 50.4 kg (111 lb 3.2 oz)   LMP  (LMP Unknown)   BMI 22.46 kg/m²       Physical Exam:  Constitutional: Patient appears well-developed, well-nourished, well-hydrated  HEENT: Head: Normocephalic and atraumatic  Eyes: Conjunctivae and lids are normal  Pupils: Equal, round, reactive to light  Peripheral vascular exam: Posterior tibialis: right 2+ and left 2+. Dorsalis pedis: right 2+ and left 2+. No edema.   Musculoskeletal   Gait and station: Gait evaluation demonstrated a normal gait  Lumbar spine: Passive and active range of motion are limited secondary to pain. Extension, flexion, and rotation of the lumbar spine increased and reproduce pain. Lumbar facet joint loading maneuvers are positive.   Kong test and Gaenslen's test are negative   Piriformis maneuvers are negative   Palpation of the bilateral greater trochanter, unrevealing   Examination of the Iliotibial band: unrevealing   Hip joints: The range of motion of the hip joints is full and without pain   Neurological:   Patient is alert and oriented to person, place, and time.   Speech: Normal.   Cortical function: Normal mental status.   Reflex Scores:  Right patellar: 0+  Left patellar: 0+  Right Achilles: 0+  Left Achilles: 0+  Motor strength: 5/5  Motor Tone: Normal  Involuntary movements: None.   Superficial/Primitive Reflexes: Primitive reflexes were absent.   Right Parker: Absent  Left Parker: Absent  Right ankle clonus: Absent  Left ankle clonus: Absent   Babinsky: Absent  Long tract signs: Negative. Straight leg raising test: Negative. Femoral stretch sign: Negative.   Sensory exam: Intact to light touch, intact pain and " temperature sensation, intact vibration sensation and normal proprioception.   Coordination: Normal finger to nose and heel to shin. Normal balance and negative Romberg's sign   Skin and subcutaneous tissue: Skin is warm and intact. No rash noted. No cyanosis.   Psychiatric: Judgment and insight: Normal. Recent and remote memory: Intact. Mood and affect: Normal.     ASSESSMENT:   1. Spondylosis of lumbar region without myelopathy or radiculopathy    2. Lumbar disc herniation with radiculopathy    3. Lumbar stenosis with neurogenic claudication    4. Osteoporosis, unspecified osteoporosis type, unspecified pathological fracture presence    5. Other insomnia    6. Anxiety and depression        PLAN/MEDICAL DECISION MAKING: Ms. Areli Barnett, 63 y.o. female reports a longstanding history of progressive lower back and right lower extremity pain, which began without incident. We last saw her on January 24, 2022 when she underwent a diagnostic and therapeutic right L4-L5 and right L5-S1 transforaminal epidural steroid injections, from which she tells me today she is no longer experiencing any symptoms radiating into her right leg.  Unfortunately, she did not follow-up thereafter for reassessment and has not been seen since.  Most recently, she has been referred by Christy Damon PA-C for reconsultation of her lower back and lower extremity pain.  A new MRI of the lumbar spine was obtained on June 29, 2023, which re-demonstrated diffuse degenerative disc disease with facet hypertrophy most significant at L4-L5 and L5-S1.  At L4-L5 there is bilateral facet hypertrophy with a right paracentral disc protrusion contributing to right-sided canal stenosis, right lateral recess stenosis, severe right neuroforaminal stenosis.  There are moderate endplate changes.  Patient has failed to obtain pain relief with conservative measures including oral analgesics, physical therapy, chiropractic therapy 2-4 x per week (Robert Swartz), water  aerobics (current), stretch class, ongoing physical therapy exercises, to name a few.  Today, she has not had right lower extremity symptoms in quite some time.  Her symptoms are mainly mechanical. Patient has failed to obtain pain relief with conservative measures, as referenced above. I have reviewed all available patient's medical records as well as previous therapies as referenced above. I had a lengthy conversation with Ms. Areli Barnett regarding her chronic pain condition and potential therapeutic options including risks, benefits, alternative therapies, to name a few. Therefore, I have proposed the following plan:  1. Diagnostic studies:  A. If she has recurrence of her right lower extremity symptoms we may consider EMG/NCV of the bilateral lower extremities   2. Pharmacological measures: Reviewed and discussed; Patient takes acetaminophen, cyclobenzaprine, meloxicam. Patient also takes Fosamax, bupropion, Prozac, Zofran, trazodone. Patient has declined additional pharmacological measures   3. Interventional pain management measures: Patient will be scheduled for Patient will be scheduled for diagnostic bilateral lumbar medial branch blocks at L3, L4, L5; for bilateral lumbar facet joints at L4-L5, L5-S1 to clarify the origin of chronic refractory mechanical lower back pain. If patient experiences more than 80% relief along with significant improvement the range of motion of the lumbar spine, then, patient will be scheduled for a second set of diagnostic bilateral lumbar medial branch blocks, to then, proceed with bilateral lumbar medial branch rhizotomies.  If her right lower extremity symptoms recur we may repeat right L4-L5 and right L5-S1 transforaminal epidural steroid injections. We may repeat epidurals depending on patient's outcome.  If she continues to struggle with pain, she will follow-up with Christy Damon PA-C.  4. Long-term rehabilitation efforts:  A. The patient does not have a history of  falls. I did complete a risk assessment for falls  B. Patient will start a comprehensive physical therapy program  for core strengthening, gait and balance training, neurodynamics and Alter-G    C.  Continue water aerobics and stretch class  D. Continue using TENS unit  E. Continue contrast therapy: Apply ice-packs for 15-20 minutes, followed by heating pads for 15-20 minutes to affected area   F.  Continue treatment with behavioral health.  5. The patient has been instructed to contact my office with any questions or difficulties. The patient understands the plan and agrees to proceed accordingly.    Areli Barnett reports a pain score of 2.  Given her pain assessment as noted, treatment options were discussed and the following options were decided upon as a follow-up plan to address the patient's pain: continuation of current treatment plan for pain, home exercises and therapy, patient not interested in pharmacological measures, and use of non-medical modalities (ice, heat, stretching and/or behavior modifications).      Pain Medications               buPROPion XL (WELLBUTRIN XL) 300 MG 24 hr tablet Take 1 tablet by mouth Daily.    cyclobenzaprine (FLEXERIL) 10 MG tablet TAKE ONE TABLET BY MOUTH THREE TIMES DAILY AS NEEDED FOR muscle SPASMS    FLUoxetine (PROzac) 20 MG capsule TAKE THREE CAPSULES BY MOUTH EVERY DAY    FLUoxetine (PROzac) 40 MG capsule Take 1 capsule by mouth Daily.    meloxicam (MOBIC) 15 MG tablet TAKE ONE TABLET BY MOUTH EVERY DAY WITH food    traZODone (DESYREL) 50 MG tablet TAKE ONE TABLET BY MOUTH AT BEDTIME AS NEEDED FOR SLEEP                Patient Care Team:  Salvatore Townsend MD as PCP - General (Family Medicine)  Salvatore Townsend MD as PCP - Family Medicine (Family Medicine)  Blanca Damon PA-C as Consulting Physician (Physician Assistant)  Kendra Elder APRN as Nurse Practitioner (Nurse Practitioner)     No orders of the defined types were placed in this encounter.         Future Appointments   Date Time Provider Department Center   10/17/2023  3:30 PM Ricardo Matt APRN [facility]   10/31/2023  1:15 PM Salvatore Townsend MD [facility]         J[facility] Horne     Please note that portions of this note were completed with a voice recognition program.

## 2023-09-28 DIAGNOSIS — M47.816 SPONDYLOSIS OF LUMBAR REGION WITHOUT MYELOPATHY OR RADICULOPATHY: Primary | ICD-10-CM

## 2023-10-07 DIAGNOSIS — F32.1 CURRENT MODERATE EPISODE OF MAJOR DEPRESSIVE DISORDER, UNSPECIFIED WHETHER RECURRENT: Chronic | ICD-10-CM

## 2023-10-07 DIAGNOSIS — F41.1 GENERALIZED ANXIETY DISORDER: Chronic | ICD-10-CM

## 2023-10-09 RX ORDER — FLUOXETINE HYDROCHLORIDE 20 MG/1
CAPSULE ORAL
Qty: 90 CAPSULE | Refills: 1 | Status: SHIPPED | OUTPATIENT
Start: 2023-10-09

## 2023-10-09 NOTE — TELEPHONE ENCOUNTER
Rx Refill Note  Requested Prescriptions     Pending Prescriptions Disp Refills    FLUoxetine (PROzac) 20 MG capsule [Pharmacy Med Name: fluoxetine 20 mg capsule] 90 capsule 1     Sig: TAKE THREE CAPSULES BY MOUTH EVERY DAY      Last office visit with prescribing clinician: 4/25/2023   Last telemedicine visit with prescribing clinician: Visit date not found   Next office visit with prescribing clinician: 10/31/2023                         Would you like a call back once the refill request has been completed: [] Yes [] No    If the office needs to give you a call back, can they leave a voicemail: [] Yes [] No    Laurie Hernandez LPN  10/09/23, 09:30 EDT

## 2023-10-16 ENCOUNTER — OUTSIDE FACILITY SERVICE (OUTPATIENT)
Dept: PAIN MEDICINE | Facility: CLINIC | Age: 63
End: 2023-10-16
Payer: MEDICAID

## 2023-10-16 PROCEDURE — 99152 MOD SED SAME PHYS/QHP 5/>YRS: CPT | Performed by: ANESTHESIOLOGY

## 2023-10-16 PROCEDURE — 64493 INJ PARAVERT F JNT L/S 1 LEV: CPT | Performed by: ANESTHESIOLOGY

## 2023-10-16 PROCEDURE — 64494 INJ PARAVERT F JNT L/S 2 LEV: CPT | Performed by: ANESTHESIOLOGY

## 2023-10-17 ENCOUNTER — TELEMEDICINE (OUTPATIENT)
Dept: PSYCHIATRY | Facility: CLINIC | Age: 63
End: 2023-10-17
Payer: MEDICAID

## 2023-10-17 DIAGNOSIS — F41.1 GENERALIZED ANXIETY DISORDER: Chronic | ICD-10-CM

## 2023-10-17 DIAGNOSIS — G47.09 OTHER INSOMNIA: Chronic | ICD-10-CM

## 2023-10-17 DIAGNOSIS — F32.1 CURRENT MODERATE EPISODE OF MAJOR DEPRESSIVE DISORDER, UNSPECIFIED WHETHER RECURRENT: Primary | Chronic | ICD-10-CM

## 2023-10-17 PROCEDURE — 1160F RVW MEDS BY RX/DR IN RCRD: CPT | Performed by: NURSE PRACTITIONER

## 2023-10-17 PROCEDURE — 99214 OFFICE O/P EST MOD 30 MIN: CPT | Performed by: NURSE PRACTITIONER

## 2023-10-17 PROCEDURE — 1159F MED LIST DOCD IN RCRD: CPT | Performed by: NURSE PRACTITIONER

## 2023-10-17 NOTE — PROGRESS NOTES
"This provider is located at [facility]al [facility] ,[address], [address] in [place], [place],using a secure MyChart Video Visit through [facility]. Patient is being seen remotely via telehealth at their home address in [place], and stated they are in a secure environment for this session. The patient's condition being diagnosed/treated is appropriate for telemedicine. The provider identified herself as well as her credentials.   The patient, and/or patients guardian, consent to be seen remotely, and when consent is given they understand that the consent allows for patient identifiable information to be sent to a third party as needed.   They may refuse to be seen remotely at any time. The electronic data is encrypted and password protected, and the patient and/or guardian has been advised of the potential risks to privacy not withstanding such measures.    Chief Complaint  Anxiety, Depression, and Sleeping Problem      Subjective          Areli Barnett presents today via MyChart Video through [facility] for medication management of her anxiety, depression, sleeping difficulties.    History of Present Illness: Patient presents today for follow-up appointment after last been seen on 04/13/2023.  Patient says \"I am doing just fine\".  She says she has not been doing much, but is trying to stay busy.  She continues to go to the gym 3 days a week.  She says aside from that she is spending most of her time at home.  She did recently start taking a vegan cooking class on Sundays and says she is enjoying that.  Patient says she does not strictly follow a vegan diet because \"I like eggs too much\" but thinks the class has been fun.  She says she is still taking her medications on a consistent basis and says \"I take them every day, and they are still helping\".  She says the only thing she does not take every day is trazodone because she generally sleeps well without it.  She does like having it in " case she needs it because it is beneficial.  She says her appetite has been good, and she has been eating more recently.  She has no concerns there.  Patient denies any SI/HI, A/V hallucinations.      The following portions of the patient's history were reviewed and updated as appropriate: allergies, current medications, past family history, past medical history, past social history, past surgical history and problem list.      Current Medications:   Current Outpatient Medications   Medication Sig Dispense Refill    alendronate (FOSAMAX) 70 MG tablet TAKE ONE TABLET BY MOUTH EVERY 7 DAYS 12 tablet 3    amLODIPine (NORVASC) 5 MG tablet TAKE ONE TABLET BY MOUTH EVERY DAY 30 tablet 3    atorvastatin (LIPITOR) 20 MG tablet TAKE ONE TABLET BY MOUTH AT BEDTIME 30 tablet 11    buPROPion XL (WELLBUTRIN XL) 300 MG 24 hr tablet Take 1 tablet by mouth Daily. 90 tablet 3    clotrimazole-betamethasone (LOTRISONE) 1-0.05 % cream Apply  topically to the appropriate area as directed 2 (Two) Times a Day. (Patient taking differently: Apply  topically to the appropriate area as directed 2 (Two) Times a Day. PRN) 15 g 0    conjugated estrogens (Premarin) 0.625 MG/GM vaginal cream Use 0.5 grams intravaginally twice weekly to control symptoms. (Patient taking differently: Use 0.5 grams intravaginally twice weekly to control symptoms.    PRN) 1 each 11    cyclobenzaprine (FLEXERIL) 10 MG tablet TAKE ONE TABLET BY MOUTH THREE TIMES DAILY AS NEEDED FOR muscle SPASMS 30 tablet 2    dicyclomine (BENTYL) 20 MG tablet Take 1 tablet by mouth Every 6 (Six) Hours. (Patient taking differently: Take 1 tablet by mouth Every 6 (Six) Hours. PRN) 60 tablet 0    famotidine (PEPCID) 40 MG tablet TAKE ONE TABLET BY MOUTH EVERY DAY AS NEEDED FOR INDIGESTION OR HEARTBURN 30 tablet 2    FLUoxetine (PROzac) 20 MG capsule TAKE THREE CAPSULES BY MOUTH EVERY DAY 90 capsule 1    fluticasone (FLONASE) 50 MCG/ACT nasal spray INHALE 1 SPRAY IN EACH NOSTRIL TWICE  DAILY shake WELL BEFORE using 48 g 3    meloxicam (MOBIC) 15 MG tablet TAKE ONE TABLET BY MOUTH EVERY DAY WITH food 90 tablet 1    olopatadine (PATANOL) 0.1 % ophthalmic solution Instill ONE drop IN EACH EYE TWICE DAILY 5 mL 5    ondansetron ODT (ZOFRAN-ODT) 4 MG disintegrating tablet Place 1 tablet on the tongue Every 8 (Eight) Hours As Needed for Nausea or Vomiting. 30 tablet 1    traZODone (DESYREL) 50 MG tablet TAKE ONE TABLET BY MOUTH AT BEDTIME AS NEEDED FOR SLEEP 90 tablet 3    tretinoin (Retin-A) 0.05 % cream Apply  topically to the appropriate area as directed At Night As Needed (acne). 20 g 2     No current facility-administered medications for this visit.       Mental Status Exam:   Hygiene:    MyChart video  Cooperation:  Cooperative  Eye Contact:  Good  Psychomotor Behavior:  Appropriate  Affect:  Appropriate  Mood: euthymic  Speech:  Normal  Thought Process:  Goal directed  Thought Content:  Mood congruent  Suicidal:  None  Homicidal:  None  Hallucinations:  None  Delusion:  None  Memory:  Intact  Orientation:  Person, Place, Time, and Situation  Reliability:  good  Insight:  Good  Judgement:  Good  Impulse Control:  Good  Physical/Medical Issues:   HLD, GERD, plantar fasciitis      Objective   Vital Signs:   There were no vitals taken for this visit.    Physical Exam  Neurological:      Mental Status: She is oriented to person, place, and time. Mental status is at baseline.   Psychiatric:         Behavior: Behavior is cooperative.        Result Review :     The following data was reviewed by: JAI Gillette on 10/17/2023:    Data reviewed : Previous notes, medication history           Assessment and Plan    Diagnoses and all orders for this visit:    1. Current moderate episode of major depressive disorder, unspecified whether recurrent (Primary)    2. Generalized anxiety disorder    3. Other insomnia           Tobacco Cessation:  Patient is a former tobacco user. No tobacco cessation education  necessary.      Impression/Plan:  -This is a follow-up appointment.  Patient presents today and reports she has been doing well overall since her last appointment.  She says she has been taking her medications daily as prescribed, and denies any adverse effects associated with them.  She feels her current regimen has continued to work very well for her anxiety and depression symptoms.  She says she generally sleeps well without needing trazodone, but does like having it in case she needs it.  She says she continues to be very pleased with her current medication regimen, and the progress she has made.  -Maintain Wellbutrin  mg daily.  -Maintain Prozac 60 mg daily.  -Maintain trazodone 50 mg nightly as needed.  -Patient's CONNOR report reviewed and deemed appropriate.  Patient counseled on use of controlled substances.  -Reviewed available lab work.  -Schedule follow-up appointment for 6 months or as needed.    MEDS ORDERED DURING VISIT:  No orders of the defined types were placed in this encounter.        Follow Up   Return in about 6 months (around 4/17/2024), or if symptoms worsen or fail to improve, for Next scheduled follow up, In-Person Appt.  Patient was given instructions and counseling regarding her condition or for health maintenance advice. Please see specific information pulled into the AVS if appropriate.       TREATMENT PLAN/GOALS: Continue supportive psychotherapy efforts and medications as indicated. Treatment and medication options discussed during today's visit. Patient acknowledged and verbally consented to continue with current treatment plan and was educated on the importance of compliance with treatment and follow-up appointments.    MEDICATION ISSUES:  Discussed medication options and treatment plan of prescribed medication as well as the risks, benefits, and side effects including potential falls, possible impaired driving and metabolic adversities among others. Patient is agreeable to  call the office with any worsening of symptoms or onset of side effects. Patient is agreeable to call 911 or go to the nearest ER should he/she begin having SI/HI.          This document has been electronically signed by JAI Scott, [email or web address]  October 17, 2023 15:56 EDT      Part of this note may be an electronic transcription/translation of spoken language to printed text using the Dragon Dictation System.

## 2023-10-23 DIAGNOSIS — M47.816 SPONDYLOSIS OF LUMBAR REGION WITHOUT MYELOPATHY OR RADICULOPATHY: Primary | ICD-10-CM

## 2023-10-24 ENCOUNTER — TELEPHONE (OUTPATIENT)
Dept: PAIN MEDICINE | Facility: CLINIC | Age: 63
End: 2023-10-24
Payer: MEDICAID

## 2023-10-24 DIAGNOSIS — M47.816 SPONDYLOSIS OF LUMBAR REGION WITHOUT MYELOPATHY OR RADICULOPATHY: Primary | ICD-10-CM

## 2023-10-24 NOTE — TELEPHONE ENCOUNTER
FOLLOW-UP CALL AFTER PROCEDURE  I spoke with Areli Barnett regarding how they're feeling after yesterday's procedure with Dr. Biggs. Patient reports that she is doing well.   Pt underwent a diagnostic procedure (see procedure note for details) on 10/16/2023 . Patient reported 90% pain relief at the time of after procedure exam with Dr. Biggs. In addition, patient experienced significant functional improvement (performing activities without experiencing pain in comparison with examination prior to the procedure that reproduced pain with those activities).   Pain level before procedure: 10/10   Pain level after procedure: 0/10  Patient reports that the pain relief lasted for approximately  hours. Today, Areli Barnett reports 40% ongoing pain relief pain (pain level 6-7/10) OR  pain has returned to baseline after approximately 8 hours   Patient denies side effects or complications  Patient does not have any questions or concerns at this time    Disposition:   I provided information regarding date and time of next procedure: 11/6/2023 with 1000 am arrival.   Reminded that the procedure is in the [address], 3rd floor.   I advised that patient must have a , and that the  must remain in the premises until after the procedure is completed and the patient is ready to be discharged.   Reminded NPO status: Nothing by mouth (eat or drink) for at least 8 hours prior to the procedure. Patient can take medications with a a sip of water.     Understanding of above information verbalized.

## 2023-11-02 DIAGNOSIS — M47.816 SPONDYLOSIS OF LUMBAR REGION WITHOUT MYELOPATHY OR RADICULOPATHY: Primary | ICD-10-CM

## 2023-11-09 DIAGNOSIS — E78.2 MIXED HYPERLIPIDEMIA: ICD-10-CM

## 2023-11-09 RX ORDER — ATORVASTATIN CALCIUM 20 MG/1
TABLET, FILM COATED ORAL
Qty: 90 TABLET | Refills: 1 | Status: SHIPPED | OUTPATIENT
Start: 2023-11-09

## 2023-11-15 DIAGNOSIS — M47.816 LUMBAR SPONDYLOSIS: ICD-10-CM

## 2023-11-15 RX ORDER — MELOXICAM 15 MG/1
TABLET ORAL
Qty: 90 TABLET | Refills: 1 | OUTPATIENT
Start: 2023-11-15

## 2023-11-15 NOTE — TELEPHONE ENCOUNTER
Rx Refill Note  Requested Prescriptions     Pending Prescriptions Disp Refills    meloxicam (MOBIC) 15 MG tablet [Pharmacy Med Name: meloxicam 15 mg tablet] 90 tablet 1     Sig: TAKE ONE TABLET BY MOUTH EVERY DAY WITH FOOD      Last office visit with prescribing clinician: 4/25/2023   Last telemedicine visit with prescribing clinician: Visit date not found   Next office visit with prescribing clinician: Visit date not found                         Would you like a call back once the refill request has been completed: [] Yes [] No    If the office needs to give you a call back, can they leave a voicemail: [] Yes [] No    Kaitlynn Vazquez RN  11/15/23, 09:32 EST

## 2023-11-21 ENCOUNTER — LAB (OUTPATIENT)
Dept: LAB | Facility: HOSPITAL | Age: 63
End: 2023-11-21
Payer: MEDICAID

## 2023-11-21 ENCOUNTER — OFFICE VISIT (OUTPATIENT)
Dept: INTERNAL MEDICINE | Facility: CLINIC | Age: 63
End: 2023-11-21
Payer: MEDICAID

## 2023-11-21 VITALS
WEIGHT: 104.6 LBS | TEMPERATURE: 97.8 F | HEIGHT: 59 IN | HEART RATE: 60 BPM | SYSTOLIC BLOOD PRESSURE: 112 MMHG | DIASTOLIC BLOOD PRESSURE: 72 MMHG | BODY MASS INDEX: 21.09 KG/M2

## 2023-11-21 DIAGNOSIS — E78.2 MIXED HYPERLIPIDEMIA: Chronic | ICD-10-CM

## 2023-11-21 DIAGNOSIS — Z12.31 BREAST CANCER SCREENING BY MAMMOGRAM: ICD-10-CM

## 2023-11-21 DIAGNOSIS — Z13.21 ENCOUNTER FOR VITAMIN DEFICIENCY SCREENING: ICD-10-CM

## 2023-11-21 DIAGNOSIS — K21.00 GASTROESOPHAGEAL REFLUX DISEASE WITH ESOPHAGITIS WITHOUT HEMORRHAGE: Chronic | ICD-10-CM

## 2023-11-21 DIAGNOSIS — I10 ESSENTIAL HYPERTENSION: Chronic | ICD-10-CM

## 2023-11-21 DIAGNOSIS — M25.532 LEFT WRIST PAIN: Primary | ICD-10-CM

## 2023-11-21 DIAGNOSIS — F41.1 GENERALIZED ANXIETY DISORDER: Chronic | ICD-10-CM

## 2023-11-21 DIAGNOSIS — R11.0 NAUSEA: ICD-10-CM

## 2023-11-21 LAB
ALBUMIN SERPL-MCNC: 4.6 G/DL (ref 3.5–5.2)
ALBUMIN/GLOB SERPL: 2.3 G/DL
ALP SERPL-CCNC: 48 U/L (ref 39–117)
ALT SERPL W P-5'-P-CCNC: 37 U/L (ref 1–33)
ANION GAP SERPL CALCULATED.3IONS-SCNC: 9 MMOL/L (ref 5–15)
AST SERPL-CCNC: 35 U/L (ref 1–32)
BILIRUB SERPL-MCNC: 0.4 MG/DL (ref 0–1.2)
BUN SERPL-MCNC: 14 MG/DL (ref 8–23)
BUN/CREAT SERPL: 20.3 (ref 7–25)
CALCIUM SPEC-SCNC: 9.2 MG/DL (ref 8.6–10.5)
CHLORIDE SERPL-SCNC: 100 MMOL/L (ref 98–107)
CHOLEST SERPL-MCNC: 148 MG/DL (ref 0–200)
CO2 SERPL-SCNC: 25 MMOL/L (ref 22–29)
CREAT SERPL-MCNC: 0.69 MG/DL (ref 0.57–1)
EGFRCR SERPLBLD CKD-EPI 2021: 98.9 ML/MIN/1.73
GLOBULIN UR ELPH-MCNC: 2 GM/DL
GLUCOSE SERPL-MCNC: 87 MG/DL (ref 65–99)
HDLC SERPL-MCNC: 70 MG/DL (ref 40–60)
LDLC SERPL CALC-MCNC: 66 MG/DL (ref 0–100)
LDLC/HDLC SERPL: 0.94 {RATIO}
POTASSIUM SERPL-SCNC: 4.3 MMOL/L (ref 3.5–5.2)
PROT SERPL-MCNC: 6.6 G/DL (ref 6–8.5)
SODIUM SERPL-SCNC: 134 MMOL/L (ref 136–145)
TRIGL SERPL-MCNC: 60 MG/DL (ref 0–150)
VLDLC SERPL-MCNC: 12 MG/DL (ref 5–40)

## 2023-11-21 PROCEDURE — 82607 VITAMIN B-12: CPT

## 2023-11-21 PROCEDURE — 80061 LIPID PANEL: CPT

## 2023-11-21 PROCEDURE — 80053 COMPREHEN METABOLIC PANEL: CPT

## 2023-11-21 RX ORDER — FAMOTIDINE 40 MG/1
40 TABLET, FILM COATED ORAL DAILY PRN
Qty: 90 TABLET | Refills: 3 | Status: SHIPPED | OUTPATIENT
Start: 2023-11-21

## 2023-11-21 RX ORDER — FAMOTIDINE 40 MG/1
TABLET, FILM COATED ORAL
Qty: 30 TABLET | Refills: 2 | Status: CANCELLED | OUTPATIENT
Start: 2023-11-21

## 2023-11-21 RX ORDER — CETIRIZINE HYDROCHLORIDE 10 MG/1
10 TABLET ORAL
COMMUNITY
Start: 2023-10-26

## 2023-11-21 RX ORDER — ONDANSETRON 4 MG/1
4 TABLET, ORALLY DISINTEGRATING ORAL EVERY 8 HOURS PRN
Qty: 30 TABLET | Refills: 1 | Status: SHIPPED | OUTPATIENT
Start: 2023-11-21

## 2023-11-21 NOTE — PROGRESS NOTES
Chief Complaint  Areli Barnett is a 63 y.o. female presenting for Med Refill (fasting) and Left wrist pain.     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     History of Present Illness  Patient is here for follow-up, blood work and refills.    She tells me she has been having pain of her left wrist over the last couple of months, no injury.  The pain is on and off, but has been affecting her ability to work out.  She points to a specific location of the left wrist on the palmar and radial side, deeper to the tendons.  No injury.  She does exercise regularly at the gym and this affects her ability to do certain activities.  She has a history of ganglion cyst of the right wrist, but no similar mass or swelling noted on the left side.  She also has history of carpal tunnel syndrome, but no morning symptoms at this time.  No numbness or tingling of the hand or fingers.    Patient was tested for breast cancer BRCA in the past, which was negative.  She has breast implants and had her last mammogram in January 2022.  She would like to continue with breast cancer screening mammogram.    Otherwise she tells me her back is doing really well.  She is very physically active and the back no longer bothers her.  She was on Lyrica for a while, but did not like how it made her feel and she has not used it recently.    The following portions of the patient's history were reviewed and updated as appropriate: allergies, current medications, past family history, past medical history, past social history, past surgical history, and problem list.    Objective  /72 (BP Location: Left arm, Patient Position: Sitting, Cuff Size: Adult)   Pulse 60   Temp 97.8 °F (36.6 °C)  "(Temporal)   Ht 149.9 cm (59.02\")   Wt 47.4 kg (104 lb 9.6 oz)   LMP  (LMP Unknown)   BMI 21.12 kg/m²     Physical Exam  Vitals reviewed.   Constitutional:       Appearance: Normal appearance.   HENT:      Head: Normocephalic and atraumatic.      Nose: No congestion.   Eyes:      Extraocular Movements: Extraocular movements intact.      Conjunctiva/sclera: Conjunctivae normal.   Cardiovascular:      Rate and Rhythm: Normal rate and regular rhythm.      Heart sounds: Normal heart sounds. No murmur heard.  Pulmonary:      Effort: Pulmonary effort is normal.      Breath sounds: Normal breath sounds.   Abdominal:      General: There is no distension.      Palpations: Abdomen is soft. There is no mass.      Tenderness: There is no abdominal tenderness.   Musculoskeletal:        Hands:       Cervical back: Neck supple.      Right lower leg: No edema.      Left lower leg: No edema.   Skin:     General: Skin is warm and dry.   Neurological:      Mental Status: She is alert and oriented to person, place, and time. Mental status is at baseline.      Comments: Able to get onto and off the exam table without difficulty   Psychiatric:         Behavior: Behavior normal.         Thought Content: Thought content normal.         Assessment/Plan   1. Left wrist pain  Discussed option to monitor symptoms, but she would rather see a hand surgeon for evaluation at this time.  I am unsure exactly what is causing the pain.  It is still on and off, but it is affecting her function and exercise.  - Ambulatory Referral to Hand Surgery    2. Essential hypertension  BP Readings from Last 3 Encounters:   11/21/23 112/72   07/06/23 110/70   04/25/23 106/66   Blood pressure at goal and well-controlled.  Continue on amlodipine 5 mg  - Comprehensive Metabolic Panel; Future    3. Generalized anxiety disorder  Stable and doing well.  Continue on current medications.    4. Gastroesophageal reflux disease with esophagitis without hemorrhage  She " ran out of Pepcid a while ago.  She has been taking Tums that seems to give her good relief.  She can continue as needed use of famotidine  - famotidine (PEPCID) 40 MG tablet; Take 1 tablet by mouth Daily As Needed for Heartburn.  Dispense: 90 tablet; Refill: 3    5. Nausea  Rarely uses, requesting refill  - ondansetron ODT (ZOFRAN-ODT) 4 MG disintegrating tablet; Place 1 tablet on the tongue Every 8 (Eight) Hours As Needed for Nausea or Vomiting.  Dispense: 30 tablet; Refill: 1    6. Mixed hyperlipidemia  Patient is fasting for lipids today.  Continue on atorvastatin 20 mg for now  - Lipid Panel; Future    7. Encounter for vitamin deficiency screening  - Vitamin B12; Future    8. Breast cancer screening by mammogram  Patient is due for repeat mammogram.  - Mammo Screening Digital Tomosynthesis Bilateral With CAD; Future    Counseled on recommendations for RSV vaccine for everyone over age 60, also counseled on recommendations for tetanus/Tdap vaccine every 10 years, and she is due.  She can check with pharmacy if these vaccines are covered    BMI is within normal parameters. No other follow-up for BMI required.      Return in about 13 weeks (around 2/20/2024) for Annual physical.    Future Appointments         Provider Department Center    2/27/2024 10:30 AM Salvatore Townsend MD [facility] INTERNAL MEDICINE [facility]    4/17/2024 3:30 PM (Arrive by 3:00 PM) Ricardo Matt APRN [facility]IORAL [facility] [place]              Salvatore Townsend MD  Family Medicine  11/21/2023

## 2023-11-22 LAB — VIT B12 BLD-MCNC: 762 PG/ML (ref 211–946)

## 2023-12-08 DIAGNOSIS — F41.1 GENERALIZED ANXIETY DISORDER: Chronic | ICD-10-CM

## 2023-12-08 DIAGNOSIS — F32.1 CURRENT MODERATE EPISODE OF MAJOR DEPRESSIVE DISORDER, UNSPECIFIED WHETHER RECURRENT: Chronic | ICD-10-CM

## 2023-12-08 RX ORDER — FLUOXETINE HYDROCHLORIDE 20 MG/1
CAPSULE ORAL
Qty: 90 CAPSULE | Refills: 1 | Status: SHIPPED | OUTPATIENT
Start: 2023-12-08

## 2023-12-12 DIAGNOSIS — M81.0 OSTEOPOROSIS, UNSPECIFIED OSTEOPOROSIS TYPE, UNSPECIFIED PATHOLOGICAL FRACTURE PRESENCE: ICD-10-CM

## 2023-12-12 RX ORDER — ALENDRONATE SODIUM 70 MG/1
TABLET ORAL
Qty: 12 TABLET | Refills: 3 | Status: SHIPPED | OUTPATIENT
Start: 2023-12-12

## 2023-12-20 DIAGNOSIS — M47.816 LUMBAR SPONDYLOSIS: ICD-10-CM

## 2023-12-20 RX ORDER — MELOXICAM 15 MG/1
TABLET ORAL
Qty: 90 TABLET | Refills: 1 | Status: SHIPPED | OUTPATIENT
Start: 2023-12-20

## 2023-12-20 NOTE — TELEPHONE ENCOUNTER
Rx Refill Note  Requested Prescriptions     Pending Prescriptions Disp Refills    meloxicam (MOBIC) 15 MG tablet [Pharmacy Med Name: meloxicam 15 mg tablet] 90 tablet 1     Sig: TAKE ONE TABLET BY MOUTH EVERY DAY WITH FOOD      Last office visit with prescribing clinician: 11/21/2023   Last telemedicine visit with prescribing clinician: Visit date not found   Next office visit with prescribing clinician: 2/27/2024                         Would you like a call back once the refill request has been completed: [] Yes [] No    If the office needs to give you a call back, can they leave a voicemail: [] Yes [] No    Kaitlynn Vazquez RN  12/20/23, 10:06 EST

## 2023-12-22 RX ORDER — AMLODIPINE BESYLATE 5 MG/1
TABLET ORAL
Qty: 30 TABLET | Refills: 3 | Status: SHIPPED | OUTPATIENT
Start: 2023-12-22

## 2024-01-12 RX ORDER — CETIRIZINE HYDROCHLORIDE 10 MG/1
10 TABLET ORAL
Qty: 30 TABLET | Refills: 5 | Status: SHIPPED | OUTPATIENT
Start: 2024-01-12

## 2024-02-03 DIAGNOSIS — F32.1 CURRENT MODERATE EPISODE OF MAJOR DEPRESSIVE DISORDER, UNSPECIFIED WHETHER RECURRENT: Chronic | ICD-10-CM

## 2024-02-03 DIAGNOSIS — F41.1 GENERALIZED ANXIETY DISORDER: Chronic | ICD-10-CM

## 2024-02-05 RX ORDER — FLUOXETINE HYDROCHLORIDE 20 MG/1
CAPSULE ORAL
Qty: 90 CAPSULE | Refills: 1 | Status: SHIPPED | OUTPATIENT
Start: 2024-02-05

## 2024-02-27 ENCOUNTER — LAB (OUTPATIENT)
Dept: LAB | Facility: HOSPITAL | Age: 64
End: 2024-02-27
Payer: MEDICAID

## 2024-02-27 ENCOUNTER — OFFICE VISIT (OUTPATIENT)
Dept: INTERNAL MEDICINE | Facility: CLINIC | Age: 64
End: 2024-02-27
Payer: MEDICAID

## 2024-02-27 VITALS
HEART RATE: 68 BPM | HEIGHT: 58 IN | TEMPERATURE: 98.2 F | WEIGHT: 104.4 LBS | BODY MASS INDEX: 21.92 KG/M2 | DIASTOLIC BLOOD PRESSURE: 80 MMHG | SYSTOLIC BLOOD PRESSURE: 124 MMHG

## 2024-02-27 DIAGNOSIS — R21 SKIN RASH: ICD-10-CM

## 2024-02-27 DIAGNOSIS — F10.20 ALCOHOL DEPENDENCE, EPISODIC DRINKING BEHAVIOR: ICD-10-CM

## 2024-02-27 DIAGNOSIS — R74.8 ELEVATED LIVER ENZYMES: ICD-10-CM

## 2024-02-27 DIAGNOSIS — S29.9XXD INJURY OF CHEST WALL, SUBSEQUENT ENCOUNTER: ICD-10-CM

## 2024-02-27 DIAGNOSIS — Z00.00 WELL ADULT EXAM: Primary | ICD-10-CM

## 2024-02-27 LAB
ALBUMIN SERPL-MCNC: 4.5 G/DL (ref 3.5–5.2)
ALBUMIN/GLOB SERPL: 1.9 G/DL
ALP SERPL-CCNC: 60 U/L (ref 39–117)
ALT SERPL W P-5'-P-CCNC: 49 U/L (ref 1–33)
ANION GAP SERPL CALCULATED.3IONS-SCNC: 10 MMOL/L (ref 5–15)
AST SERPL-CCNC: 41 U/L (ref 1–32)
BILIRUB SERPL-MCNC: 0.2 MG/DL (ref 0–1.2)
BUN SERPL-MCNC: 14 MG/DL (ref 8–23)
BUN/CREAT SERPL: 25.5 (ref 7–25)
CALCIUM SPEC-SCNC: 9.1 MG/DL (ref 8.6–10.5)
CHLORIDE SERPL-SCNC: 99 MMOL/L (ref 98–107)
CO2 SERPL-SCNC: 26 MMOL/L (ref 22–29)
CREAT SERPL-MCNC: 0.55 MG/DL (ref 0.57–1)
EGFRCR SERPLBLD CKD-EPI 2021: 103.8 ML/MIN/1.73
GLOBULIN UR ELPH-MCNC: 2.4 GM/DL
GLUCOSE SERPL-MCNC: 86 MG/DL (ref 65–99)
POTASSIUM SERPL-SCNC: 4.1 MMOL/L (ref 3.5–5.2)
PROT SERPL-MCNC: 6.9 G/DL (ref 6–8.5)
SODIUM SERPL-SCNC: 135 MMOL/L (ref 136–145)

## 2024-02-27 PROCEDURE — 80053 COMPREHEN METABOLIC PANEL: CPT

## 2024-02-27 RX ORDER — TRETINOIN 0.5 MG/G
CREAM TOPICAL NIGHTLY PRN
Qty: 20 G | Refills: 2 | Status: SHIPPED | OUTPATIENT
Start: 2024-02-27

## 2024-02-27 NOTE — PROGRESS NOTES
"Chief Complaint  Areli Barnett is a 64 y.o. female presenting for Annual Exam and Chest Pain (Chest wall From a fall  right side ).     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      History of Present Illness  Patient is here for annual physical and evaluation of chest wall pain.    She fell playing pickle ball on Friday, 2/16/2024.  She went to the walk-in clinic and was evaluated, they prescribed her anti-inflammatory medication and muscle relaxer.  She is still sore on the right side of her chest, it hurts to take a deep breath or to cough.  Movement is also causing discomfort around the anterior chest wall, deeper to the breast.  No changes to her breast contour.  She has bilateral silicone implants.    She is otherwise doing well.  She continues to follow-up with psychiatry who prescribes her medications for mental health.    She was noted with mildly elevated liver enzymes on her last blood work.  She has a history of alcohol dependence, she did have alcohol on 1 occasion last week, but is otherwise in remission.    The following portions of the patient's history were reviewed and updated as appropriate: allergies, current medications, past family history, past medical history, past social history, past surgical history, and problem list.    Objective  /80 (BP Location: Left arm, Patient Position: Sitting, Cuff Size: Adult)   Pulse 68   Temp 98.2 °F (36.8 °C) (Temporal)   Ht 148 cm (58.27\")   Wt 47.4 kg (104 lb 6.4 oz)   LMP  (LMP Unknown)   BMI 21.62 kg/m²     Physical Exam  Vitals reviewed.   Constitutional:       Appearance: Normal appearance.   HENT:      Head: Normocephalic and atraumatic.      Nose: Nose normal. No congestion.      " Mouth/Throat:      Mouth: Mucous membranes are moist.   Eyes:      Extraocular Movements: Extraocular movements intact.      Conjunctiva/sclera: Conjunctivae normal.   Cardiovascular:      Rate and Rhythm: Normal rate and regular rhythm.      Heart sounds: Normal heart sounds. No murmur heard.  Pulmonary:      Effort: Pulmonary effort is normal.      Breath sounds: Normal breath sounds.   Abdominal:      General: There is no distension.      Palpations: Abdomen is soft. There is no mass.      Tenderness: There is no abdominal tenderness.   Musculoskeletal:         General: Tenderness present.      Cervical back: Neck supple.      Right lower leg: No edema.      Left lower leg: No edema.      Comments: Palpation of the right axillary surface chest wall and sternum does not reproduce any pain.  No pain with compression of the thorax.  Endorses mild pain with deep inspiration and movement localized to the anterior chest wall.   Skin:     General: Skin is warm and dry.   Neurological:      Mental Status: She is alert and oriented to person, place, and time. Mental status is at baseline.   Psychiatric:         Behavior: Behavior normal.         Thought Content: Thought content normal.         Assessment/Plan   1. Well adult exam  Patient has no history of colon polyps.  Counseled on recommendations for colonoscopy every 10 years, she will be due in 2029.  Counseled on recommendations for annual breast cancer screening mammograms, she has appointment scheduled on 3/11/2024.  Counseled on recommendations for regular Pap smears, recommend she gets in touch with her OB/GYN for scheduling.  Patient is up-to-date on vaccines.  BMI is within normal parameters. No other follow-up for BMI required.      2. Injury of chest wall, subsequent encounter  Cannot rule out cracked rib.  Discussed x-ray, this may not show fracture of the rib.  For now she wants to hold off.  Continue as needed use of muscle relaxer and  anti-inflammatory    3. Alcohol dependence, episodic drinking behavior  4. Elevated liver enzymes  May be causing elevated liver tests, can also be related to medications.  Will recheck level.  - Comprehensive Metabolic Panel; Future    5. Skin rash  Requesting refill.  - tretinoin (Retin-A) 0.05 % cream; Apply  topically to the appropriate area as directed At Night As Needed (acne).  Dispense: 20 g; Refill: 2          BMI is within normal parameters. No other follow-up for BMI required.      Return in about 6 months (around 8/27/2024) for Recheck.    Future Appointments         Provider Department Center    3/11/2024 1:00 PM [facility] MAMM [facility]    4/17/2024 3:30 PM (Arrive by 3:00 PM) Ricardo Matt APRN [facility] BEHAVIORAL HEALTH [facility]    8/29/2024 11:30 AM Salvatore Townsend MD [facility] INTERNAL MEDICINE [facility]              Salvatore Townsend MD  Family Medicine  02/27/2024

## 2024-02-28 DIAGNOSIS — R74.8 ELEVATED LIVER ENZYMES: Primary | ICD-10-CM

## 2024-02-28 DIAGNOSIS — Z11.59 NEED FOR HEPATITIS B SCREENING TEST: ICD-10-CM

## 2024-02-28 DIAGNOSIS — Z11.59 NEED FOR HEPATITIS C SCREENING TEST: ICD-10-CM

## 2024-03-11 ENCOUNTER — HOSPITAL ENCOUNTER (OUTPATIENT)
Dept: MAMMOGRAPHY | Facility: HOSPITAL | Age: 64
Discharge: HOME | End: 2024-03-11
Admitting: STUDENT IN AN ORGANIZED HEALTH CARE EDUCATION/TRAINING PROGRAM
Payer: MEDICAID

## 2024-03-11 DIAGNOSIS — Z12.31 BREAST CANCER SCREENING BY MAMMOGRAM: ICD-10-CM

## 2024-03-11 PROCEDURE — 77067 SCR MAMMO BI INCL CAD: CPT

## 2024-03-11 PROCEDURE — 77063 BREAST TOMOSYNTHESIS BI: CPT

## 2024-03-14 DIAGNOSIS — G47.09 OTHER INSOMNIA: ICD-10-CM

## 2024-03-14 DIAGNOSIS — J30.1 SEASONAL ALLERGIC RHINITIS DUE TO POLLEN: ICD-10-CM

## 2024-03-14 RX ORDER — TRAZODONE HYDROCHLORIDE 50 MG/1
TABLET ORAL
Qty: 90 TABLET | Refills: 3 | Status: SHIPPED | OUTPATIENT
Start: 2024-03-14

## 2024-03-14 RX ORDER — FLUTICASONE PROPIONATE 50 MCG
SPRAY, SUSPENSION (ML) NASAL
Qty: 48 G | Refills: 3 | Status: SHIPPED | OUTPATIENT
Start: 2024-03-14

## 2024-04-10 DIAGNOSIS — F41.1 GENERALIZED ANXIETY DISORDER: Chronic | ICD-10-CM

## 2024-04-10 DIAGNOSIS — F32.1 CURRENT MODERATE EPISODE OF MAJOR DEPRESSIVE DISORDER, UNSPECIFIED WHETHER RECURRENT: Chronic | ICD-10-CM

## 2024-04-10 RX ORDER — FLUOXETINE HYDROCHLORIDE 20 MG/1
CAPSULE ORAL
Qty: 90 CAPSULE | Refills: 1 | Status: SHIPPED | OUTPATIENT
Start: 2024-04-10

## 2024-05-09 DIAGNOSIS — E78.2 MIXED HYPERLIPIDEMIA: ICD-10-CM

## 2024-05-09 RX ORDER — ATORVASTATIN CALCIUM 20 MG/1
TABLET, FILM COATED ORAL
Qty: 90 TABLET | Refills: 1 | Status: SHIPPED | OUTPATIENT
Start: 2024-05-09

## 2024-05-09 RX ORDER — AMLODIPINE BESYLATE 5 MG/1
TABLET ORAL
Qty: 30 TABLET | Refills: 3 | Status: SHIPPED | OUTPATIENT
Start: 2024-05-09

## 2024-06-02 DIAGNOSIS — F32.1 CURRENT MODERATE EPISODE OF MAJOR DEPRESSIVE DISORDER, UNSPECIFIED WHETHER RECURRENT: Chronic | ICD-10-CM

## 2024-06-02 DIAGNOSIS — F41.1 GENERALIZED ANXIETY DISORDER: Chronic | ICD-10-CM

## 2024-06-03 RX ORDER — FLUOXETINE HYDROCHLORIDE 20 MG/1
CAPSULE ORAL
Qty: 90 CAPSULE | Refills: 1 | Status: SHIPPED | OUTPATIENT
Start: 2024-06-03

## 2024-06-09 DIAGNOSIS — M47.816 LUMBAR SPONDYLOSIS: ICD-10-CM

## 2024-06-10 RX ORDER — MELOXICAM 15 MG/1
TABLET ORAL
Qty: 90 TABLET | Refills: 1 | Status: SHIPPED | OUTPATIENT
Start: 2024-06-10

## 2024-06-10 NOTE — TELEPHONE ENCOUNTER
Rx Refill Note  Requested Prescriptions     Pending Prescriptions Disp Refills    meloxicam (MOBIC) 15 MG tablet [Pharmacy Med Name: meloxicam 15 mg tablet] 90 tablet 1     Sig: TAKE ONE TABLET BY MOUTH EVERY DAY WITH FOOD      Last office visit with prescribing clinician: 2/27/2024   Last telemedicine visit with prescribing clinician: Visit date not found   Next office visit with prescribing clinician: 8/29/2024                         Would you like a call back once the refill request has been completed: [] Yes [] No    If the office needs to give you a call back, can they leave a voicemail: [] Yes [] No    Laurie Hernandez LPN  06/10/24, 11:48 EDT

## 2024-06-11 RX ORDER — CYCLOBENZAPRINE HCL 10 MG
TABLET ORAL
Qty: 30 TABLET | Refills: 2 | Status: SHIPPED | OUTPATIENT
Start: 2024-06-11

## 2024-07-08 DIAGNOSIS — F32.1 CURRENT MODERATE EPISODE OF MAJOR DEPRESSIVE DISORDER, UNSPECIFIED WHETHER RECURRENT: Chronic | ICD-10-CM

## 2024-07-08 RX ORDER — BUPROPION HYDROCHLORIDE 300 MG/1
300 TABLET ORAL DAILY
Qty: 90 TABLET | Refills: 0 | Status: SHIPPED | OUTPATIENT
Start: 2024-07-08

## 2024-07-22 RX ORDER — CETIRIZINE HYDROCHLORIDE 10 MG/1
10 TABLET ORAL
Qty: 30 TABLET | Refills: 5 | Status: SHIPPED | OUTPATIENT
Start: 2024-07-22

## 2024-08-12 DIAGNOSIS — R10.9 ABDOMINAL SPASMS: ICD-10-CM

## 2024-08-12 DIAGNOSIS — G47.09 OTHER INSOMNIA: ICD-10-CM

## 2024-08-12 RX ORDER — TRAZODONE HYDROCHLORIDE 50 MG/1
TABLET ORAL
Qty: 90 TABLET | Refills: 0 | Status: SHIPPED | OUTPATIENT
Start: 2024-08-12

## 2024-08-12 RX ORDER — DICYCLOMINE HCL 20 MG
20 TABLET ORAL EVERY 6 HOURS
Qty: 180 TABLET | Refills: 1 | Status: SHIPPED | OUTPATIENT
Start: 2024-08-12

## 2024-08-12 NOTE — TELEPHONE ENCOUNTER
Rx Refill Note  Requested Prescriptions     Pending Prescriptions Disp Refills    dicyclomine (BENTYL) 20 MG tablet [Pharmacy Med Name: dicyclomine 20 mg tablet] 60 tablet 0     Sig: TAKE ONE TABLET BY MOUTH EVERY 6 HOURS    traZODone (DESYREL) 50 MG tablet [Pharmacy Med Name: trazodone 50 mg tablet] 90 tablet 0     Sig: TAKE ONE TABLET BY MOUTH AT BEDTIME AS NEEDED FOR SLEEP      Last office visit with prescribing clinician: 2/27/2024   Last telemedicine visit with prescribing clinician: Visit date not found   Next office visit with prescribing clinician: 8/29/2024                         Would you like a call back once the refill request has been completed: [] Yes [] No    If the office needs to give you a call back, can they leave a voicemail: [] Yes [] No    Kendra Talley LPN  08/12/24, 14:45 EDT

## 2024-08-22 DIAGNOSIS — F41.1 GENERALIZED ANXIETY DISORDER: Chronic | ICD-10-CM

## 2024-08-22 DIAGNOSIS — F32.1 CURRENT MODERATE EPISODE OF MAJOR DEPRESSIVE DISORDER, UNSPECIFIED WHETHER RECURRENT: Chronic | ICD-10-CM

## 2024-08-22 RX ORDER — FLUOXETINE HYDROCHLORIDE 20 MG/1
CAPSULE ORAL
Qty: 90 CAPSULE | Refills: 1 | Status: SHIPPED | OUTPATIENT
Start: 2024-08-22

## 2024-08-29 ENCOUNTER — LAB (OUTPATIENT)
Dept: LAB | Facility: HOSPITAL | Age: 64
End: 2024-08-29
Payer: MEDICAID

## 2024-08-29 ENCOUNTER — OFFICE VISIT (OUTPATIENT)
Dept: INTERNAL MEDICINE | Facility: CLINIC | Age: 64
End: 2024-08-29
Payer: MEDICAID

## 2024-08-29 VITALS
SYSTOLIC BLOOD PRESSURE: 120 MMHG | DIASTOLIC BLOOD PRESSURE: 78 MMHG | BODY MASS INDEX: 21.62 KG/M2 | WEIGHT: 103 LBS | HEIGHT: 58 IN | HEART RATE: 60 BPM | TEMPERATURE: 97.8 F

## 2024-08-29 DIAGNOSIS — G47.09 OTHER INSOMNIA: ICD-10-CM

## 2024-08-29 DIAGNOSIS — N94.10 DYSPAREUNIA IN FEMALE: ICD-10-CM

## 2024-08-29 DIAGNOSIS — M72.2 PLANTAR FASCIITIS, BILATERAL: ICD-10-CM

## 2024-08-29 DIAGNOSIS — N95.2 POSTMENOPAUSAL ATROPHIC VAGINITIS: ICD-10-CM

## 2024-08-29 DIAGNOSIS — F33.42 RECURRENT MAJOR DEPRESSIVE DISORDER, IN FULL REMISSION: Chronic | ICD-10-CM

## 2024-08-29 DIAGNOSIS — F10.20 ALCOHOL DEPENDENCE, EPISODIC DRINKING BEHAVIOR: ICD-10-CM

## 2024-08-29 DIAGNOSIS — B35.1 NAIL FUNGUS: ICD-10-CM

## 2024-08-29 DIAGNOSIS — R74.8 ELEVATED LIVER ENZYMES: ICD-10-CM

## 2024-08-29 DIAGNOSIS — Z11.59 NEED FOR HEPATITIS C SCREENING TEST: ICD-10-CM

## 2024-08-29 DIAGNOSIS — Z11.59 NEED FOR HEPATITIS B SCREENING TEST: ICD-10-CM

## 2024-08-29 DIAGNOSIS — I10 ESSENTIAL HYPERTENSION: Chronic | ICD-10-CM

## 2024-08-29 DIAGNOSIS — F41.1 GENERALIZED ANXIETY DISORDER: Chronic | ICD-10-CM

## 2024-08-29 DIAGNOSIS — M81.0 OSTEOPOROSIS WITHOUT CURRENT PATHOLOGICAL FRACTURE, UNSPECIFIED OSTEOPOROSIS TYPE: Chronic | ICD-10-CM

## 2024-08-29 DIAGNOSIS — I10 ESSENTIAL HYPERTENSION: Primary | Chronic | ICD-10-CM

## 2024-08-29 DIAGNOSIS — R10.9 ABDOMINAL SPASMS: ICD-10-CM

## 2024-08-29 PROBLEM — F12.91 HISTORY OF MARIJUANA USE: Status: ACTIVE | Noted: 2021-11-23

## 2024-08-29 PROCEDURE — 86706 HEP B SURFACE ANTIBODY: CPT

## 2024-08-29 PROCEDURE — 3074F SYST BP LT 130 MM HG: CPT | Performed by: STUDENT IN AN ORGANIZED HEALTH CARE EDUCATION/TRAINING PROGRAM

## 2024-08-29 PROCEDURE — 1159F MED LIST DOCD IN RCRD: CPT | Performed by: STUDENT IN AN ORGANIZED HEALTH CARE EDUCATION/TRAINING PROGRAM

## 2024-08-29 PROCEDURE — 1160F RVW MEDS BY RX/DR IN RCRD: CPT | Performed by: STUDENT IN AN ORGANIZED HEALTH CARE EDUCATION/TRAINING PROGRAM

## 2024-08-29 PROCEDURE — 80048 BASIC METABOLIC PNL TOTAL CA: CPT

## 2024-08-29 PROCEDURE — 1125F AMNT PAIN NOTED PAIN PRSNT: CPT | Performed by: STUDENT IN AN ORGANIZED HEALTH CARE EDUCATION/TRAINING PROGRAM

## 2024-08-29 PROCEDURE — 86803 HEPATITIS C AB TEST: CPT

## 2024-08-29 PROCEDURE — 80076 HEPATIC FUNCTION PANEL: CPT

## 2024-08-29 PROCEDURE — 87340 HEPATITIS B SURFACE AG IA: CPT

## 2024-08-29 PROCEDURE — 99214 OFFICE O/P EST MOD 30 MIN: CPT | Performed by: STUDENT IN AN ORGANIZED HEALTH CARE EDUCATION/TRAINING PROGRAM

## 2024-08-29 PROCEDURE — 3078F DIAST BP <80 MM HG: CPT | Performed by: STUDENT IN AN ORGANIZED HEALTH CARE EDUCATION/TRAINING PROGRAM

## 2024-08-29 RX ORDER — DICYCLOMINE HCL 20 MG
20 TABLET ORAL EVERY 6 HOURS
Qty: 180 TABLET | Refills: 1 | Status: SHIPPED | OUTPATIENT
Start: 2024-08-29

## 2024-08-29 RX ORDER — TRAZODONE HYDROCHLORIDE 50 MG/1
50 TABLET, FILM COATED ORAL NIGHTLY PRN
Qty: 90 TABLET | Refills: 1 | Status: SHIPPED | OUTPATIENT
Start: 2024-08-29

## 2024-08-29 RX ORDER — ESTRADIOL 10 UG/1
1 INSERT VAGINAL 2 TIMES WEEKLY
Qty: 8 TABLET | Refills: 5 | Status: SHIPPED | OUTPATIENT
Start: 2024-08-29

## 2024-08-29 NOTE — PROGRESS NOTES
Chief Complaint  Areli Barnett is a 64 y.o. female presenting for Hypertension, Lost bone medication, and lung test.     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      History of Present Illness  Patient is here for follow-up.    She is currently looking for work, hoping to get a job at [employer] in [facility].  She has not smoked marijuana for the last 4-5 months.  She remains sober does not drink alcohol anymore.    She is asking about lung cancer screening because of her history of smoking.  She did smoke marijuana for about 20 years daily.  She smokes cigarettes for 5 years around age 20, she smoked 1 pack a day.  She quit and then started back around age 42 and smoked for 5 years about half a pack daily.  She does not vape anymore.  Currently no symptoms, no cough.    She also was on estrogen vaginal cream with applicator, and she felt it was messy.  She is still experiencing vaginal dryness and is asking about other options.  She has seen a recent commercial with new product, but does not remember the name.  She has not tried Vagifem.    She also has complaining of foot pain, she has inlays.  She is asking for referral to podiatry, she saw podiatry in the past.  Also concern for right great toenail that continues to break off.    She is also asking about treatment for osteoporosis, if she would be eligible for injection treatment.  She has been on Fosamax for years.      The following portions of the patient's history were reviewed and updated as appropriate: allergies, current medications, past family history, past medical history, past social history, past surgical history, and problem list.    Objective  /78 (BP Location: Left arm,  "Patient Position: Sitting, Cuff Size: Adult)   Pulse 60   Temp 97.8 °F (36.6 °C) (Temporal)   Ht 147.3 cm (57.99\")   Wt 46.7 kg (103 lb)   LMP  (LMP Unknown)   BMI 21.53 kg/m²     Physical Exam  Constitutional:       Appearance: Normal appearance.   HENT:      Head: Normocephalic and atraumatic.   Eyes:      Extraocular Movements: Extraocular movements intact.      Conjunctiva/sclera: Conjunctivae normal.   Pulmonary:      Effort: Pulmonary effort is normal. No respiratory distress.   Musculoskeletal:      Cervical back: Normal range of motion and neck supple.   Neurological:      Mental Status: She is alert and oriented to person, place, and time. Mental status is at baseline.   Psychiatric:         Behavior: Behavior normal.         Thought Content: Thought content normal.         Assessment/Plan   1. Essential hypertension  BP Readings from Last 3 Encounters:   08/29/24 120/78   05/07/24 112/72   02/27/24 124/80   Blood pressure at goal and well-controlled.  Continue on current medications.  - Basic Metabolic Panel; Future    2. Abdominal spasms  Stable.  Continue on dicyclomine.  - dicyclomine (BENTYL) 20 MG tablet; Take 1 tablet by mouth Every 6 (Six) Hours. PRN  Dispense: 180 tablet; Refill: 1    3. Other insomnia  Continue as needed use of trazodone, does not take every night.  - traZODone (DESYREL) 50 MG tablet; Take 1 tablet by mouth At Night As Needed for Sleep.  Dispense: 90 tablet; Refill: 1    4. Osteoporosis without current pathological fracture, unspecified osteoporosis type  Just on the line of osteoporosis, minimal change from 20 18-20 22.  She is due for repeat now.  If worsening bone density consider injectable like Prolia.  - DEXA Bone Density Axial; Future    5. Postmenopausal atrophic vaginitis  6. Dyspareunia in female  Recommend trial of Vagifem, hopefully this will be a better solution than the cream.  - estradiol (VAGIFEM) 10 MCG tablet vaginal tablet; Insert 1 tablet into the vagina " 2 (Two) Times a Week.  Dispense: 8 tablet; Refill: 5    7. Plantar fasciitis, bilateral  8. Nail fungus  Nail changes of the right great toenail.  Will refer to podiatry  - Ambulatory Referral to Podiatry  - Diclofenac Sodium (VOLTAREN) 1 % gel gel; Apply 4 g topically to the appropriate area as directed 4 (Four) Times a Day As Needed (Pain).  Dispense: 350 g; Refill: 1    9. Alcohol dependence, episodic drinking behavior  Doing well sober.    10. Recurrent major depressive disorder, in full remission  11. Generalized anxiety disorder  Stable and doing well.  Continue on Prozac.    BMI is within normal parameters. No other follow-up for BMI required.      Return in about 26 weeks (around 2/27/2025), or if symptoms worsen or fail to improve, for Annual physical.    Future Appointments         Provider Department Center    3/4/2025 9:15 AM Salvatore Townsend MD [facility]IC[facility]              Salvatore Townsend MD  Family Medicine  08/29/2024

## 2024-08-30 LAB
ANION GAP SERPL CALCULATED.3IONS-SCNC: 8.6 MMOL/L (ref 5–15)
BUN SERPL-MCNC: 12 MG/DL (ref 8–23)
BUN/CREAT SERPL: 14.5 (ref 7–25)
CALCIUM SPEC-SCNC: 9.4 MG/DL (ref 8.6–10.5)
CHLORIDE SERPL-SCNC: 99 MMOL/L (ref 98–107)
CO2 SERPL-SCNC: 26.4 MMOL/L (ref 22–29)
CREAT SERPL-MCNC: 0.83 MG/DL (ref 0.57–1)
EGFRCR SERPLBLD CKD-EPI 2021: 79.8 ML/MIN/1.73
GLUCOSE SERPL-MCNC: 81 MG/DL (ref 65–99)
POTASSIUM SERPL-SCNC: 4.2 MMOL/L (ref 3.5–5.2)
SODIUM SERPL-SCNC: 134 MMOL/L (ref 136–145)

## 2024-08-31 LAB
ALBUMIN SERPL-MCNC: 4.6 G/DL (ref 3.9–4.9)
ALP SERPL-CCNC: 60 IU/L (ref 44–121)
ALT SERPL-CCNC: 23 IU/L (ref 0–32)
AST SERPL-CCNC: 27 IU/L (ref 0–40)
BILIRUB DIRECT SERPL-MCNC: <0.1 MG/DL (ref 0–0.4)
BILIRUB SERPL-MCNC: <0.2 MG/DL (ref 0–1.2)
HBV SURFACE AB SER QL: NON REACTIVE
HBV SURFACE AG SERPL QL IA: NEGATIVE
HCV IGG SERPL QL IA: NON REACTIVE
PROT SERPL-MCNC: 6.7 G/DL (ref 6–8.5)

## 2024-09-11 DIAGNOSIS — H10.13 ALLERGIC CONJUNCTIVITIS OF BOTH EYES: ICD-10-CM

## 2024-09-12 RX ORDER — OLOPATADINE HYDROCHLORIDE 1 MG/ML
SOLUTION/ DROPS OPHTHALMIC
Qty: 5 ML | Refills: 5 | Status: SHIPPED | OUTPATIENT
Start: 2024-09-12

## 2024-09-20 RX ORDER — AMLODIPINE BESYLATE 5 MG/1
TABLET ORAL
Qty: 30 TABLET | Refills: 3 | Status: SHIPPED | OUTPATIENT
Start: 2024-09-20

## 2024-10-16 DIAGNOSIS — F32.1 CURRENT MODERATE EPISODE OF MAJOR DEPRESSIVE DISORDER, UNSPECIFIED WHETHER RECURRENT: Chronic | ICD-10-CM

## 2024-10-16 RX ORDER — BUPROPION HYDROCHLORIDE 300 MG/1
300 TABLET ORAL DAILY
Qty: 90 TABLET | Refills: 0 | OUTPATIENT
Start: 2024-10-16

## 2024-10-16 NOTE — TELEPHONE ENCOUNTER
Pt phone number is no longer in service. [email or web address] message already sent regarding making an appointment for refills.

## 2024-10-31 DIAGNOSIS — F41.1 GENERALIZED ANXIETY DISORDER: Chronic | ICD-10-CM

## 2024-10-31 DIAGNOSIS — F32.1 CURRENT MODERATE EPISODE OF MAJOR DEPRESSIVE DISORDER, UNSPECIFIED WHETHER RECURRENT: Chronic | ICD-10-CM

## 2024-11-14 DIAGNOSIS — M81.0 OSTEOPOROSIS, UNSPECIFIED OSTEOPOROSIS TYPE, UNSPECIFIED PATHOLOGICAL FRACTURE PRESENCE: ICD-10-CM

## 2024-11-14 RX ORDER — ALENDRONATE SODIUM 70 MG/1
TABLET ORAL
Qty: 12 TABLET | Refills: 3 | Status: SHIPPED | OUTPATIENT
Start: 2024-11-14

## 2024-11-14 NOTE — TELEPHONE ENCOUNTER
Rx Refill Note  Requested Prescriptions     Pending Prescriptions Disp Refills    alendronate (FOSAMAX) 70 MG tablet [Pharmacy Med Name: alendronate 70 mg tablet] 12 tablet 3     Sig: TAKE ONE TABLET BY MOUTH EVERY 7 DAYS      Last office visit with prescribing clinician: 8/29/2024   Last telemedicine visit with prescribing clinician: Visit date not found   Next office visit with prescribing clinician: 3/4/2025                         Would you like a call back once the refill request has been completed: [] Yes [] No    If the office needs to give you a call back, can they leave a voicemail: [] Yes [] No    Laurie Hernandez LPN  11/14/24, 08:13 EST

## 2024-11-16 DIAGNOSIS — M72.2 PLANTAR FASCIITIS, BILATERAL: ICD-10-CM

## 2024-11-18 NOTE — TELEPHONE ENCOUNTER
Rx Refill Note  Requested Prescriptions     Pending Prescriptions Disp Refills    Diclofenac Sodium (VOLTAREN) 1 % gel gel [Pharmacy Med Name: diclofenac 1 % topical gel] 350 g 1     Sig: APPLY FOUR GRAMS TO affected area AS DIRECTED FOUR TIMES DAILY AS NEEDED FOR PAIN      Last office visit with prescribing clinician: 8/29/2024   Last telemedicine visit with prescribing clinician: Visit date not found   Next office visit with prescribing clinician: 3/4/2025                         Would you like a call back once the refill request has been completed: [] Yes [] No    If the office needs to give you a call back, can they leave a voicemail: [] Yes [] No    Laurie Hernandez LPN  11/18/24, 08:10 EST

## 2024-12-01 DIAGNOSIS — E78.2 MIXED HYPERLIPIDEMIA: ICD-10-CM

## 2024-12-02 RX ORDER — ATORVASTATIN CALCIUM 20 MG/1
TABLET, FILM COATED ORAL
Qty: 90 TABLET | Refills: 1 | Status: SHIPPED | OUTPATIENT
Start: 2024-12-02

## 2024-12-02 NOTE — TELEPHONE ENCOUNTER
Rx Refill Note  Requested Prescriptions     Pending Prescriptions Disp Refills    atorvastatin (LIPITOR) 20 MG tablet [Pharmacy Med Name: atorvastatin 20 mg tablet] 90 tablet 1     Sig: TAKE ONE TABLET BY MOUTH AT BEDTIME      Last office visit with prescribing clinician: 8/29/2024   Last telemedicine visit with prescribing clinician: Visit date not found   Next office visit with prescribing clinician: 3/4/2025                         Would you like a call back once the refill request has been completed: [] Yes [] No    If the office needs to give you a call back, can they leave a voicemail: [] Yes [] No    Laurie Hernandez LPN  12/02/24, 09:16 EST

## 2024-12-26 DIAGNOSIS — K21.00 GASTROESOPHAGEAL REFLUX DISEASE WITH ESOPHAGITIS WITHOUT HEMORRHAGE: Chronic | ICD-10-CM

## 2024-12-26 RX ORDER — FAMOTIDINE 40 MG/1
TABLET, FILM COATED ORAL
Qty: 90 TABLET | Refills: 3 | Status: SHIPPED | OUTPATIENT
Start: 2024-12-26

## 2024-12-26 NOTE — TELEPHONE ENCOUNTER
Rx Refill Note  Requested Prescriptions     Pending Prescriptions Disp Refills    famotidine (PEPCID) 40 MG tablet [Pharmacy Med Name: famotidine 40 mg tablet] 90 tablet 3     Sig: TAKE ONE TABLET BY MOUTH EVERY DAY AS NEEDED FOR heartburn      Last office visit with prescribing clinician: 8/29/2024   Last telemedicine visit with prescribing clinician: Visit date not found   Next office visit with prescribing clinician: 3/4/2025                         Would you like a call back once the refill request has been completed: [] Yes [] No    If the office needs to give you a call back, can they leave a voicemail: [] Yes [] No    Ilene Mccallum MA  12/26/24, 14:56 EST

## 2025-01-01 DIAGNOSIS — F32.1 CURRENT MODERATE EPISODE OF MAJOR DEPRESSIVE DISORDER, UNSPECIFIED WHETHER RECURRENT: Chronic | ICD-10-CM

## 2025-01-01 DIAGNOSIS — F41.1 GENERALIZED ANXIETY DISORDER: Chronic | ICD-10-CM

## 2025-01-02 NOTE — TELEPHONE ENCOUNTER
Rx Refill Note  Requested Prescriptions     Pending Prescriptions Disp Refills    FLUoxetine (PROzac) 20 MG capsule [Pharmacy Med Name: fluoxetine 20 mg capsule] 90 capsule 1     Sig: TAKE THREE CAPSULES BY MOUTH EVERY DAY      Last office visit with prescribing clinician: 8/29/2024   Last telemedicine visit with prescribing clinician: Visit date not found   Next office visit with prescribing clinician: 3/4/2025                         Would you like a call back once the refill request has been completed: [] Yes [] No    If the office needs to give you a call back, can they leave a voicemail: [] Yes [] No    Lisette Flower MA  01/02/25, 09:16 EST

## 2025-01-08 DIAGNOSIS — M47.816 LUMBAR SPONDYLOSIS: ICD-10-CM

## 2025-01-08 RX ORDER — MELOXICAM 15 MG/1
TABLET ORAL
Qty: 90 TABLET | Refills: 1 | Status: SHIPPED | OUTPATIENT
Start: 2025-01-08

## 2025-01-08 NOTE — TELEPHONE ENCOUNTER
Rx Refill Note  Requested Prescriptions     Pending Prescriptions Disp Refills    meloxicam (MOBIC) 15 MG tablet [Pharmacy Med Name: meloxicam 15 mg tablet] 90 tablet 1     Sig: TAKE ONE TABLET BY MOUTH EVERY DAY WITH FOOD      Last office visit with prescribing clinician: 8/29/2024   Last telemedicine visit with prescribing clinician: Visit date not found   Next office visit with prescribing clinician: 3/4/2025                         Would you like a call back once the refill request has been completed: [] Yes [] No    If the office needs to give you a call back, can they leave a voicemail: [] Yes [] No    Lisette Flower MA  01/08/25, 16:27 EST

## 2025-02-05 RX ORDER — AMLODIPINE BESYLATE 5 MG/1
5 TABLET ORAL DAILY
Qty: 30 TABLET | Refills: 3 | Status: SHIPPED | OUTPATIENT
Start: 2025-02-05

## 2025-02-05 NOTE — TELEPHONE ENCOUNTER
Rx Refill Note  Requested Prescriptions     Pending Prescriptions Disp Refills    amLODIPine (NORVASC) 5 MG tablet 30 tablet 3     Sig: Take 1 tablet by mouth Daily.      Last office visit with prescribing clinician: 8/29/2024   Last telemedicine visit with prescribing clinician: Visit date not found   Next office visit with prescribing clinician: 3/4/2025                         Would you like a call back once the refill request has been completed: [] Yes [] No    If the office needs to give you a call back, can they leave a voicemail: [] Yes [] No    Maame Harris MA  02/05/25, 13:48 EST

## 2025-02-28 RX ORDER — CETIRIZINE HYDROCHLORIDE 10 MG/1
10 TABLET ORAL
Qty: 30 TABLET | Refills: 5 | Status: SHIPPED | OUTPATIENT
Start: 2025-02-28

## 2025-03-04 ENCOUNTER — OFFICE VISIT (OUTPATIENT)
Dept: INTERNAL MEDICINE | Facility: CLINIC | Age: 65
End: 2025-03-04
Payer: MEDICAID

## 2025-03-04 ENCOUNTER — LAB (OUTPATIENT)
Dept: LAB | Facility: HOSPITAL | Age: 65
End: 2025-03-04
Payer: MEDICAID

## 2025-03-04 VITALS
HEART RATE: 90 BPM | OXYGEN SATURATION: 98 % | SYSTOLIC BLOOD PRESSURE: 110 MMHG | HEIGHT: 58 IN | WEIGHT: 114 LBS | DIASTOLIC BLOOD PRESSURE: 60 MMHG | BODY MASS INDEX: 23.93 KG/M2 | TEMPERATURE: 98.2 F

## 2025-03-04 DIAGNOSIS — I10 ESSENTIAL HYPERTENSION: Chronic | ICD-10-CM

## 2025-03-04 DIAGNOSIS — E78.2 MIXED HYPERLIPIDEMIA: ICD-10-CM

## 2025-03-04 DIAGNOSIS — F10.20 ALCOHOL DEPENDENCE, EPISODIC DRINKING BEHAVIOR: ICD-10-CM

## 2025-03-04 DIAGNOSIS — F41.1 GENERALIZED ANXIETY DISORDER: Chronic | ICD-10-CM

## 2025-03-04 DIAGNOSIS — J98.8 VIRAL RESPIRATORY INFECTION: ICD-10-CM

## 2025-03-04 DIAGNOSIS — F33.42 RECURRENT MAJOR DEPRESSIVE DISORDER, IN FULL REMISSION: Chronic | ICD-10-CM

## 2025-03-04 DIAGNOSIS — Z12.31 BREAST CANCER SCREENING BY MAMMOGRAM: ICD-10-CM

## 2025-03-04 DIAGNOSIS — M25.552 LEFT HIP PAIN: ICD-10-CM

## 2025-03-04 DIAGNOSIS — B97.89 VIRAL RESPIRATORY INFECTION: ICD-10-CM

## 2025-03-04 DIAGNOSIS — N94.10 DYSPAREUNIA IN FEMALE: ICD-10-CM

## 2025-03-04 DIAGNOSIS — N95.2 POSTMENOPAUSAL ATROPHIC VAGINITIS: ICD-10-CM

## 2025-03-04 DIAGNOSIS — Z00.00 WELL ADULT EXAM: Primary | ICD-10-CM

## 2025-03-04 LAB
ALBUMIN SERPL-MCNC: 4.6 G/DL (ref 3.5–5.2)
ALBUMIN/GLOB SERPL: 1.8 G/DL
ALP SERPL-CCNC: 62 U/L (ref 39–117)
ALT SERPL W P-5'-P-CCNC: 13 U/L (ref 1–33)
ANION GAP SERPL CALCULATED.3IONS-SCNC: 10.3 MMOL/L (ref 5–15)
AST SERPL-CCNC: 26 U/L (ref 1–32)
BILIRUB SERPL-MCNC: 0.4 MG/DL (ref 0–1.2)
BUN SERPL-MCNC: 15 MG/DL (ref 8–23)
BUN/CREAT SERPL: 18.3 (ref 7–25)
CALCIUM SPEC-SCNC: 9.5 MG/DL (ref 8.6–10.5)
CHLORIDE SERPL-SCNC: 101 MMOL/L (ref 98–107)
CHOLEST SERPL-MCNC: 206 MG/DL (ref 0–200)
CO2 SERPL-SCNC: 25.7 MMOL/L (ref 22–29)
CREAT SERPL-MCNC: 0.82 MG/DL (ref 0.57–1)
DEPRECATED RDW RBC AUTO: 40.4 FL (ref 37–54)
EGFRCR SERPLBLD CKD-EPI 2021: 80.5 ML/MIN/1.73
ERYTHROCYTE [DISTWIDTH] IN BLOOD BY AUTOMATED COUNT: 12.2 % (ref 12.3–15.4)
EXPIRATION DATE: NORMAL
FLUAV AG UPPER RESP QL IA.RAPID: NOT DETECTED
FLUBV AG UPPER RESP QL IA.RAPID: NOT DETECTED
GLOBULIN UR ELPH-MCNC: 2.5 GM/DL
GLUCOSE SERPL-MCNC: 88 MG/DL (ref 65–99)
HCT VFR BLD AUTO: 39.8 % (ref 34–46.6)
HDLC SERPL-MCNC: 83 MG/DL (ref 40–60)
HGB BLD-MCNC: 13.5 G/DL (ref 12–15.9)
INTERNAL CONTROL: NORMAL
LDLC SERPL CALC-MCNC: 113 MG/DL (ref 0–100)
LDLC/HDLC SERPL: 1.35 {RATIO}
Lab: NORMAL
MCH RBC QN AUTO: 31 PG (ref 26.6–33)
MCHC RBC AUTO-ENTMCNC: 33.9 G/DL (ref 31.5–35.7)
MCV RBC AUTO: 91.5 FL (ref 79–97)
PLATELET # BLD AUTO: 302 10*3/MM3 (ref 140–450)
PMV BLD AUTO: 8.6 FL (ref 6–12)
POTASSIUM SERPL-SCNC: 4.3 MMOL/L (ref 3.5–5.2)
PROT SERPL-MCNC: 7.1 G/DL (ref 6–8.5)
RBC # BLD AUTO: 4.35 10*6/MM3 (ref 3.77–5.28)
SARS-COV-2 AG UPPER RESP QL IA.RAPID: NOT DETECTED
SODIUM SERPL-SCNC: 137 MMOL/L (ref 136–145)
TRIGL SERPL-MCNC: 55 MG/DL (ref 0–150)
TSH SERPL DL<=0.05 MIU/L-ACNC: 1.61 UIU/ML (ref 0.27–4.2)
VLDLC SERPL-MCNC: 10 MG/DL (ref 5–40)
WBC NRBC COR # BLD AUTO: 8.66 10*3/MM3 (ref 3.4–10.8)

## 2025-03-04 PROCEDURE — 3074F SYST BP LT 130 MM HG: CPT | Performed by: STUDENT IN AN ORGANIZED HEALTH CARE EDUCATION/TRAINING PROGRAM

## 2025-03-04 PROCEDURE — 84443 ASSAY THYROID STIM HORMONE: CPT

## 2025-03-04 PROCEDURE — 1125F AMNT PAIN NOTED PAIN PRSNT: CPT | Performed by: STUDENT IN AN ORGANIZED HEALTH CARE EDUCATION/TRAINING PROGRAM

## 2025-03-04 PROCEDURE — 99396 PREV VISIT EST AGE 40-64: CPT | Performed by: STUDENT IN AN ORGANIZED HEALTH CARE EDUCATION/TRAINING PROGRAM

## 2025-03-04 PROCEDURE — 1159F MED LIST DOCD IN RCRD: CPT | Performed by: STUDENT IN AN ORGANIZED HEALTH CARE EDUCATION/TRAINING PROGRAM

## 2025-03-04 PROCEDURE — 3078F DIAST BP <80 MM HG: CPT | Performed by: STUDENT IN AN ORGANIZED HEALTH CARE EDUCATION/TRAINING PROGRAM

## 2025-03-04 PROCEDURE — 80061 LIPID PANEL: CPT

## 2025-03-04 PROCEDURE — 87428 SARSCOV & INF VIR A&B AG IA: CPT | Performed by: STUDENT IN AN ORGANIZED HEALTH CARE EDUCATION/TRAINING PROGRAM

## 2025-03-04 PROCEDURE — 85027 COMPLETE CBC AUTOMATED: CPT

## 2025-03-04 PROCEDURE — 80053 COMPREHEN METABOLIC PANEL: CPT

## 2025-03-04 PROCEDURE — 1160F RVW MEDS BY RX/DR IN RCRD: CPT | Performed by: STUDENT IN AN ORGANIZED HEALTH CARE EDUCATION/TRAINING PROGRAM

## 2025-03-04 RX ORDER — ESTRADIOL 10 UG/1
TABLET, FILM COATED VAGINAL
Qty: 8 TABLET | Refills: 5 | Status: SHIPPED | OUTPATIENT
Start: 2025-03-04

## 2025-03-04 NOTE — PROGRESS NOTES
"Chief Complaint  Areli Barnett is a 65 y.o. female presenting for Annual Exam and Nasal Congestion.     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      History of Present Illness  Patient is here for annual physical and follow-up with additional concerns.    Her warfarin got sick Sunday 2 days ago, she started getting sick last night but cough, runny nose, body aches, irritation of her throat.  Also mild aches, no fever, but feeling cold last night.  This morning also some phlegm.  No shortness of breath or wheezing.    She also has noticed pain of her left hip for the last 2 weeks, more when laying on her left side.  Turning to the other side relieves/resolves the pain.  No injury.    She has not been doing much exercise recently.  She did go to the pool yesterday.    She is doing well with her mental health.  She stopped taking Wellbutrin 4 months ago, and has not noticed any worsening anxiety or return of depression.    She does consume alcohol typically on the weekends, drinking 4-5 units each night.    The following portions of the patient's history were reviewed and updated as appropriate: allergies, current medications, past family history, past medical history, past social history, past surgical history, and problem list.    Objective  /60 (BP Location: Left arm, Patient Position: Sitting, Cuff Size: Adult)   Pulse 90   Temp 98.2 °F (36.8 °C) (Infrared)   Ht 147.3 cm (57.99\")   Wt 51.7 kg (114 lb)   LMP  (LMP Unknown)   SpO2 98%   BMI 23.83 kg/m²     Physical Exam  Vitals reviewed.   Constitutional:       Appearance: Normal appearance.   HENT:      Head: Normocephalic and atraumatic.      Right Ear: Tympanic membrane, ear canal and " external ear normal. There is no impacted cerumen.      Left Ear: Tympanic membrane, ear canal and external ear normal. There is no impacted cerumen.      Nose: Nose normal. Congestion present.      Mouth/Throat:      Mouth: Mucous membranes are moist.      Pharynx: Oropharynx is clear.   Eyes:      Extraocular Movements: Extraocular movements intact.      Conjunctiva/sclera: Conjunctivae normal.   Cardiovascular:      Rate and Rhythm: Normal rate and regular rhythm.      Heart sounds: Normal heart sounds. No murmur heard.  Pulmonary:      Effort: Pulmonary effort is normal.      Breath sounds: Normal breath sounds.   Abdominal:      General: There is no distension.      Palpations: Abdomen is soft. There is no mass.      Tenderness: There is no abdominal tenderness.   Musculoskeletal:         General: Tenderness present.      Cervical back: Neck supple. No tenderness.      Right lower leg: No edema.      Left lower leg: No edema.      Comments: Left hip: Minimal tenderness to palpation.  Good ROM.  No significant pain with internal or external rotation on 90 degree flexion.   Lymphadenopathy:      Cervical: No cervical adenopathy.   Skin:     General: Skin is warm and dry.   Neurological:      Mental Status: She is alert and oriented to person, place, and time. Mental status is at baseline.   Psychiatric:         Behavior: Behavior normal.         Thought Content: Thought content normal.         Assessment/Plan   1. Well adult exam  Counseled on recommendations for annual flu shot, COVID booster.  She is up-to-date on flu vaccine.  Counseled on recommendations for annual mammogram, she is amenable to new order at this time.  Counseled on recommendations for bone density screening every 2 years, she has active order that expires in August, she will try to get that scheduled.  Counseled on recommendations for regular cervical cancer Pap smears every 3-5 years.  She had her last 1 done in 2021, they recommend  screening until age 65.  She plans to see OB/GYN back for Pap smear.  Discussed testing for sexually transmitted infection, she does not feel the need for testing at this time.    2. Viral respiratory infection  Likely viral infection.  Given this early we cannot rule out COVID or flu even with negative testing.  Sometimes tests are negative the first 1-2 days.  However she is well-appearing, so for now I recommend monitoring symptoms.  If she would get a lot worse over the next couple weeks she should get back in touch.  - POCT SARS-CoV-2 + Flu Antigen HUBERT    3. Left hip pain  Could be mild bursitis.  Symptoms are fairly mild.  Recommend activity and movement as tolerated.  Avoid laying on the left side if causes pain.  If no improvement over the next few weeks consider physical therapy versus referral to sports medicine for possible injections.    4. Essential hypertension  BP Readings from Last 3 Encounters:   03/04/25 110/60   08/29/24 120/78   05/07/24 112/72   Blood pressure well-controlled.  Continue current medications.  Recheck blood work.  - Comprehensive Metabolic Panel; Future  - TSH Rfx On Abnormal To Free T4; Future  - CBC (No Diff); Future    5. Mixed hyperlipidemia  Stable.  Continue on atorvastatin.  Recheck fasting lipids today.  - Lipid Panel; Future    6. Alcohol dependence, episodic drinking behavior  Patient does not drink through the weeks, drinks on the weekends.    7. Generalized anxiety disorder  8. Recurrent major depressive disorder, in full remission  Appears to be doing well.  Continue to monitor symptoms without Wellbutrin for now.  Follow-up in 6 months.    9. Breast cancer screening by mammogram  - Mammo Screening Digital Tomosynthesis Bilateral With CAD; Future    BMI is within normal parameters. No other follow-up for BMI required.      Return in about 6 months (around 9/4/2025), or if symptoms worsen or fail to improve, for Recheck.    Future Appointments         Provider  Department Center    3/4/2025 10:30 AM [facility]              Salvatore Townsend MD  Family Medicine  03/04/2025

## 2025-03-04 NOTE — TELEPHONE ENCOUNTER
Rx Refill Note  Requested Prescriptions     Pending Prescriptions Disp Refills    estradiol (VAGIFEM) 10 MCG tablet vaginal tablet [Pharmacy Med Name: estradiol 10 mcg vaginal tablet] 8 tablet 5     Sig: INSERT ONE TABLET VAGINALLY TWICE WEEKLY      Last office visit with prescribing clinician: 8/29/2024   Last telemedicine visit with prescribing clinician: Visit date not found   Next office visit with prescribing clinician: 3/4/2025                         Would you like a call back once the refill request has been completed: [] Yes [] No    If the office needs to give you a call back, can they leave a voicemail: [] Yes [] No    Laurie Hernandez LPN  03/04/25, 08:52 EST

## 2025-03-07 DIAGNOSIS — F41.1 GENERALIZED ANXIETY DISORDER: Chronic | ICD-10-CM

## 2025-03-07 DIAGNOSIS — F32.1 CURRENT MODERATE EPISODE OF MAJOR DEPRESSIVE DISORDER, UNSPECIFIED WHETHER RECURRENT: Chronic | ICD-10-CM

## 2025-03-17 DIAGNOSIS — J30.1 SEASONAL ALLERGIC RHINITIS DUE TO POLLEN: ICD-10-CM

## 2025-03-17 RX ORDER — FLUTICASONE PROPIONATE 50 MCG
SPRAY, SUSPENSION (ML) NASAL
Qty: 48 G | Refills: 3 | Status: SHIPPED | OUTPATIENT
Start: 2025-03-17

## 2025-03-17 NOTE — TELEPHONE ENCOUNTER
Rx Refill Note  Requested Prescriptions     Pending Prescriptions Disp Refills    fluticasone (FLONASE) 50 MCG/ACT nasal spray [Pharmacy Med Name: fluticasone propionate 50 mcg/actuation nasal spray,suspension] 48 g 3     Sig: INHALE 1 SPRAY IN EACH NOSTRIL TWICE DAILY (shake WELL BEFORE using)      Last office visit with prescribing clinician: 3/4/2025   Last telemedicine visit with prescribing clinician: Visit date not found   Next office visit with prescribing clinician: 9/5/2025                         Would you like a call back once the refill request has been completed: [] Yes [] No    If the office needs to give you a call back, can they leave a voicemail: [] Yes [] No    Kendra Talley LPN  03/17/25, 16:24 EDT

## 2025-04-09 DIAGNOSIS — G47.09 OTHER INSOMNIA: ICD-10-CM

## 2025-04-09 RX ORDER — TRAZODONE HYDROCHLORIDE 50 MG/1
50 TABLET ORAL NIGHTLY PRN
Qty: 90 TABLET | Refills: 1 | Status: SHIPPED | OUTPATIENT
Start: 2025-04-09 | End: 2025-04-11 | Stop reason: SDUPTHER

## 2025-04-11 DIAGNOSIS — M47.816 LUMBAR SPONDYLOSIS: ICD-10-CM

## 2025-04-11 DIAGNOSIS — F41.1 GENERALIZED ANXIETY DISORDER: Chronic | ICD-10-CM

## 2025-04-11 DIAGNOSIS — E78.2 MIXED HYPERLIPIDEMIA: ICD-10-CM

## 2025-04-11 DIAGNOSIS — N95.2 POSTMENOPAUSAL ATROPHIC VAGINITIS: ICD-10-CM

## 2025-04-11 DIAGNOSIS — F32.1 CURRENT MODERATE EPISODE OF MAJOR DEPRESSIVE DISORDER, UNSPECIFIED WHETHER RECURRENT: Chronic | ICD-10-CM

## 2025-04-11 DIAGNOSIS — M81.0 OSTEOPOROSIS, UNSPECIFIED OSTEOPOROSIS TYPE, UNSPECIFIED PATHOLOGICAL FRACTURE PRESENCE: ICD-10-CM

## 2025-04-11 DIAGNOSIS — J30.1 SEASONAL ALLERGIC RHINITIS DUE TO POLLEN: ICD-10-CM

## 2025-04-11 DIAGNOSIS — G47.09 OTHER INSOMNIA: ICD-10-CM

## 2025-04-11 DIAGNOSIS — R10.9 ABDOMINAL SPASMS: ICD-10-CM

## 2025-04-11 DIAGNOSIS — N94.10 DYSPAREUNIA IN FEMALE: ICD-10-CM

## 2025-04-11 DIAGNOSIS — M72.2 PLANTAR FASCIITIS, BILATERAL: ICD-10-CM

## 2025-04-11 DIAGNOSIS — K21.00 GASTROESOPHAGEAL REFLUX DISEASE WITH ESOPHAGITIS WITHOUT HEMORRHAGE: Chronic | ICD-10-CM

## 2025-04-14 RX ORDER — CYCLOBENZAPRINE HCL 10 MG
10 TABLET ORAL 3 TIMES DAILY PRN
Qty: 30 TABLET | Refills: 2 | Status: SHIPPED | OUTPATIENT
Start: 2025-04-14

## 2025-04-14 RX ORDER — ESTRADIOL 10 UG/1
1 TABLET, FILM COATED VAGINAL 2 TIMES WEEKLY
Qty: 8 TABLET | Refills: 5 | Status: SHIPPED | OUTPATIENT
Start: 2025-04-14

## 2025-04-14 RX ORDER — ATORVASTATIN CALCIUM 20 MG/1
20 TABLET, FILM COATED ORAL
Qty: 90 TABLET | Refills: 1 | Status: SHIPPED | OUTPATIENT
Start: 2025-04-14

## 2025-04-14 RX ORDER — FLUTICASONE PROPIONATE 50 MCG
1 SPRAY, SUSPENSION (ML) NASAL DAILY
Qty: 48 G | Refills: 3 | Status: SHIPPED | OUTPATIENT
Start: 2025-04-14

## 2025-04-14 RX ORDER — DICYCLOMINE HCL 20 MG
20 TABLET ORAL EVERY 6 HOURS
Qty: 180 TABLET | Refills: 1 | Status: SHIPPED | OUTPATIENT
Start: 2025-04-14

## 2025-04-14 RX ORDER — CETIRIZINE HYDROCHLORIDE 10 MG/1
10 TABLET ORAL
Qty: 30 TABLET | Refills: 5 | Status: SHIPPED | OUTPATIENT
Start: 2025-04-14

## 2025-04-14 RX ORDER — ALENDRONATE SODIUM 70 MG/1
70 TABLET ORAL
Qty: 12 TABLET | Refills: 3 | Status: SHIPPED | OUTPATIENT
Start: 2025-04-14

## 2025-04-14 RX ORDER — MELOXICAM 15 MG/1
TABLET ORAL
Qty: 90 TABLET | Refills: 1 | Status: SHIPPED | OUTPATIENT
Start: 2025-04-14

## 2025-04-14 RX ORDER — TRAZODONE HYDROCHLORIDE 50 MG/1
50 TABLET ORAL NIGHTLY PRN
Qty: 90 TABLET | Refills: 1 | Status: SHIPPED | OUTPATIENT
Start: 2025-04-14

## 2025-04-14 RX ORDER — FAMOTIDINE 40 MG/1
20 TABLET, FILM COATED ORAL DAILY PRN
Qty: 90 TABLET | Refills: 3 | Status: SHIPPED | OUTPATIENT
Start: 2025-04-14

## 2025-04-14 RX ORDER — AMLODIPINE BESYLATE 5 MG/1
5 TABLET ORAL DAILY
Qty: 30 TABLET | Refills: 3 | Status: SHIPPED | OUTPATIENT
Start: 2025-04-14

## 2025-04-14 NOTE — TELEPHONE ENCOUNTER
Rx Refill Note  Requested Prescriptions     Pending Prescriptions Disp Refills    cyclobenzaprine (FLEXERIL) 10 MG tablet 30 tablet 2     Sig: Take 1 tablet by mouth 3 (Three) Times a Day As Needed for Muscle Spasms.    dicyclomine (BENTYL) 20 MG tablet 180 tablet 1     Sig: Take 1 tablet by mouth Every 6 (Six) Hours. PRN    alendronate (FOSAMAX) 70 MG tablet 12 tablet 3     Sig: Take 1 tablet by mouth Every 7 (Seven) Days.    Diclofenac Sodium (VOLTAREN) 1 % gel gel 350 g 1    atorvastatin (LIPITOR) 20 MG tablet 90 tablet 1     Sig: Take 1 tablet by mouth every night at bedtime.    famotidine (PEPCID) 40 MG tablet 90 tablet 3    meloxicam (MOBIC) 15 MG tablet 90 tablet 1     Sig: TAKE ONE TABLET BY MOUTH EVERY DAY WITH FOOD    amLODIPine (NORVASC) 5 MG tablet 30 tablet 3     Sig: Take 1 tablet by mouth Daily.    cetirizine (zyrTEC) 10 MG tablet 30 tablet 5     Sig: Take 1 tablet by mouth every night at bedtime.    estradiol (VAGIFEM) 10 MCG tablet vaginal tablet 8 tablet 5    FLUoxetine (PROzac) 20 MG capsule 90 capsule 1     Sig: Take 3 capsules by mouth Daily.    fluticasone (FLONASE) 50 MCG/ACT nasal spray 48 g 3    traZODone (DESYREL) 50 MG tablet 90 tablet 1     Sig: Take 1 tablet by mouth At Night As Needed for Sleep.      Last office visit with prescribing clinician: 3/4/2025   Last telemedicine visit with prescribing clinician: Visit date not found   Next office visit with prescribing clinician: 9/5/2025                         Would you like a call back once the refill request has been completed: [] Yes [] No    If the office needs to give you a call back, can they leave a voicemail: [] Yes [] No    Laurie Hernandez LPN  04/14/25, 08:12 EDT

## 2025-04-15 ENCOUNTER — OFFICE VISIT (OUTPATIENT)
Dept: INTERNAL MEDICINE | Facility: CLINIC | Age: 65
End: 2025-04-15
Payer: MEDICAID

## 2025-04-15 ENCOUNTER — LAB (OUTPATIENT)
Dept: LAB | Facility: HOSPITAL | Age: 65
End: 2025-04-15
Payer: MEDICAID

## 2025-04-15 VITALS
TEMPERATURE: 98 F | HEART RATE: 90 BPM | WEIGHT: 111 LBS | OXYGEN SATURATION: 99 % | DIASTOLIC BLOOD PRESSURE: 60 MMHG | SYSTOLIC BLOOD PRESSURE: 110 MMHG | BODY MASS INDEX: 23.3 KG/M2 | HEIGHT: 58 IN

## 2025-04-15 DIAGNOSIS — R00.2 PALPITATIONS: Primary | ICD-10-CM

## 2025-04-15 DIAGNOSIS — R07.89 CHEST TIGHTNESS: ICD-10-CM

## 2025-04-15 DIAGNOSIS — R00.2 PALPITATIONS: ICD-10-CM

## 2025-04-15 DIAGNOSIS — F41.1 GENERALIZED ANXIETY DISORDER: Chronic | ICD-10-CM

## 2025-04-15 LAB — D DIMER PPP FEU-MCNC: <0.27 MCGFEU/ML (ref 0–0.63)

## 2025-04-15 PROCEDURE — 3078F DIAST BP <80 MM HG: CPT | Performed by: STUDENT IN AN ORGANIZED HEALTH CARE EDUCATION/TRAINING PROGRAM

## 2025-04-15 PROCEDURE — 1160F RVW MEDS BY RX/DR IN RCRD: CPT | Performed by: STUDENT IN AN ORGANIZED HEALTH CARE EDUCATION/TRAINING PROGRAM

## 2025-04-15 PROCEDURE — 85379 FIBRIN DEGRADATION QUANT: CPT

## 2025-04-15 PROCEDURE — 3074F SYST BP LT 130 MM HG: CPT | Performed by: STUDENT IN AN ORGANIZED HEALTH CARE EDUCATION/TRAINING PROGRAM

## 2025-04-15 PROCEDURE — 1125F AMNT PAIN NOTED PAIN PRSNT: CPT | Performed by: STUDENT IN AN ORGANIZED HEALTH CARE EDUCATION/TRAINING PROGRAM

## 2025-04-15 PROCEDURE — 1159F MED LIST DOCD IN RCRD: CPT | Performed by: STUDENT IN AN ORGANIZED HEALTH CARE EDUCATION/TRAINING PROGRAM

## 2025-04-15 PROCEDURE — 99214 OFFICE O/P EST MOD 30 MIN: CPT | Performed by: STUDENT IN AN ORGANIZED HEALTH CARE EDUCATION/TRAINING PROGRAM

## 2025-04-15 PROCEDURE — 93000 ELECTROCARDIOGRAM COMPLETE: CPT | Performed by: STUDENT IN AN ORGANIZED HEALTH CARE EDUCATION/TRAINING PROGRAM

## 2025-04-15 RX ORDER — BUPROPION HYDROCHLORIDE 300 MG/1
300 TABLET ORAL DAILY
COMMUNITY

## 2025-04-15 RX ORDER — VALACYCLOVIR HYDROCHLORIDE 1 G/1
1000 TABLET, FILM COATED ORAL AS NEEDED
COMMUNITY
Start: 2025-04-02

## 2025-04-15 RX ORDER — HYDROXYZINE HYDROCHLORIDE 25 MG/1
12.5-25 TABLET, FILM COATED ORAL 3 TIMES DAILY PRN
Qty: 60 TABLET | Refills: 1 | Status: SHIPPED | OUTPATIENT
Start: 2025-04-15

## 2025-04-15 NOTE — PROGRESS NOTES
"Chief Complaint  Areli Barnett is a 65 y.o. female presenting for Hypertension.     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      History of Present Illness  Patient is here for acute visit.  She sent a message last week on 4/11/2025.  She had had an episode of shortness of breath, breaking out in a sweat, reported heart rate 147 bpm at the time.  Ambulance was called, they did EKG that was reported normal to her.    She reports over the last 3-4 weeks experiencing daily palpitations, sometimes several times a day, heart rate on average around 120-140.  This can typically last a couple of minutes.  She does feel fatigued.  She also has noticed mild shortness of breath.  She reports worse with activity, no chest pain, but she has  noted some tightness of her anterior lower neck when these episodes are going on.  It appears that exertion also to some degree does cause this lower neck/chest pressure.  No leg swelling or tenderness of the legs.  No history of blood clot like DVT or blood clot to the lungs.  Currently no chest pain.    She also is asking for something to take as needed for her anxiety.  She has not had any panic attacks, but feels anxiety has possibly gotten some worse.    The following portions of the patient's history were reviewed and updated as appropriate: allergies, current medications, past family history, past medical history, past social history, past surgical history, and problem list.    Objective  /60 (BP Location: Right arm, Patient Position: Sitting, Cuff Size: Adult)   Pulse 90   Temp 98 °F (36.7 °C) (Infrared)   Ht 147.3 cm (57.99\")   Wt 50.3 kg (111 lb)   LMP  (LMP Unknown)   SpO2 99%   BMI 23.21 kg/m²     Physical " Exam  Vitals reviewed.   Constitutional:       Appearance: Normal appearance.   HENT:      Head: Normocephalic and atraumatic.      Nose: Nose normal. No congestion.      Mouth/Throat:      Mouth: Mucous membranes are moist.   Eyes:      Extraocular Movements: Extraocular movements intact.      Conjunctiva/sclera: Conjunctivae normal.   Cardiovascular:      Rate and Rhythm: Normal rate and regular rhythm.      Heart sounds: Normal heart sounds. No murmur heard.  Pulmonary:      Effort: Pulmonary effort is normal.      Breath sounds: Normal breath sounds.   Abdominal:      General: There is no distension.      Palpations: Abdomen is soft. There is no mass.      Tenderness: There is no abdominal tenderness.   Musculoskeletal:      Cervical back: Neck supple.      Right lower leg: No edema.      Left lower leg: No edema.      Comments: No tenderness of the calves, no swelling of the calves.   Skin:     General: Skin is warm and dry.   Neurological:      Mental Status: She is alert and oriented to person, place, and time. Mental status is at baseline.   Psychiatric:         Behavior: Behavior normal.         Thought Content: Thought content normal.           ECG 12 Lead    Date/Time: 4/15/2025 10:51 AM  Performed by: Salvatore Townsend MD    Authorized by: Salvatore Townsend MD  Comparison: compared with previous ECG from 6/8/2021  Rhythm: sinus rhythm  Rate: normal  BPM: 82  Conduction: conduction normal  ST Segments: ST segments normal  T Waves: T waves normal    Clinical impression: normal ECG              Assessment/Plan   1. Palpitations  2. Chest tightness  Worsening symptoms over the last few weeks.  She did have a significant episode last week getting clammy, short of breath, having some upper chest tightness/tightness of her lower neck with exertion and recurrent episodes of palpitations.  She did have cardiac workup in 2018.  Given these escalating symptoms I would refer her to cardiology for urgent  evaluation.  I think possibility for PE is low, but I will do a D-dimer today, if positive I recommend CTA.  I have asked patient to get in touch with any worsening symptoms at any point.  Would only do lighter physical activity as tolerated for now.  If any symptoms stop her activity.  - ECG 12 Lead  - Ambulatory Referral to [facility] [place]  - D-dimer, Quantitative; Future    3. Generalized anxiety disorder  Will do trial of hydroxyzine for her anxiety which has gotten somewhat worse.  - hydrOXYzine (ATARAX) 25 MG tablet; Take 0.5-1 tablets by mouth 3 (Three) Times a Day As Needed for Anxiety.  Dispense: 60 tablet; Refill: 1      Return if symptoms worsen or fail to improve, for Next scheduled follow up.    Future Appointments         Provider Department Center    4/15/2025 12:25 PM LAB [facility] AT [facility]    6/27/2025 10:30 AM [facility] DEXA 1 [facility] DEXA     6/27/2025 11:00 AM RENALDO MAMM 1 [facility] MAMMO RENALDO    9/5/2025 9:00 AM Salvatore Townsend MD [facility] [place]              Salvatore Townsend MD  Family Medicine  04/15/2025

## 2025-04-17 ENCOUNTER — OFFICE VISIT (OUTPATIENT)
Dept: CARDIOLOGY | Facility: HOSPITAL | Age: 65
End: 2025-04-17
Payer: MEDICAID

## 2025-04-17 ENCOUNTER — HOSPITAL ENCOUNTER (OUTPATIENT)
Dept: CARDIOLOGY | Facility: HOSPITAL | Age: 65
Discharge: HOME | End: 2025-04-17
Payer: MEDICAID

## 2025-04-17 VITALS
WEIGHT: 107.38 LBS | DIASTOLIC BLOOD PRESSURE: 60 MMHG | HEART RATE: 101 BPM | RESPIRATION RATE: 20 BRPM | SYSTOLIC BLOOD PRESSURE: 136 MMHG | BODY MASS INDEX: 22.54 KG/M2 | OXYGEN SATURATION: 99 % | HEIGHT: 58 IN

## 2025-04-17 DIAGNOSIS — R00.2 PALPITATIONS: ICD-10-CM

## 2025-04-17 DIAGNOSIS — R06.02 SHORTNESS OF BREATH: ICD-10-CM

## 2025-04-17 DIAGNOSIS — R42 DIZZINESS: ICD-10-CM

## 2025-04-17 DIAGNOSIS — R07.89 CHEST TIGHTNESS: Primary | ICD-10-CM

## 2025-04-17 DIAGNOSIS — E78.5 HYPERLIPIDEMIA, UNSPECIFIED HYPERLIPIDEMIA TYPE: ICD-10-CM

## 2025-04-17 PROCEDURE — 93246 EXT ECG>7D<15D RECORDING: CPT

## 2025-04-17 NOTE — PROGRESS NOTES
"[facility], [facility]    Chief Complaint  Palpitations, Chest Pain, and Shortness of Breath    Subjective    History of Present Illness {CC  Problem List  Visit  Diagnosis   Encounters  Notes  Medications  Labs  Result Review Imaging  Media :23}     Areli Barnett presents to [facility] for   History of Present Illness     65-year-old female hyperlipidemia, hypertension, palpitations, GERD, esophageal stricture, H. 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    Patient complaining of increased frequency of palpitations. Worsened over the last few weeks.   Occurring several times per day.  Elevated heart rate 120s to 170s.  Associated with intermittent dyspnea, diaphoresis, fatigue.  Does worse with activity.  No chest pain or pressure, but describes exertional back, neck, throat, and chest tightness. Occassional dizziness.     GERD is stable.     Reports worsening anxiety. No Panic attacks.  Patient currently on Prozac.  Primary care added hydroxyzine as needed.  Has not tried med yet.     Pt reports increased stress with employment.       Reports  acute illness/virus 1 month ago.     EKG 4/15/2025: Sinus rhythm 82 bpm    Followed her I watch regularly.      Father with hx of MI age 39.     Objective     Vital Signs:   Vitals:    04/17/25 0902 04/17/25 0904   BP: 113/55 136/60   BP Location: Left arm Left arm   Patient Position: Standing Sitting   Cuff Size: Small Adult Small Adult   Pulse: 105 101   Resp:  20   SpO2: 99% 99%   Weight:  48.7 kg (107 lb 6 oz)   Height:  147.3 cm (57.99\")     Body mass index is 22.45 kg/m².  Physical Exam  Vitals reviewed.   Constitutional:       General: She is not in acute distress.  Cardiovascular:      Rate and Rhythm: Regular rhythm. Tachycardia present.      Heart sounds: No murmur heard.  Pulmonary: "      Effort: Pulmonary effort is normal.      Breath sounds: Normal breath sounds.   Musculoskeletal:      Right lower leg: No edema.      Left lower leg: No edema.   Skin:     Coloration: Skin is not pale.   Neurological:      Mental Status: She is alert.   Psychiatric:         Mood and Affect: Mood normal.         Behavior: Behavior normal. Behavior is cooperative.              Result Review  Data Reviewed:{ Labs  Result Review  Imaging  Med Tab  Media :23}   2018 treadmill stress test: No ischemia    Echocardiogram 2018: EF 68%, no reported significant valvular disease    D-dimer, Quantitative (04/15/2025 11:35) : normal    Lab Results   Component Value Date    WBC 8.66 03/04/2025    HGB 13.5 03/04/2025    HCT 39.8 03/04/2025    MCV 91.5 03/04/2025     03/04/2025     Lab Results   Component Value Date    TSH 1.610 03/04/2025     Lab Results   Component Value Date    CHOL 206 (H) 03/04/2025    CHOL 148 11/21/2023    CHOL 154 10/25/2021    CHLPL 170 10/25/2022    CHLPL 198 03/31/2021    CHLPL 223 (H) 09/16/2020     Lab Results   Component Value Date    TRIG 55 03/04/2025    TRIG 60 11/21/2023    TRIG 63 10/25/2022     Lab Results   Component Value Date    HDL 83 (H) 03/04/2025    HDL 70 (H) 11/21/2023    HDL 99 (H) 10/25/2022     Lab Results   Component Value Date     (H) 03/04/2025    LDL 66 11/21/2023    LDL 59 10/25/2022     Lab Results   Component Value Date    GLUCOSE 88 03/04/2025    BUN 15 03/04/2025    CREATININE 0.82 03/04/2025     03/04/2025    K 4.3 03/04/2025     03/04/2025    CALCIUM 9.5 03/04/2025    PROTEINTOT 7.1 03/04/2025    ALBUMIN 4.6 03/04/2025    ALT 13 03/04/2025    AST 26 03/04/2025    ALKPHOS 62 03/04/2025    BILITOT 0.4 03/04/2025    GLOB 2.5 03/04/2025    AGRATIO 1.8 03/04/2025    BCR 18.3 03/04/2025    ANIONGAP 10.3 03/04/2025    EGFR 80.5 03/04/2025                       Assessment and Plan {CC Problem List  Visit Diagnosis  ROS  Review (Popup)  Health  Maintenance  Quality  BestPractice  Medications  SmartSets  SnapShot Encounters  Media :23}   1. Chest tightness  Cardiac risk factors:  History of dyslipidemia, hypertension, amily history of premature coronary artery disease (male < 55 yrs, female <66 yrs)    - Stress Test With Myocardial Perfusion One Day; gaited    2. Palpitations    - Holter Monitor - 72 Hour Up To 15 Days; Future    3. dyspnea of exertion    - Stress Test With Myocardial Perfusion One Day; Future  - Adult Transthoracic Echo Complete W/ Cont if Necessary Per Protocol; Future    4. Hyperlipidemia, unspecified hyperlipidemia type  statin    5. Dizziness    - Adult Transthoracic Echo Complete W/ Cont if Necessary Per Protocol; Future          Follow Up {Instructions Charge Capture  Follow-up Communications :23}   Return in about 6 weeks (around 5/29/2025), or if symptoms worsen or fail to improve.    Patient was given instructions and counseling regarding her condition or for health maintenance advice. Please see specific information pulled into the AVS if appropriate.  Patient was instructed to call the [facility] with any questions, concerns, or worsening symptoms.

## 2025-04-17 NOTE — PROGRESS NOTES
[facility] Heart Monitor Documentation    Areli Barnett  1960  [id number]  04/17/25      [] ZIO XT Patch  Model [id number] Prescribed for N/A Days    Serial Number: (N + 9 Digits) N   Apply-By Date on Box:   USPS Tracking Number:   USPS Tracking        [] [employer] MINI PLUS Mobile Cardiac Telemetry  Model BGMINIPLUS Prescribed for N/A Days    Serial Number: (BGM + 7 Digits) BGM  Shipped-By Date on Box:   UPS Tracking Number: [id number]  UPS Tracking      [] [employer] MINI Holter Monitor  Model BGMINIEL Prescribed for 14 Days    Serial Number: (7 Digits) [id number]  Shipped-By Date on Box: [id number]  UPS Tracking Number: [id number]  UPS Tracking        This monitor was applied to the patient's chest and checked for proper functioning.  Ms. Areli Barnett was instructed in the proper use of this monitor.  She was given the opportunity to ask questions and left the office with the device 's instruction manual.    Juany Helton MA, 09:34 EDT, 04/17/25                  [facility] 8.8.2019

## 2025-04-18 ENCOUNTER — PATIENT ROUNDING (BHMG ONLY) (OUTPATIENT)
Dept: CARDIOLOGY | Facility: HOSPITAL | Age: 65
End: 2025-04-18
Payer: MEDICAID

## 2025-04-18 ENCOUNTER — HOSPITAL ENCOUNTER (OUTPATIENT)
Dept: CARDIOLOGY | Facility: HOSPITAL | Age: 65
Discharge: HOME | End: 2025-04-18
Payer: MEDICAID

## 2025-04-18 VITALS — BODY MASS INDEX: 22.54 KG/M2 | HEIGHT: 58 IN | WEIGHT: 107.36 LBS

## 2025-04-18 DIAGNOSIS — R07.89 CHEST TIGHTNESS: ICD-10-CM

## 2025-04-18 DIAGNOSIS — R06.02 SHORTNESS OF BREATH: ICD-10-CM

## 2025-04-18 PROCEDURE — 93017 CV STRESS TEST TRACING ONLY: CPT

## 2025-04-18 PROCEDURE — 78452 HT MUSCLE IMAGE SPECT MULT: CPT

## 2025-04-18 PROCEDURE — 25010000002 REGADENOSON 0.4 MG/5ML SOLUTION: Performed by: NURSE PRACTITIONER

## 2025-04-18 PROCEDURE — 34310000005 TECHNETIUM SESTAMIBI: Performed by: NURSE PRACTITIONER

## 2025-04-18 PROCEDURE — A9500 TC99M SESTAMIBI: HCPCS | Performed by: NURSE PRACTITIONER

## 2025-04-18 RX ORDER — REGADENOSON 0.08 MG/ML
0.4 INJECTION, SOLUTION INTRAVENOUS ONCE
Status: COMPLETED | OUTPATIENT
Start: 2025-04-18 | End: 2025-04-18

## 2025-04-18 RX ADMIN — TECHNETIUM TC 99M SESTAMIBI 1 DOSE: 1 INJECTION INTRAVENOUS at 08:30

## 2025-04-18 RX ADMIN — REGADENOSON 0.4 MG: 0.08 INJECTION, SOLUTION INTRAVENOUS at 09:45

## 2025-04-18 RX ADMIN — TECHNETIUM TC 99M SESTAMIBI 1 DOSE: 1 INJECTION INTRAVENOUS at 09:45

## 2025-04-18 NOTE — PROGRESS NOTES
April 18, 2025    Hello, may I speak with Arelibautista Barnett?    My name is Soha      I am  with [facility]  [address]  [facility] [address] [phone].    Before we get started may I verify your date of birth? 1960    I am calling to officially welcome you to our practice and ask about your recent visit. Is this a good time to talk? yes    Tell me about your visit with us. What things went well?  All flowed fine.     How was your experience with our registration process and staff? Real good.     Did we protect your privacy? Yes.      We're always looking for ways to make our patients' experiences even better. Do you have recommendations on ways we may improve?  no    Overall were you satisfied with your first visit to our practice? yes       I appreciate you taking the time to speak with me today. Is there anything else I can do for you? no      Thank you, and have a great day.

## 2025-04-20 LAB
BH CV REST NUCLEAR ISOTOPE DOSE: 9.9 MCI
BH CV STRESS BP STAGE 2: NORMAL
BH CV STRESS BP STAGE 4: NORMAL
BH CV STRESS COMMENTS STAGE 1: NORMAL
BH CV STRESS DOSE REGADENOSON STAGE 1: 0.4
BH CV STRESS DURATION MIN STAGE 1: 1
BH CV STRESS DURATION MIN STAGE 2: 1
BH CV STRESS DURATION MIN STAGE 3: 1
BH CV STRESS DURATION MIN STAGE 4: 1
BH CV STRESS DURATION SEC STAGE 1: 0
BH CV STRESS DURATION SEC STAGE 2: 0
BH CV STRESS DURATION SEC STAGE 3: 0
BH CV STRESS DURATION SEC STAGE 4: 0
BH CV STRESS HR STAGE 1: 117
BH CV STRESS HR STAGE 2: 120
BH CV STRESS HR STAGE 3: 114
BH CV STRESS HR STAGE 4: 109
BH CV STRESS NUCLEAR ISOTOPE DOSE: 32.7 MCI
BH CV STRESS O2 STAGE 1: 99
BH CV STRESS O2 STAGE 2: 99
BH CV STRESS O2 STAGE 3: 99
BH CV STRESS O2 STAGE 4: 98
BH CV STRESS PROTOCOL 1: NORMAL
BH CV STRESS RECOVERY BP: NORMAL MMHG
BH CV STRESS RECOVERY HR: 102 BPM
BH CV STRESS RECOVERY O2: 99 %
BH CV STRESS STAGE 1: 1
BH CV STRESS STAGE 2: 2
BH CV STRESS STAGE 3: 3
BH CV STRESS STAGE 4: 4
MAXIMAL PREDICTED HEART RATE: 157 BPM
PERCENT MAX PREDICTED HR: 78.34 %
SPECT HRT GATED+EF W RNC IV: 73 %
STRESS BASELINE BP: NORMAL MMHG
STRESS BASELINE HR: 88 BPM
STRESS O2 SAT REST: 98 %
STRESS PERCENT HR: 92 %
STRESS POST ESTIMATED WORKLOAD: 1 METS
STRESS POST EXERCISE DUR MIN: 4 MIN
STRESS POST EXERCISE DUR SEC: 0 SEC
STRESS POST O2 SAT PEAK: 99 %
STRESS POST PEAK BP: NORMAL MMHG
STRESS POST PEAK HR: 123 BPM
STRESS TARGET HR: 133 BPM

## 2025-04-24 ENCOUNTER — TELEPHONE (OUTPATIENT)
Dept: CARDIOLOGY | Facility: HOSPITAL | Age: 65
End: 2025-04-24
Payer: MEDICAID

## 2025-04-24 DIAGNOSIS — R07.89 CHEST TIGHTNESS: Primary | ICD-10-CM

## 2025-04-24 DIAGNOSIS — R06.02 SHORTNESS OF BREATH: ICD-10-CM

## 2025-04-24 DIAGNOSIS — R42 DIZZINESS: ICD-10-CM

## 2025-04-24 DIAGNOSIS — E78.5 HYPERLIPIDEMIA, UNSPECIFIED HYPERLIPIDEMIA TYPE: ICD-10-CM

## 2025-04-24 RX ORDER — METOPROLOL TARTRATE 100 MG/1
TABLET ORAL
Qty: 2 TABLET | Refills: 0 | Status: SHIPPED | OUTPATIENT
Start: 2025-04-24

## 2025-04-24 NOTE — TELEPHONE ENCOUNTER
Reviewed stress test with patient that was nondiagnostic.  Will order coronary CTA for further evaluation.

## 2025-04-25 ENCOUNTER — HOSPITAL ENCOUNTER (OUTPATIENT)
Dept: CARDIOLOGY | Facility: HOSPITAL | Age: 65
Discharge: HOME | End: 2025-04-25
Payer: MEDICAID

## 2025-04-25 VITALS
WEIGHT: 107 LBS | HEIGHT: 58 IN | SYSTOLIC BLOOD PRESSURE: 113 MMHG | BODY MASS INDEX: 22.46 KG/M2 | DIASTOLIC BLOOD PRESSURE: 62 MMHG

## 2025-04-25 DIAGNOSIS — R42 DIZZINESS: ICD-10-CM

## 2025-04-25 DIAGNOSIS — R06.02 SHORTNESS OF BREATH: ICD-10-CM

## 2025-04-25 LAB
AORTIC DIMENSIONLESS INDEX: 0.81 (DI)
AV MEAN PRESS GRAD SYS DOP V1V2: 3 MMHG
AV VMAX SYS DOP: 116.5 CM/SEC
BH CV ECHO MEAS - AO MAX PG: 5.5 MMHG
BH CV ECHO MEAS - AO ROOT DIAM: 2.8 CM
BH CV ECHO MEAS - AO V2 VTI: 22.1 CM
BH CV ECHO MEAS - AVA(I,D): 2.31 CM2
BH CV ECHO MEAS - EDV(CUBED): 50.7 ML
BH CV ECHO MEAS - EDV(MOD-SP2): 79 ML
BH CV ECHO MEAS - EDV(MOD-SP4): 61.8 ML
BH CV ECHO MEAS - EF(MOD-SP2): 68.4 %
BH CV ECHO MEAS - EF(MOD-SP4): 55.7 %
BH CV ECHO MEAS - ESV(CUBED): 17.6 ML
BH CV ECHO MEAS - ESV(MOD-SP2): 25 ML
BH CV ECHO MEAS - ESV(MOD-SP4): 27.4 ML
BH CV ECHO MEAS - FS: 29.7 %
BH CV ECHO MEAS - IVS/LVPW: 1.67 CM
BH CV ECHO MEAS - IVSD: 1 CM
BH CV ECHO MEAS - LA DIMENSION: 2.7 CM
BH CV ECHO MEAS - LAT PEAK E' VEL: 11.4 CM/SEC
BH CV ECHO MEAS - LV DIASTOLIC VOL/BSA (35-75): 43.7 CM2
BH CV ECHO MEAS - LV MASS(C)D: 82.3 GRAMS
BH CV ECHO MEAS - LV MAX PG: 3.7 MMHG
BH CV ECHO MEAS - LV MEAN PG: 2 MMHG
BH CV ECHO MEAS - LV SYSTOLIC VOL/BSA (12-30): 19.4 CM2
BH CV ECHO MEAS - LV V1 MAX: 96.7 CM/SEC
BH CV ECHO MEAS - LV V1 VTI: 18 CM
BH CV ECHO MEAS - LVIDD: 3.7 CM
BH CV ECHO MEAS - LVIDS: 2.6 CM
BH CV ECHO MEAS - LVOT AREA: 2.8 CM2
BH CV ECHO MEAS - LVOT DIAM: 1.9 CM
BH CV ECHO MEAS - LVPWD: 0.6 CM
BH CV ECHO MEAS - MED PEAK E' VEL: 8.9 CM/SEC
BH CV ECHO MEAS - MV A MAX VEL: 68.7 CM/SEC
BH CV ECHO MEAS - MV DEC TIME: 0.19 SEC
BH CV ECHO MEAS - MV E MAX VEL: 85.9 CM/SEC
BH CV ECHO MEAS - MV E/A: 1.25
BH CV ECHO MEAS - PA ACC TIME: 0.12 SEC
BH CV ECHO MEAS - PA V2 MAX: 120 CM/SEC
BH CV ECHO MEAS - SV(LVOT): 51 ML
BH CV ECHO MEAS - SV(MOD-SP2): 54 ML
BH CV ECHO MEAS - SV(MOD-SP4): 34.4 ML
BH CV ECHO MEAS - SVI(LVOT): 36.1 ML/M2
BH CV ECHO MEAS - SVI(MOD-SP2): 38.2 ML/M2
BH CV ECHO MEAS - SVI(MOD-SP4): 24.3 ML/M2
BH CV ECHO MEAS - TAPSE (>1.6): 1.7 CM
BH CV ECHO MEASUREMENTS AVERAGE E/E' RATIO: 8.46
BH CV VAS BP LEFT ARM: NORMAL MMHG
BH CV XLRA - RV BASE: 3 CM
BH CV XLRA - RV LENGTH: 5.9 CM
BH CV XLRA - RV MID: 2.5 CM
BH CV XLRA - TDI S': 13.3 CM/SEC
IVRT: 63 MS
LEFT ATRIUM VOLUME INDEX: 20.6 ML/M2
LV EF BIPLANE MOD: 62.7 %

## 2025-04-25 PROCEDURE — 93306 TTE W/DOPPLER COMPLETE: CPT

## 2025-05-06 ENCOUNTER — TELEPHONE (OUTPATIENT)
Facility: HOSPITAL | Age: 65
End: 2025-05-06
Payer: MEDICAID

## 2025-05-06 NOTE — TELEPHONE ENCOUNTER
Attempted to contact patient as pre-procedure phone call prior to planned CT angiogram coronary planned for 5/7/25. Left voicemail message reminder with arrival time between 15-30 minutes prior to main registration, nothing to eat or drink 2 hours prior to arrival time, no caffeine after midnight, okay to take medications as per normal routine on Monday unless taking a stimulant,  is recommended, and if have any questions may contact outpatient prep and recovery at [phone]

## 2025-05-07 ENCOUNTER — HOSPITAL ENCOUNTER (OUTPATIENT)
Facility: HOSPITAL | Age: 65
Discharge: HOME | End: 2025-05-07
Payer: MEDICAID

## 2025-05-07 VITALS
RESPIRATION RATE: 12 BRPM | BODY MASS INDEX: 22.16 KG/M2 | DIASTOLIC BLOOD PRESSURE: 62 MMHG | WEIGHT: 109.9 LBS | HEIGHT: 59 IN | OXYGEN SATURATION: 93 % | TEMPERATURE: 97.7 F | HEART RATE: 67 BPM | SYSTOLIC BLOOD PRESSURE: 105 MMHG

## 2025-05-07 DIAGNOSIS — R06.02 SHORTNESS OF BREATH: ICD-10-CM

## 2025-05-07 DIAGNOSIS — E78.5 HYPERLIPIDEMIA, UNSPECIFIED HYPERLIPIDEMIA TYPE: ICD-10-CM

## 2025-05-07 DIAGNOSIS — R42 DIZZINESS: ICD-10-CM

## 2025-05-07 DIAGNOSIS — R07.89 CHEST TIGHTNESS: ICD-10-CM

## 2025-05-07 PROCEDURE — 25510000001 IOPAMIDOL PER 1 ML: Performed by: NURSE PRACTITIONER

## 2025-05-07 PROCEDURE — 75574 CT ANGIO HRT W/3D IMAGE: CPT

## 2025-05-07 PROCEDURE — 75574 CT ANGIO HRT W/3D IMAGE: CPT | Performed by: INTERNAL MEDICINE

## 2025-05-07 RX ORDER — NITROGLYCERIN 0.4 MG/1
0.4 TABLET SUBLINGUAL
Status: COMPLETED | OUTPATIENT
Start: 2025-05-07 | End: 2025-05-07

## 2025-05-07 RX ORDER — METOPROLOL TARTRATE 25 MG/1
50 TABLET, FILM COATED ORAL ONCE
Status: COMPLETED | OUTPATIENT
Start: 2025-05-07 | End: 2025-05-07

## 2025-05-07 RX ORDER — METOPROLOL TARTRATE 100 MG/1
100 TABLET ORAL ONCE
Status: COMPLETED | OUTPATIENT
Start: 2025-05-07 | End: 2025-05-07

## 2025-05-07 RX ORDER — METOPROLOL TARTRATE 1 MG/ML
5 INJECTION, SOLUTION INTRAVENOUS
Status: DISCONTINUED | OUTPATIENT
Start: 2025-05-07 | End: 2025-05-08 | Stop reason: HOSPADM

## 2025-05-07 RX ORDER — METOPROLOL TARTRATE 100 MG/1
200 TABLET ORAL ONCE
Status: COMPLETED | OUTPATIENT
Start: 2025-05-07 | End: 2025-05-07

## 2025-05-07 RX ORDER — METOPROLOL TARTRATE 25 MG/1
50 TABLET, FILM COATED ORAL
Status: DISCONTINUED | OUTPATIENT
Start: 2025-05-07 | End: 2025-05-08 | Stop reason: HOSPADM

## 2025-05-07 RX ORDER — NITROGLYCERIN 0.4 MG/1
0.8 TABLET SUBLINGUAL
Status: COMPLETED | OUTPATIENT
Start: 2025-05-07 | End: 2025-05-07

## 2025-05-07 RX ORDER — IOPAMIDOL 755 MG/ML
100 INJECTION, SOLUTION INTRAVASCULAR
Status: COMPLETED | OUTPATIENT
Start: 2025-05-07 | End: 2025-05-07

## 2025-05-07 RX ORDER — IVABRADINE 5 MG/1
15 TABLET, FILM COATED ORAL ONCE
Status: COMPLETED | OUTPATIENT
Start: 2025-05-07 | End: 2025-05-07

## 2025-05-07 RX ADMIN — METOPROLOL TARTRATE 150 MG: 100 TABLET, FILM COATED ORAL at 14:41

## 2025-05-07 RX ADMIN — IOPAMIDOL 65 ML: 755 INJECTION, SOLUTION INTRAVENOUS at 15:58

## 2025-05-07 RX ADMIN — NITROGLYCERIN 0.8 MG: 0.4 TABLET SUBLINGUAL at 15:42

## 2025-05-07 RX ADMIN — IVABRADINE 15 MG: 5 TABLET, FILM COATED ORAL at 13:22

## 2025-05-09 ENCOUNTER — TELEPHONE (OUTPATIENT)
Dept: CARDIOLOGY | Facility: HOSPITAL | Age: 65
End: 2025-05-09
Payer: MEDICAID

## 2025-05-09 DIAGNOSIS — R93.89 ABNORMAL FINDING ON CT SCAN: Primary | ICD-10-CM

## 2025-05-09 DIAGNOSIS — F32.1 CURRENT MODERATE EPISODE OF MAJOR DEPRESSIVE DISORDER, UNSPECIFIED WHETHER RECURRENT: Chronic | ICD-10-CM

## 2025-05-09 DIAGNOSIS — F41.1 GENERALIZED ANXIETY DISORDER: Chronic | ICD-10-CM

## 2025-05-09 NOTE — TELEPHONE ENCOUNTER
Reviewed coronary CTA not on cardiology piece with patient. .  Benign appearing fluid density lesion in the anterior mediastinum which could reflect a pericardial cyst, thymic cyst or potential thymic neoplasm.  Patient to be referred to CT surgery non emergently for ongoing evaluation.  Small low-density pericardial effusion noted and mild hypodense soft plaque in the thoracic aorta..  Cardiology interpretation of coronary cta has not been finalized yet.  Will discuss those results with patient when results become available.

## 2025-05-21 ENCOUNTER — OFFICE VISIT (OUTPATIENT)
Dept: PSYCHIATRY | Facility: CLINIC | Age: 65
End: 2025-05-21
Payer: MEDICAID

## 2025-05-21 VITALS
SYSTOLIC BLOOD PRESSURE: 120 MMHG | HEIGHT: 59 IN | HEART RATE: 85 BPM | DIASTOLIC BLOOD PRESSURE: 84 MMHG | WEIGHT: 110.4 LBS | BODY MASS INDEX: 22.26 KG/M2 | OXYGEN SATURATION: 98 %

## 2025-05-21 DIAGNOSIS — F32.1 CURRENT MODERATE EPISODE OF MAJOR DEPRESSIVE DISORDER, UNSPECIFIED WHETHER RECURRENT: Primary | ICD-10-CM

## 2025-05-21 DIAGNOSIS — F41.1 GENERALIZED ANXIETY DISORDER: ICD-10-CM

## 2025-05-21 RX ORDER — ALENDRONATE SODIUM 70 MG/1
1 TABLET ORAL WEEKLY
COMMUNITY
Start: 2025-05-13

## 2025-05-21 NOTE — PROGRESS NOTES
"     New Patient Office Visit      Patient Name: Areli Barnett  : 1960   MRN: [id number]     Referring Provider: Salvatore Townsend MD    Chief Complaint:      ICD-10-CM ICD-9-CM   1. Current moderate episode of major depressive disorder, unspecified whether recurrent  F32.1 296.22   2. Generalized anxiety disorder  F41.1 300.02        History of Present Illness:   Areli Barnett is a 65 y.o. female who is here today for initial assessment for medication management.  History of Present Illness  The patient presents for evaluation of depression and anxiety.    She has a history of clinical depression, which she attributes to her current state of anxiety. She reports experiencing fatigue after climbing 10 steps, a symptom that has persisted for several months. She has been hospitalized for depression at the age of 17 due to familial discord but reports no suicidal ideation or hallucinations. She also reports no manic episodes. She has lost several people in her life but states that she does not consider these losses to be significant. She has experienced minor traumas in her youth, including hitchhiking at the age of 17 or 18. She had a fear of her father and a strained relationship with her mother during her childhood. She has 2 twin sisters and a  brother. She did not perform well academically and dropped out of school in the 10th grade. She has been in a relationship with her boyfriend since , but he is often absent due to work commitments. She has a daughter with whom she is not close and a son who  from alcoholism at the age of 35, approximately 8 years ago.   She has been on Prozac 60 mg since her second marriage and Wellbutrin 150 mg. She takes hydroxyzine every other day, which she finds helpful. She first sought help for mood and anxiety issues when her daughter was 10 years old. She states that she experiences depressive episodes where she \"struggles to function but forces myself to " "get up\". She enjoys watching TV but feels guilty about neglecting other responsibilities. [place]. She states that has experienced periods of risky behavior, such as pulling out in front of traffic, driving erratically but this has not occurred recently. She is currently not in therapy and is unsure if she is interested. She reports no recent weight loss or gain but admits to overeating when inactive.      Her sleep pattern is generally good, aided by trazodone, although she woke up last night. She sleeps for 8 hours except for last night. She takes trazodone at night.    She is currently on Wellbutrin 150 mg and has been off it for a couple of years but resumed it a few months ago. She reports no recent suicidal ideation but admits to having such thoughts when she had sciatica about 2 years ago.  She reports feeling lethargic and out of shape, leading to an episode where she experienced difficulty breathing and excessive sweating, necessitating an ambulance call. However, her blood pressure was normal upon the arrival of the paramedics. She has a history of rapid heart rate but is not currently on any medication for this condition. She was previously on metoprolol 100 mg.  Social History:  - Dropped out of school in the 10th grade  - In a relationship since   - Not close with her daughter  - Son  from alcoholism  - Lives with boyfriend and several pets  - Previously worked for the elderly and at [employer]    Psychiatric History:  - Hospitalized for depression at age 17  - On Prozac 60 mg since second marriage  - On Wellbutrin 150 mg  - Takes hydroxyzine every other day  - First sought help for mood and anxiety issues when daughter was 10 years old    Substance Use:  - Uses marijuana, smoking 2 joints daily for the past 2 years  - Used to abuse alcohol, last consumed a week ago  - Started drinking whiskey in  after leaving   - Does not smoke " "cigarettes    Pertinent Negatives:  - No history of concussion or brain injury  - No history of seizures  - No thyroid issues  - No recent suicidal ideation  - No hallucinations  - No manic episodes    PAST SURGICAL HISTORY:  - Breast implants  - Abdominoplasty    SOCIAL HISTORY  The patient smokes marijuana, approximately 2 joints a day, and has been doing so for 2 years. The patient used to abuse alcohol but has not consumed it in the past week, with the last instance of heavy drinking being four or five months ago.     FAMILY HISTORY  The patient's brother hung himself and had a history of meth and heroin use. The patient's sister has unspecified psychiatric issues. The patient's son  of alcoholism at the age of 35, approximately 8 years ago.      Subjective     Past Medical History:   Past Medical History:   Diagnosis Date    ADHD (attention deficit hyperactivity disorder)     Anemia     Arthritis     Body piercing     EARS    Depression     Dysphagia     REPORTS SOMETIMES SHE HAS PAIN WHEN SWALLOWING FOOD    Elevated cholesterol     Elevated LFTs     Endometriosis     Epigastric pain     Exposure to TB     REPORTS MANY YEARS AGO. REPORTS TB TESTING HAS ALWAYS BEEN NEGATIVE.     Family history of breast cancer 10/06/2021    2021: Genetic testing neg for pathogenic mutations in BRCA1/2 and 34 additional genes included on the CancerNext panel. Lifetime breast cancer risk is estimated to be up to 8.5%     GERD (gastroesophageal reflux disease)     History of bronchitis 2019    History of chest pain     REPORTS FALL 2018 AND THAT SHE HAD TESTING THAT WAS WNL'S. REPORTS SHE IS NOW BEING CHECKED FOR GI.     History of exercise stress test     2018 - REPORTS WAS TOLD ALL WNL'S     History of fracture     TOE    Hyperlipidemia     Latex allergy     Low back pain     Murmur     REPORTS \"A SLIGHT MURMUR\"    Osteoporosis     Plantar fasciitis     Seasonal allergies     Vertigo     Wears glasses     PRN       Past " Surgical History:   Past Surgical History:   Procedure Laterality Date    ABDOMINOPLASTY  2003    AUGMENTATION MAMMAPLASTY Bilateral     COLONOSCOPY  02/02/2016    COSMETIC SURGERY      ENDOSCOPY N/A 02/12/2019    Procedure: ESOPHAGOGASTRODUODENOSCOPY with cold biopsy and esophageal dilation;  Surgeon: Brenden Steward MD;  Location: [facility] ENDOSCOPY;  Service: Gastroenterology    ENDOSCOPY N/A 06/11/2019    Procedure: ESOPHAGOGASTRODUODENOSCOPY W/ COLD FORCEP BIOPSIES;  Surgeon: Brenden Steward MD;  Location: [facility] ENDOSCOPY;  Service: Gastroenterology    EPIDURAL      UPPER GASTROINTESTINAL ENDOSCOPY  02/12/2019    WISDOM TOOTH EXTRACTION         Family History:   Family History   Problem Relation Age of Onset    Lung cancer Mother     Cancer Mother     Heart attack Father     Crohn's disease Sister     Breast cancer Sister     Cancer Sister     Stomach cancer Paternal Grandfather     Alcohol abuse Paternal Grandfather     Breast cancer Maternal Aunt     Colon cancer Neg Hx     Cirrhosis Neg Hx     Liver disease Neg Hx     Liver cancer Neg Hx     Ulcerative colitis Neg Hx     Esophageal cancer Neg Hx        Family Psychiatric History:  Brother-JOHN completed suicide  Sister-unknown psychiatric diagnoses  Son-alcoholism    Screening Scores:   PHQ-9 : 12  FREDERIC-7 : 9    Therapy: Not currently in therapy, not interested in starting at this time    Psychiatric History:   Previous medications tried: Patient unable to state which medications she has been on in the past  Inpatient admissions: Admission as a 17-year-old  History of suicide/self-harm attempts: She reports no recent suicidal ideation but admits to having such thoughts when she had sciatica about 2 years ago.  Denies any SIB  Family history of suicide or attempts: Brother-completed suicide by hanging  Trauma/Abuse History: She has experienced minor traumas in her youth, including hitchhiking at the age of 17 or 18. She had a fear of her father and a strained  relationship with her mother during her childhood. She has 2 twin sisters and a  brother. She did not perform well academically and dropped out of school in the 10th grade. She has been in a relationship with her boyfriend since , but he is often absent due to work commitments. She has a daughter with whom she is not close and a son who  from alcoholism at the age of 35, approximately 8 years ago.   Developmental History: Grew up in [place], has complicated relationships with family.  Currently residing with boyfriend, unemployed    RISK ASSESSMENT:  Does patient currently have intent, plan, ideation for suicide?  Patient denies  Access to firearms or weapons: Patient denied  History of homicidal ideation: Patient denies  Risk Taking/Impulsive Behavior (current or past): Patient endorses past history of describe: Reckless driving Adrenalin seeking activities  Protective factors: Future orientation    Social History:  Highest level of education obtained: 12th grade  Living situation:  She lives with her boyfriend and has several pets.   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  Leisure and Recreation: Staying at home  Support system: Boyfriend  Illicit substance use: She has been using marijuana for the past 2 years, smoking 2 joints daily.  Alcohol use: She used to abuse alcohol but has not consumed it in the past few weeks. She started drinking whiskey in  after leaving her .   Tobacco use: She denies any history of tobacco use    Legal History:   No legal issues.     Medications:     Current Outpatient Medications:     albuterol sulfate  (90 Base) MCG/ACT inhaler, Inhale 2 puffs Every 4 (Four) Hours As Needed for Wheezing or Shortness of Air., Disp: 18 g, Rfl: 0    alendronate (FOSAMAX) 70 MG tablet, Take 1 tablet by mouth 1 (One) Time Per Week., Disp: , Rfl:      atorvastatin (LIPITOR) 20 MG tablet, Take 1 tablet by mouth every night at bedtime., Disp: 90 tablet, Rfl: 1    cetirizine (zyrTEC) 10 MG tablet, Take 1 tablet by mouth every night at bedtime., Disp: 30 tablet, Rfl: 5    cyclobenzaprine (FLEXERIL) 10 MG tablet, Take 1 tablet by mouth 3 (Three) Times a Day As Needed for Muscle Spasms., Disp: 30 tablet, Rfl: 2    Diclofenac Sodium (VOLTAREN) 1 % gel gel, Apply  topically to the appropriate area as directed 4 (Four) Times a Day As Needed (For pain). 2-4 g dose x4 PRN, Disp: 350 g, Rfl: 1    dicyclomine (BENTYL) 20 MG tablet, Take 1 tablet by mouth Every 6 (Six) Hours. PRN, Disp: 180 tablet, Rfl: 1    estradiol (VAGIFEM) 10 MCG tablet vaginal tablet, Insert 1 tablet into the vagina 2 (Two) Times a Week., Disp: 8 tablet, Rfl: 5    famotidine (PEPCID) 40 MG tablet, Take 0.5 tablets by mouth Daily As Needed for Heartburn., Disp: 90 tablet, Rfl: 3    FLUoxetine (PROzac) 20 MG capsule, TAKE THREE CAPSULES BY MOUTH EVERY DAY, Disp: 90 capsule, Rfl: 1    fluticasone (FLONASE) 50 MCG/ACT nasal spray, Administer 1 spray into the nostril(s) as directed by provider Daily. In each nostril, Disp: 48 g, Rfl: 3    hydrOXYzine (ATARAX) 25 MG tablet, Take 0.5-1 tablets by mouth 3 (Three) Times a Day As Needed for Anxiety., Disp: 60 tablet, Rfl: 1    meloxicam (MOBIC) 15 MG tablet, TAKE ONE TABLET BY MOUTH EVERY DAY WITH FOOD, Disp: 90 tablet, Rfl: 1    ondansetron ODT (ZOFRAN-ODT) 4 MG disintegrating tablet, Place 1 tablet on the tongue Every 8 (Eight) Hours As Needed for Nausea or Vomiting., Disp: 30 tablet, Rfl: 1    traZODone (DESYREL) 50 MG tablet, Take 1 tablet by mouth At Night As Needed for Sleep., Disp: 90 tablet, Rfl: 1    tretinoin (Retin-A) 0.05 % cream, Apply  topically to the appropriate area as directed At Night As Needed (acne)., Disp: 20 g, Rfl: 2    Vortioxetine HBr (TRINTELLIX) 5 MG tablet tablet, Take 1 tablet by mouth Daily With Breakfast., Disp: 30 tablet, Rfl:  "1    Medication Considerations:  CONNOR/PDMP reviewed and appropriate in chart.     Allergies:   Allergies   Allergen Reactions    Latex Rash    Penicillins Rash       Objective     Physical Exam:  Vital Signs:   Vitals:    05/21/25 1229   BP: 120/84   Pulse: 85   SpO2: 98%   Weight: 50.1 kg (110 lb 6.4 oz)   Height: 149.9 cm (59\")     Body mass index is 22.3 kg/m².     Mental Status Exam:   MENTAL STATUS EXAM   General Appearance:  Cleanly groomed and dressed  Eye Contact:  Fair  Attitude:  Cooperative  Motor Activity:  Normal gait, posture and fidgety  Muscle Strength:  Normal  Speech:  Normal rate, tone, volume and rambling  Language:  Spontaneous  Mood and affect:  Anxious and irritable  Hopelessness:  5  Loneliness: 5  Thought Process:  Logical and goal-directed  Associations/ Thought Content:  No delusions  Hallucinations:  None  Suicidal Ideations:  Not present  Homicidal Ideation:  Not present  Sensorium:  Alert and clear  Orientation:  Person, place, time and situation  Immediate Recall, Recent, and Remote Memory:  Intact  Attention Span/ Concentration:  Good  Fund of Knowledge:  Appropriate for age and educational level  Intellectual Functioning:  Average range  Insight:  Good  Judgement:  Good  Reliability:  Fair  Impulse Control:  Good       Assessment / Plan      Visit Diagnosis/Orders Placed This Visit:  Diagnoses and all orders for this visit:    1. Current moderate episode of major depressive disorder, unspecified whether recurrent (Primary)  -     Vortioxetine HBr (TRINTELLIX) 5 MG tablet tablet; Take 1 tablet by mouth Daily With Breakfast.  Dispense: 30 tablet; Refill: 1    2. Generalized anxiety disorder       Assessment & Plan  Problems:  - Depression  - Anxiety  - Insomnia    Content of Therapy:  During the session, the patient discussed her history of clinical depression and anxiety, including her current medication regimen. She expressed frustration with irritability and lack of patience, " particularly when learning new tasks. The patient also shared her experiences with depressive episodes, characterized by lethargy and difficulty meeting obligations. Additionally, she reported physical symptoms of anxiety such as shakiness and heart racing. Sleep issues were addressed, noting that trazodone has been effective.    Clinical Impression:  The patient presents with a history of clinical depression and anxiety. She has been on Prozac 60 mg for an extended period and recently restarted Wellbutrin 150 mg with a previous supply that she had, (she has since stopped) due to increased irritability and difficulty with patience. Her depressive episodes have been less severe since starting Prozac, but she continues to experience lethargy and difficulty meeting obligations. Anxiety symptoms, including physical manifestations, are present but managed with hydroxyzine. Sleep is reported as good with trazodone use.    Therapeutic Intervention:  Cognitive-behavioral strategies were utilized to reframe thoughts and explore feelings related to irritability and patience. Psychoeducation on medication options was provided, including a discussion on Trintellix as a potential alternative to Wellbutrin. Mindfulness exercises were recommended to manage anxiety symptoms.    Plan:  -Discontinue Wellbutrin (patient recently stopped on her own)  - Initiate Trintellix 5 mg daily, with potential to increase to 10 mg after a few weeks if tolerated.  - Continue hydroxyzine as needed for anxiety.  - Continue trazodone at night for sleep.  - Continue Prozac 60 mg as prescribed.  (Consider decreasing at follow-up)  - Contact the office if persistent nausea or other intolerable side effects occur.    Follow-up:  - Follow-up appointment scheduled in 4 weeks to assess the response to Trintellix and overall symptom management.    Notes & Risk Factors:  - History of clinical depression and anxiety.  - No current suicidal ideation or severe  depressive episodes.  - Protective factors include medication management and support from a boyfriend.    Functional Status: Moderate impairment     Prognosis: Fair with Ongoing Treatment     Impression/Formulation:  Patient appeared alert and oriented.  Patient is voluntarily requesting to begin psychiatric medication management at [facility].  Patient is receptive to assistance with maintaining a stable lifestyle.  Patient presents with history of     ICD-10-CM ICD-9-CM   1. Current moderate episode of major depressive disorder, unspecified whether recurrent  F32.1 296.22   2. Generalized anxiety disorder  F41.1 300.02   .     Care/Treatment Plan:   Initial visit with patient. No Care Plan or Treatment Plan initiated or created at this visit. However, we have discussed a temporary plan.   -Start Trintellix 5 mg, 1 tablet daily for mood  - Continue fluoxetine 60 mg daily for anxiety  Patient will continue supportive psychotherapy efforts and medications as indicated. Clinic will obtain release of information for current treatment team for continuity of care as needed. Patient will contact this office, call 911 or present to the nearest emergency room should suicidal or homicidal ideations occur. Discussed medication options and treatment plan of prescribed medication(s) as well as the risks, benefits, and potential side effects. Patient acknowledged and verbally consented to continue with current treatment plan and was educated on the importance of compliance with treatment and follow-up appointments.     Follow Up:   Return in about 4 weeks (around 6/18/2025).    Patient or patient representative verbalized consent for the use of Ambient Listening during the visit with  JAI Bledsoe for chart documentation. 5/28/2025  11:29 EDT    JAI Steiner, [facility]

## 2025-05-29 ENCOUNTER — OFFICE VISIT (OUTPATIENT)
Dept: CARDIOLOGY | Facility: HOSPITAL | Age: 65
End: 2025-05-29
Payer: MEDICAID

## 2025-05-29 VITALS
OXYGEN SATURATION: 99 % | HEART RATE: 83 BPM | WEIGHT: 108 LBS | HEIGHT: 59 IN | BODY MASS INDEX: 21.77 KG/M2 | DIASTOLIC BLOOD PRESSURE: 61 MMHG | SYSTOLIC BLOOD PRESSURE: 122 MMHG

## 2025-05-29 DIAGNOSIS — I25.10 CORONARY ARTERY DISEASE INVOLVING NATIVE CORONARY ARTERY OF NATIVE HEART WITHOUT ANGINA PECTORIS: Primary | ICD-10-CM

## 2025-05-29 DIAGNOSIS — R06.02 SHORTNESS OF BREATH: ICD-10-CM

## 2025-05-29 DIAGNOSIS — R00.2 PALPITATIONS: ICD-10-CM

## 2025-05-29 DIAGNOSIS — E78.5 HYPERLIPIDEMIA, UNSPECIFIED HYPERLIPIDEMIA TYPE: ICD-10-CM

## 2025-05-29 NOTE — PROGRESS NOTES
"[facility], [facility]    Chief Complaint  Follow-up (Palpitaitons, chest pain)    Subjective    History of Present Illness {CC  Problem List  Visit  Diagnosis   Encounters  Notes  Medications  Labs  Result Review Imaging  Media :23}     Areli Barnett presents to [facility] CARDIOLOGY for   History of Present Illness     65-year-old female hyperlipidemia, hypertension, palpitations, GERD, esophageal stricture, H. 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     Follow-up on palp patient's.  Elevated heart rates.  Associated with dyspnea, diaphoresis, fatigue.  Worse with activity.  No exertional chest pain or pressure,but intermittent chest tightness.  History of anxiety.  Recently not well-controlled.    Sophie history of premature CAD.    Myocardial perfusion stress test completed 4/20/2025: Nondiagnostic due to breast and GI artifact    Echocardiogram 4/25/2025: EF 61 to 65%, LV diastolic function normal, no significant valvular disease    CT angiogram coronary arteries 5/7/2025: , fluid density lesion in the anterior mediastinum which could reflect pericardial cyst, thymic cyst or potential thymic neoplasm.  Small low-density pericardial effusion noted and mild hypodense soft plaque in the thoracic aorta.  Total calcium score 86 (mild coronary plaque).  Referral was completed for CT surgery    NO exertional CP or pressure.  Dyspnea has improved with starting to exercise.  Noted improved activity tolerance.  Pt stopped BP meds with improved fatigue.    Pt reports continued palpitations but improved overall.         Objective     Vital Signs:   Vitals:    05/29/25 1118   BP: 122/61   BP Location: Left arm   Patient Position: Sitting   Pulse: 83   SpO2: 99%   Weight: 49 kg (108 lb)   Height: 149.9 cm (59\")     Body mass index is 21.81 " kg/m².  Physical Exam  Vitals reviewed.   Constitutional:       General: She is not in acute distress.  Cardiovascular:      Rate and Rhythm: Normal rate and regular rhythm.   Pulmonary:      Effort: Pulmonary effort is normal.      Breath sounds: Normal breath sounds.   Musculoskeletal:      Right lower leg: No edema.      Left lower leg: No edema.   Skin:     Coloration: Skin is not pale.   Neurological:      Mental Status: She is alert.   Psychiatric:         Mood and Affect: Mood normal.         Behavior: Behavior normal. Behavior is cooperative.              Result Review  Data Reviewed:{ Labs  Result Review  Imaging  Med Tab  Media :23}   2018 treadmill stress test: No ischemia     Echocardiogram 2018: EF 68%, no reported significant valvular disease    Myocardial perfusion stress test completed 4/20/2025: Nondiagnostic due to breast and GI artifact    Echocardiogram 4/25/2025: EF 61 to 65%, LV diastolic function normal, no significant valvular disease    CT angiogram coronary arteries 5/7/2025: Mild hypodense plaque in the thoracic aorta.  Total calcium score 86 (mild carotid plaque)    14-day Holter 4/17/2025: Average heart rate 93 bpm.  Controlled rate 69%, tachycardic 31%.  Majority of patient triggers associated with sinus tachycardia.  1 brief SVT run (duration 3 beats)    Lab Results   Component Value Date    CHOL 206 (H) 03/04/2025    CHOL 148 11/21/2023    CHOL 154 10/25/2021    CHLPL 170 10/25/2022    CHLPL 198 03/31/2021    CHLPL 223 (H) 09/16/2020     Lab Results   Component Value Date    TRIG 55 03/04/2025    TRIG 60 11/21/2023    TRIG 63 10/25/2022     Lab Results   Component Value Date    HDL 83 (H) 03/04/2025    HDL 70 (H) 11/21/2023    HDL 99 (H) 10/25/2022     Lab Results   Component Value Date     (H) 03/04/2025    LDL 66 11/21/2023    LDL 59 10/25/2022                   Assessment and Plan {CC Problem List  Visit Diagnosis  ROS  Review (Popup)  Health Maintenance  Quality   BestPractice  Medications  SmartSets  SnapShot Encounters  Media :23}   1. Coronary artery disease involving native coronary artery of native heart without angina pectoris  Mild CAD noted on CT coronary arteries  statin  2. Palpitations  Improved  No concerning arrhythmias    3. Hyperlipidemia, unspecified hyperlipidemia type  statin    4. dyspnea of exertion  Improved with conditioning  Acceptable echo      Patient will follow-up with primary care provider and CT surgery as scheduled.  Follow-up in the heart valve center as needed.      I spent 34 minutes caring for Areli on this date of service. This time includes time spent by me in the following activities:preparing for the visit, reviewing tests, obtaining and/or reviewing a separately obtained history, performing a medically appropriate examination and/or evaluation , counseling and educating the patient/family/caregiver, documenting information in the medical record, independently interpreting results and communicating that information with the patient/family/caregiver, and care coordination    Follow Up {Instructions Charge Capture  Follow-up Communications :23}   Return if symptoms worsen or fail to improve.    Patient was given instructions and counseling regarding her condition or for health maintenance advice. Please see specific information pulled into the AVS if appropriate.  Patient was instructed to call the Heart and Valve Center with any questions, concerns, or worsening symptoms.

## 2025-06-05 DIAGNOSIS — F41.1 GENERALIZED ANXIETY DISORDER: Chronic | ICD-10-CM

## 2025-06-05 DIAGNOSIS — F32.1 CURRENT MODERATE EPISODE OF MAJOR DEPRESSIVE DISORDER, UNSPECIFIED WHETHER RECURRENT: Chronic | ICD-10-CM

## 2025-06-05 NOTE — TELEPHONE ENCOUNTER
Rx Refill Note  Requested Prescriptions     Pending Prescriptions Disp Refills    FLUoxetine (PROzac) 20 MG capsule [Pharmacy Med Name: fluoxetine 20 mg capsule] 90 capsule 1     Sig: TAKE THREE CAPSULES BY MOUTH DAILY      Last office visit with prescribing clinician: 4/15/2025   Last telemedicine visit with prescribing clinician: Visit date not found   Next office visit with prescribing clinician: 9/5/2025                         Would you like a call back once the refill request has been completed: [] Yes [] No    If the office needs to give you a call back, can they leave a voicemail: [] Yes [] No    Ninoska Espinoza MA  06/05/25, 08:39 EDT

## 2025-06-09 ENCOUNTER — TELEPHONE (OUTPATIENT)
Dept: INTERNAL MEDICINE | Facility: CLINIC | Age: 65
End: 2025-06-09
Payer: MEDICAID

## 2025-06-09 NOTE — TELEPHONE ENCOUNTER
Caller: Areli Barnett    Relationship to patient: Self      Best call back number:      Provider: DR BURT     Medication PA needed: OLOPHTADIN  .1 %    Reason for call/Prior Auth: PER PHARMACY; INSURANCE WOULDN'T APPROVE PREVIOUS REQUEST AS IT NEEDS A PA;      [facility] [address] [phone] [facility] [phone]  [phone]

## 2025-06-10 NOTE — TELEPHONE ENCOUNTER
Tried calling patient regarding this medication but  no answer, Ivm for her to call back because cannot find this in her med list .

## 2025-06-19 ENCOUNTER — OFFICE VISIT (OUTPATIENT)
Dept: PSYCHIATRY | Facility: CLINIC | Age: 65
End: 2025-06-19
Payer: MEDICAID

## 2025-06-19 VITALS
BODY MASS INDEX: 21.49 KG/M2 | OXYGEN SATURATION: 98 % | SYSTOLIC BLOOD PRESSURE: 128 MMHG | HEART RATE: 77 BPM | WEIGHT: 106.6 LBS | HEIGHT: 59 IN | DIASTOLIC BLOOD PRESSURE: 74 MMHG

## 2025-06-19 DIAGNOSIS — G47.09 OTHER INSOMNIA: ICD-10-CM

## 2025-06-19 DIAGNOSIS — F41.1 GENERALIZED ANXIETY DISORDER: ICD-10-CM

## 2025-06-19 DIAGNOSIS — F32.1 CURRENT MODERATE EPISODE OF MAJOR DEPRESSIVE DISORDER, UNSPECIFIED WHETHER RECURRENT: Primary | ICD-10-CM

## 2025-06-19 NOTE — PROGRESS NOTES
Follow Up Office Visit      Patient Name: Areli Barnett  : 1960   MRN: [id number]     Referring Provider: Salvatore Townsend MD    Chief Complaint:      ICD-10-CM ICD-9-CM   1. Current moderate episode of major depressive disorder, unspecified whether recurrent  F32.1 296.22   2. Generalized anxiety disorder  F41.1 300.02   3. Other insomnia  G47.09 780.52        History of Present Illness:   Areli Barnett is a 65 y.o. female who is here today for follow up for medication management.  History of Present Illness  The patient presents for evaluation of depression.    She has been adhering to her medication regimen, although she missed her dose today. She reports an improvement in her condition, with increased patience and a stable mood. Her boyfriend has not observed any significant changes. She has been consuming more caffeine than usual, which has led to an increase in her trazodone intake to aid sleep. She consumes caffeine late in the day, with yesterday's intake including two Monster energy drinks and a cup of coffee. She finds that caffeine boosts her energy levels, but it also disrupts her sleep. She has not been engaging in physical exercise, such as walking the dogs or going to the gym, but she remains active throughout the day. Her appetite is normal, and she has lost 2 pounds since her last visit. Despite poor eating habits and lack of exercise, she maintains mobility. She experiences intermittent nausea, which she attributes to either her medication or her diet, but this symptom resolves spontaneously. She has been taking her medication on an empty stomach.    Interim History: She reports occasional nausea, which she associates with either her diet or medication intake. Despite this, she notes that her patience has improved, particularly in interactions with her boyfriend. Her mood remains stable, and she has been consistent with her medication regimen, except for today.    Social  History:  - Engages in social activities with friends weekly  - Active in caring for pets    Substance Use: She consumes caffeine, including Monster energy drinks and coffee, which affects her sleep.  She also consumes 2 marijuana joints a day.      Subjective     Review of Systems:   Review of Systems    Screening Scores:   PHQ-9 Depression Screening  Little interest or pleasure in doing things? Several days   Feeling down, depressed, or hopeless? Several days   PHQ-2 Total Score 2   Trouble falling or staying asleep, or sleeping too much? Several days   Feeling tired or having little energy? More than half the days   Poor appetite or overeating? Not at all   Feeling bad about yourself - or that you are a failure or have let yourself or your family down? Several days   Trouble concentrating on things, such as reading the newspaper or watching television? More than half the days   Moving or speaking so slowly that other people could have noticed? Or the opposite - being so fidgety or restless that you have been moving around a lot more than usual? Several days     Thoughts that you would be better off dead, or of hurting yourself in some way? Not at all   PHQ-9 Total Score 9   If you checked off any problems, how difficult have these problems made it for you to do your work, take care of things at home, or get along with other people? Somewhat difficult        FREDERIC-7 Anxiety Screening  Feeling nervous, anxious or on edge? Several days   Not being able to stop or control worrying? Several days   Worrying too much about different things? Several days   Trouble relaxing? Not at all   Being so restless that it is hard to sit still? Several days   Becoming easily annoyed or irritable? Several days   Feeling afraid as if something awful might happen? Several days   FREDERIC-7 Total Score 6   If you checked any problems, how difficult have these problems made it for you to do your work, take care of things at home, or get along  with other people? Somewhat difficult          Medications:     Current Outpatient Medications:     alendronate (FOSAMAX) 70 MG tablet, Take 1 tablet by mouth 1 (One) Time Per Week., Disp: , Rfl:     atorvastatin (LIPITOR) 20 MG tablet, Take 1 tablet by mouth every night at bedtime., Disp: 90 tablet, Rfl: 1    cetirizine (zyrTEC) 10 MG tablet, Take 1 tablet by mouth every night at bedtime., Disp: 30 tablet, Rfl: 5    cyclobenzaprine (FLEXERIL) 10 MG tablet, Take 1 tablet by mouth 3 (Three) Times a Day As Needed for Muscle Spasms., Disp: 30 tablet, Rfl: 2    Diclofenac Sodium (VOLTAREN) 1 % gel gel, Apply  topically to the appropriate area as directed 4 (Four) Times a Day As Needed (For pain). 2-4 g dose x4 PRN, Disp: 350 g, Rfl: 1    dicyclomine (BENTYL) 20 MG tablet, Take 1 tablet by mouth Every 6 (Six) Hours. PRN, Disp: 180 tablet, Rfl: 1    estradiol (VAGIFEM) 10 MCG tablet vaginal tablet, Insert 1 tablet into the vagina 2 (Two) Times a Week., Disp: 8 tablet, Rfl: 5    famotidine (PEPCID) 40 MG tablet, Take 0.5 tablets by mouth Daily As Needed for Heartburn., Disp: 90 tablet, Rfl: 3    FLUoxetine (PROzac) 20 MG capsule, TAKE THREE CAPSULES BY MOUTH DAILY, Disp: 90 capsule, Rfl: 1    fluticasone (FLONASE) 50 MCG/ACT nasal spray, Administer 1 spray into the nostril(s) as directed by provider Daily. In each nostril, Disp: 48 g, Rfl: 3    hydrOXYzine (ATARAX) 25 MG tablet, Take 0.5-1 tablets by mouth 3 (Three) Times a Day As Needed for Anxiety., Disp: 60 tablet, Rfl: 1    meloxicam (MOBIC) 15 MG tablet, TAKE ONE TABLET BY MOUTH EVERY DAY WITH FOOD, Disp: 90 tablet, Rfl: 1    traZODone (DESYREL) 50 MG tablet, Take 1 tablet by mouth At Night As Needed for Sleep., Disp: 90 tablet, Rfl: 1    tretinoin (Retin-A) 0.05 % cream, Apply  topically to the appropriate area as directed At Night As Needed (acne)., Disp: 20 g, Rfl: 2    Vortioxetine HBr (TRINTELLIX) 10 MG tablet tablet, Take 1 tablet by mouth Daily With Breakfast.,  "Disp: 30 tablet, Rfl: 1    Medication Considerations:  CONNOR reviewed and appropriate.      Allergies:   Allergies   Allergen Reactions    Latex Rash    Penicillins Rash         Objective     Physical Exam:  Vital Signs:   Vitals:    06/19/25 1103   BP: 128/74   Pulse: 77   SpO2: 98%   Weight: 48.4 kg (106 lb 9.6 oz)   Height: 149.9 cm (59\")     Body mass index is 21.53 kg/m².     RISK ASSESSMENT:  Patient denies any high risk factors today.    Mental Status Exam:   MENTAL STATUS EXAM   General Appearance:  Cleanly groomed and dressed  Eye Contact:  Good eye contact  Attitude:  Cooperative  Motor Activity:  Fidgety and normal gait, posture  Muscle Strength:  Normal  Speech:  Loud  Language:  Spontaneous  Mood and affect:  Normal, pleasant and happy  Hopelessness:  1  Loneliness: 1  Thought Process:  Logical  Associations/ Thought Content:  No delusions  Hallucinations:  None  Suicidal Ideations:  Not present  Homicidal Ideation:  Not present  Sensorium:  Alert  Orientation:  Person, place and time  Immediate Recall, Recent, and Remote Memory:  Intact  Attention Span/ Concentration:  Good  Fund of Knowledge:  Appropriate for age and educational level  Intellectual Functioning:  Average range  Insight:  Fair  Judgement:  Fair  Reliability:  Fair  Impulse Control:  Fair         Assessment / Plan      Visit Diagnosis/Orders Placed This Visit:  Diagnoses and all orders for this visit:    1. Current moderate episode of major depressive disorder, unspecified whether recurrent (Primary)  -     Vortioxetine HBr (TRINTELLIX) 10 MG tablet tablet; Take 1 tablet by mouth Daily With Breakfast.  Dispense: 30 tablet; Refill: 1    2. Generalized anxiety disorder    3. Other insomnia       Assessment & Plan  Problems:  - Depression    Content of Therapy:  - Discussed the patient's current activities, including spending time with friends and caring for her cats.  - Addressed the patient's medication regimen, including the transition " from Wellbutrin to Trintellix and the potential side effects.  - Explored the patient's caffeine consumption and its impact on her sleep and nausea.  - Talked about the patient's mood, sleep patterns, and appetite.    Clinical Impression:  The patient appears to be responding positively to the new medication regimen with Trintellix, reporting increased patience and stable mood. However, she experiences occasional nausea, which may be exacerbated by her caffeine intake or taking medication on an empty stomach. Sleep patterns are affected by high caffeine consumption, leading to increased use of Trazodone. The patient has lost 2 pounds since the last visit, which she attributes to improved dietary habits despite reduced exercise.    Therapeutic Intervention:  - Advised the patient to reduce caffeine intake, particularly limiting to one Monster energy drink per day.  - Discussed the importance of taking medication with food to reduce nausea.  - Planned to increase Trintellix dosage to 10 mg and decrease Prozac dosage to 40 mg.    Follow-up:  - Follow-up appointment scheduled in 1 month to monitor response to medication adjustments and overall progress.    Notes & Risk Factors:  - None    Time:    Functional Status: Mild impairment     Prognosis: Good with Ongoing Treatment     Impression/Formulation:  Patient appeared alert and oriented. Patient is receptive to assistance with maintaining a stable lifestyle.  Patient presents with history of     ICD-10-CM ICD-9-CM   1. Current moderate episode of major depressive disorder, unspecified whether recurrent  F32.1 296.22   2. Generalized anxiety disorder  F41.1 300.02   3. Other insomnia  G47.09 780.52   .     Care/ Treatment Plan:   Care Plan and Treatment Plan updated in Epic. This is available for printing if necessary.   Plan:  - Increase Trintellix to 10 mg for MDD.  - Decrease Prozac to 40 mg for MDD and anxiety.  -Continue trazodone 50 mg nightly for insomnia.  -  Reduce caffeine intake, particularly limiting to one Monster energy drink per day to aid in sleep as well as nausea reduction.  - Take medication with food to reduce nausea.    Patient will continue supportive psychotherapy efforts and medications as indicated. Clinic will obtain release of information for current treatment team for continuity of care as needed. Patient will contact this office, call 911 or present to the nearest emergency room should suicidal or homicidal ideations occur.  Discussed medication options and treatment plan of prescribed medication(s) as well as the risks, benefits, and potential side effects. Patient ackowledged and verbally consented to continue with current treatment plan and was educated on the importance of compliance with treatment and follow-up appointments.     Quality Measures:   Areli Barnett  reports that she quit smoking about 6 years ago. Her smoking use included cigarettes. She started smoking about 11 years ago. She has a 1.3 pack-year smoking history. She has been exposed to tobacco smoke. She has never used smokeless tobacco. I have educated her on the risk of diseases from using tobacco products such as cancer, COPD, and heart disease.     Patient has denied an present or past tobacco use. No tobacco cessation education necessary.          ACP discussion was declined by the patient. Patient does not have an advance directive, declines further assistance.    Depression (PHQ >9): 9    Follow Up:   Return in about 4 weeks (around 7/17/2025).    Patient or patient representative verbalized consent for the use of Ambient Listening during the visit with  JAI Bledsoe for chart documentation. 6/19/2025  11:39 EDT        JAI Steiner, PMHNP-BC [facility]

## 2025-06-26 ENCOUNTER — OFFICE VISIT (OUTPATIENT)
Dept: CARDIAC SURGERY | Facility: CLINIC | Age: 65
End: 2025-06-26
Payer: MEDICAID

## 2025-06-26 VITALS
HEIGHT: 59 IN | OXYGEN SATURATION: 99 % | SYSTOLIC BLOOD PRESSURE: 106 MMHG | HEART RATE: 78 BPM | BODY MASS INDEX: 21.13 KG/M2 | TEMPERATURE: 98.6 F | WEIGHT: 104.8 LBS | DIASTOLIC BLOOD PRESSURE: 70 MMHG

## 2025-06-26 DIAGNOSIS — R22.2 CHEST MASS: Primary | ICD-10-CM

## 2025-06-26 DIAGNOSIS — J98.59 MEDIASTINAL MASS: Primary | ICD-10-CM

## 2025-06-26 NOTE — PROGRESS NOTES
[facility] Cardiothoracic Surgery New Patient Office Note     Date of Encounter: 2025     Name: Areli Barnett  : 1960     Referred By: Tameka Perez APRN  PCP: Salvatore Townsend MD    Chief Complaint:    Chief Complaint   Patient presents with    Consult     New pt per Tameka VERGARA for fluid density lesion of the anterior mediastinal. PT  states that she is feeling good today with no complaint.        Subjective      History of Present Illness:    Areli Barnett is a 65 y.o. female new patient referred to Dr. Perez per JAI Aguilar for incidental lesion noted in anterior mediastinum 2.4 x 2.7 cm noted on recent CTA coronary.  PMH: Allergic rhinitis, hyperlipidemia, hypertension, palpitations, GERD, esophageal stricture, anxiety/depression, osteoarthritis, degenerative lumbar disc disease with radiculopathy, and recent evaluation for chest pain[facility]ations [facility].  A coronary CTA study was completed 2025 which was negative for evidence of flow-limiting CAD, but demonstrated incidental of 2.4 x 2.7 cm anterior mediastinal density.  No associated mediastinal adenopathy was noted.  Patient presents to CT surgery for consultation.    Review of Systems:  Review of Systems   Constitutional: Negative for chills, decreased appetite, fever, malaise/fatigue, weight gain and weight loss.   HENT:  Positive for congestion (sinus drainage). Negative for hearing loss.    Cardiovascular:  Negative for chest pain, claudication, cyanosis, dyspnea on exertion, irregular heartbeat, leg swelling, near-syncope, orthopnea, palpitations, paroxysmal nocturnal dyspnea and syncope.   Endocrine: Negative for polydipsia, polyphagia and polyuria.   Hematologic/Lymphatic: Negative for adenopathy. Does not bruise/bleed easily.   Musculoskeletal:  Positive for neck pain. Negative for arthritis, falls, gout, joint pain, joint swelling, muscle weakness and myalgias.   Gastrointestinal:   Negative for abdominal pain, anorexia, dysphagia, heartburn, nausea and vomiting.   Genitourinary:  Negative for dysuria and hematuria.   Neurological:  Negative for dizziness, focal weakness, headaches, loss of balance, numbness, seizures, vertigo and weakness.   Psychiatric/Behavioral:  Negative for altered mental status, depression, substance abuse and suicidal ideas. The patient is nervous/anxious.    Allergic/Immunologic: Negative for HIV exposure and persistent infections.       I have reviewed the following portions of the patient's history: problem list, current medications, allergies, past surgical history, past medical history, past social history, past family history, and ROS and confirm it's accurate.    Allergies:  Allergies   Allergen Reactions    Latex Rash    Penicillins Rash       Medications:      Current Outpatient Medications:     alendronate (FOSAMAX) 70 MG tablet, Take 1 tablet by mouth 1 (One) Time Per Week., Disp: , Rfl:     atorvastatin (LIPITOR) 20 MG tablet, Take 1 tablet by mouth every night at bedtime., Disp: 90 tablet, Rfl: 1    cetirizine (zyrTEC) 10 MG tablet, Take 1 tablet by mouth every night at bedtime., Disp: 30 tablet, Rfl: 5    cyclobenzaprine (FLEXERIL) 10 MG tablet, Take 1 tablet by mouth 3 (Three) Times a Day As Needed for Muscle Spasms., Disp: 30 tablet, Rfl: 2    Diclofenac Sodium (VOLTAREN) 1 % gel gel, Apply  topically to the appropriate area as directed 4 (Four) Times a Day As Needed (For pain). 2-4 g dose x4 PRN, Disp: 350 g, Rfl: 1    dicyclomine (BENTYL) 20 MG tablet, Take 1 tablet by mouth Every 6 (Six) Hours. PRN, Disp: 180 tablet, Rfl: 1    estradiol (VAGIFEM) 10 MCG tablet vaginal tablet, Insert 1 tablet into the vagina 2 (Two) Times a Week., Disp: 8 tablet, Rfl: 5    famotidine (PEPCID) 40 MG tablet, Take 0.5 tablets by mouth Daily As Needed for Heartburn., Disp: 90 tablet, Rfl: 3    FLUoxetine (PROzac) 20 MG capsule, TAKE THREE CAPSULES BY MOUTH DAILY, Disp: 90  "capsule, Rfl: 1    fluticasone (FLONASE) 50 MCG/ACT nasal spray, Administer 1 spray into the nostril(s) as directed by provider Daily. In each nostril, Disp: 48 g, Rfl: 3    hydrOXYzine (ATARAX) 25 MG tablet, Take 0.5-1 tablets by mouth 3 (Three) Times a Day As Needed for Anxiety., Disp: 60 tablet, Rfl: 1    meloxicam (MOBIC) 15 MG tablet, TAKE ONE TABLET BY MOUTH EVERY DAY WITH FOOD, Disp: 90 tablet, Rfl: 1    traZODone (DESYREL) 50 MG tablet, Take 1 tablet by mouth At Night As Needed for Sleep., Disp: 90 tablet, Rfl: 1    tretinoin (Retin-A) 0.05 % cream, Apply  topically to the appropriate area as directed At Night As Needed (acne)., Disp: 20 g, Rfl: 2    Vortioxetine HBr (TRINTELLIX) 10 MG tablet tablet, Take 1 tablet by mouth Daily With Breakfast., Disp: 30 tablet, Rfl: 1    History:   Past Medical History:   Diagnosis Date    ADHD (attention deficit hyperactivity disorder)     Anemia     Arthritis     Body piercing     EARS    Depression     Dysphagia     REPORTS SOMETIMES SHE HAS PAIN WHEN SWALLOWING FOOD    Elevated cholesterol     Elevated LFTs     Endometriosis     Epigastric pain     Exposure to TB     REPORTS MANY YEARS AGO. REPORTS TB TESTING HAS ALWAYS BEEN NEGATIVE.     Family history of breast cancer 10/06/2021    9/2021: Genetic testing neg for pathogenic mutations in BRCA1/2 and 34 additional genes included on the Cancer[employer] panel. Lifetime breast cancer risk is estimated to be up to 8.5%     GERD (gastroesophageal reflux disease)     History of bronchitis 01/2019    History of chest pain     REPORTS FALL 2018 AND THAT SHE HAD TESTING THAT WAS WNL'S. REPORTS SHE IS NOW BEING CHECKED FOR GI.     History of exercise stress test     2018 - REPORTS WAS TOLD ALL WNL'S     History of fracture     TOE    Hyperlipidemia     Latex allergy     Low back pain     Murmur     REPORTS \"A SLIGHT MURMUR\"    Osteoporosis     Plantar fasciitis     Seasonal allergies     Vertigo     Wears glasses     PRN       Past " Surgical History:   Procedure Laterality Date    ABDOMINOPLASTY  2003    AUGMENTATION MAMMAPLASTY Bilateral     COLONOSCOPY  2016    COSMETIC SURGERY      ENDOSCOPY N/A 2019    Procedure: ESOPHAGOGASTRODUODENOSCOPY with cold biopsy and esophageal dilation;  Surgeon: Brenden Steward MD;  Location: [facility] ENDOSCOPY;  Service: Gastroenterology    ENDOSCOPY N/A 2019    Procedure: ESOPHAGOGASTRODUODENOSCOPY W/ COLD FORCEP BIOPSIES;  Surgeon: Brenden Steward MD;  Location: [facility] ENDOSCOPY;  Service: Gastroenterology    EPIDURAL      UPPER GASTROINTESTINAL ENDOSCOPY  2019    WISDOM TOOTH EXTRACTION         Social History     Socioeconomic History    Marital status: Single    Number of children: 1   Tobacco Use    Smoking status: Former     Current packs/day: 0.00     Average packs/day: 0.3 packs/day for 5.0 years (1.3 ttl pk-yrs)     Types: Cigarettes     Start date: 2014     Quit date: 2019     Years since quittin.3     Passive exposure: Past    Smokeless tobacco: Never   Vaping Use    Vaping status: Former    Quit date: 2025    Substances: THC, CBD    Passive vaping exposure: Yes   Substance and Sexual Activity    Alcohol use: Yes     Comment: once monthly but drinks a lot when does    Drug use: Yes     Types: Marijuana     Comment: daily    Sexual activity: Defer     Partners: Male        Family History   Problem Relation Age of Onset    Lung cancer Mother     Cancer Mother     Heart attack Father     Crohn's disease Sister     Breast cancer Sister     Cancer Sister     Stomach cancer Paternal Grandfather     Alcohol abuse Paternal Grandfather     Breast cancer Maternal Aunt     Colon cancer Neg Hx     Cirrhosis Neg Hx     Liver disease Neg Hx     Liver cancer Neg Hx     Ulcerative colitis Neg Hx     Esophageal cancer Neg Hx        Objective   Physical Exam:  Vitals:    25 0922 25 0923   BP: 102/58  Comment: rigth arm 106/70  Comment: left arm   Pulse: 78    Temp:  "98.6 °F (37 °C)    SpO2: 99%    Weight: 47.5 kg (104 lb 12.8 oz)    Height: 149.9 cm (59\")  Comment: per pt       Body mass index is 21.17 kg/m².    Physical Exam  Vitals reviewed.   Constitutional:       General: She is not in acute distress.     Appearance: She is well-developed and well-groomed. She is not toxic-appearing.   HENT:      Head: Normocephalic and atraumatic.   Eyes:      General: Lids are normal.      Conjunctiva/sclera: Conjunctivae normal.      Pupils: Pupils are equal, round, and reactive to light.   Neck:      Vascular: No JVD.   Cardiovascular:      Rate and Rhythm: Normal rate and regular rhythm.      Pulses:           Carotid pulses are 2+ on the right side and 2+ on the left side.       Radial pulses are 2+ on the right side and 2+ on the left side.      Heart sounds: S1 normal and S2 normal. No murmur heard.  Pulmonary:      Effort: Pulmonary effort is normal. No respiratory distress.      Breath sounds: Normal breath sounds.   Musculoskeletal:         General: Normal range of motion.      Cervical back: Normal range of motion and neck supple.      Right lower leg: No edema.      Left lower leg: No edema.   Lymphadenopathy:      Cervical: No cervical adenopathy.      Upper Body:      Right upper body: No supraclavicular or axillary adenopathy.      Left upper body: No supraclavicular or axillary adenopathy.   Skin:     General: Skin is warm and dry.      Capillary Refill: Capillary refill takes less than 2 seconds.   Neurological:      General: No focal deficit present.      Mental Status: She is alert and oriented to person, place, and time.   Psychiatric:         Attention and Perception: Attention normal.         Mood and Affect: Mood normal.         Speech: Speech normal.         Behavior: Behavior is cooperative.         Cognition and Memory: Cognition normal.         Judgment: Judgment normal.         Imaging/Labs:   CT ANGIOGRAM CORONARY: 5/7/2025 (Personally reviewed and agree w/ " Radiology assessment)  Findings:  Eccentric hypodense soft plaque in the thoracic aorta, mild. Negative for thoracic aortic aneurysm. Normal caliber main pulmonary artery. No evidence of pulmonary embolism. Small low-density pericardial effusion. Incidental low-density lesion in the   anterior mediastinum measuring 2.4 x 2.7 cm. Slightly triangular-shaped anteriorly, otherwise circumscribed morphology. No suspicious adenopathy. Esophagus unremarkable. Trachea patent. Minimal dependent atelectasis. No infectious appearing consolidation   or suspicious nodule within included field-of-view. Bilateral breast implants. Possible internal capsular rupture on the left incompletely assessed by CT. Few chronic nondisplaced healed right-sided rib fractures. Accessory left hepatic artery originating from the left gastric artery.   IMPRESSION:  1. Benign-appearing fluid density lesion in the anterior mediastinum. This could reflect a pericardial cyst, thymic cyst or potential thymic neoplasm. Consider nonemergent cardiothoracic surgery consultation for management recommendations.  2. Small low-density pericardial effusion.  3. Mild hypodense soft plaque in the thoracic aorta.   Please refer to CT Angiogram Coronary   Cardiology Interp report for information on cardiac structures.   Electronically Signed: Homer Lopez MD  5/7/2025     Cardiology Coronary CTA Interpretation Summary     No evidence of intravascular plaque, mildly elevated coronary calcium score 86    Continue healthy lifestyle measures for prevention.   CORONARY CT ANGIOGRAPHY WITH CALCIUM SCORE    CLINICAL HISTORY: typical angina   TECHNIQUE: Using a Siemens Force scanner, a preliminary  study was obtained, followed by coronary artery calcium protocol.  0.5 mm collimated images were obtained through the coronary arteries.  Data were transferred offline for 3D reconstructions using [employer].    CONTRAST: 65 mL iopamidol (ISOVUE-370)    ACQUISITION:  Retrospective ECG triggered acquisition was used.  Heart rate at the time of acquisition was approximately 58 BPM.   MEDICATIONS:   Metoprolol tartrate 50 mg oral   Metoprolol tartrate 0 IV  Nitroglycerin 0.8 mg sublingual tablet     TECHNICAL QUALITY: Excellent, with no artifacts     FINDINGS:   Coronary artery calcification findings:  The total calcium score is 86.1 indicating mild amount of calcified plaque in the coronary tree.  Left main  37.5  LAD     48.6           LCx      0            RCA     0   Grading Scale for Plaque Dawson:  P1 (CAC 1-100) Mild amount of plaque   P2 (-300) Moderate amount of plaque   P3 (-999) Severe amount of plaque   P4 (CAC > 1000) Extensive amount of plaque       Angiographic Findings:   The coronary anatomy is right dominant.  Left Main: Large-caliber vessel bifurcates in LAD and left circumflex, normal.  LAD: Moderate caliber vessel gives rise to 2 diagonal branches, normal.  LCX: Moderate caliber nondominant vessel gives rise to 2 OM branches, normal.  RCA: Moderate Caliber dominant vessel gives rise to PDA to PLV, normal.       Grading Scale for Stenosis Severity:  Category Degree Interpretation   CAD-RADS 0 0% (No plaque or stenosis) Absence of CAD   CAD-RADS 1 1-24% (Minimal stenosis or plaque w/o stenosis) Minimal non-obstructive CAD   CAD-RADS 2 25-49% (Mild stenosis) Mild non-obstructive CAD   CAD-RADS 3 50-69% (Moderate stenosis) Moderate stenosis   CAD-RADS 4 A - 70-99% stenosis or  B - Left main >/= 50% or 3-vessel obstructive (>/=70%) disease Severe stenosis   CAD-RADS 5 100% (total occlusion) Total coronary occlusion or sub-total occlusion   CAD-RADS N Non-diagnostic study Obstructive CAD cannot be excluded         Noncoronary Cardiac Findings:  Cardiac chambers: Normal in size with no obvious filling defects within the ventricles, atria, or atrial appendages. No stigmata of prior infarction.  Cardiac valves: Aortic valve is trileaflet. There is no  thickening or calcification in the aortic and mitral valves.  Pulmonary arteries: Normal in caliber. No obvious filling defects in the visualized central pulmonary arteries to suggest large central pulmonary emboli.   Pulmonary veins: Normal pulmonary vein anatomy.  Pericardium: The pericardial contour is preserved with no effusion, thickening, or calcifications.  Left ventricle: Calculated LVEF 71%.  Aorta: Partially visualized aorta appears to be of normal size and caliber.  Non-cardiac findings reported separately by radiology.     IMPRESSION:  Normal coronary arteries (0%) . CAD RADS 0  Total calcium score 86.1 indicating mild amount of calcified coronary plaque  No non-atherosclerotic coronary abnormalities  Normal LV systolic function (calculated LVEF 71%)  Please refer to the radiology report for information on non-cardiac structures.      RECOMMENDATION:  Continue healthy lifestyle measures    TTE 4/25/25 Echocardiogram Findings  Left Ventricle Left ventricular systolic function is normal. Calculated left ventricular EF = 62.7% Left ventricular ejection fraction appears to be 61 - 65%.     Normal left ventricular cavity size and wall thickness noted. All left ventricular wall segments contract normally. Left ventricular diastolic function was normal.   Right Ventricle Normal right ventricular cavity size, wall thickness, systolic function and septal motion noted.   Left Atrium Normal left atrial size and volume noted.   Right Atrium Normal right atrial cavity size noted. The inferior vena cava is normally sized. The diameter of the inferior vena cava is 1.3 cm.   Aortic Valve The aortic valve is structurally normal with no regurgitation or stenosis present.   Mitral Valve Trace mitral valve regurgitation is present. No significant mitral valve stenosis is present.   Tricuspid Valve The tricuspid valve is structurally normal with no significant regurgitation or significant stenosis present.   Pulmonic Valve  The pulmonic valve is structurally normal with no regurgitation or significant stenosis present.   Greater Vessels No dilation of the aortic root is present. No dilation of the ascending aorta is present.   Pericardium The pericardium is normal. There is no evidence of pericardial effusion. .       Assessment / Plan      Assessment / Plan:  1. Mediastinal mass   - 65 y.o. female new patient referred to Dr. Perez per JAI Aguilar for incidental lesion noted in anterior mediastinum 2.4 x 2.7 cm noted on recent CTA coronary.  - PMH: Allergic rhinitis, hyperlipidemia, hypertension, palpitations, GERD, esophageal stricture, anxiety/depression, osteoarthritis, degenerative lumbar disc disease with radiculopathy, and recent evaluation for chest pain[facility]ations [facility].   - Coronary CTA study 5/7/2025: negative for evidence of flow-limiting CAD, but demonstrated incidental of 2.4 x 2.7 cm anterior mediastinal density.  No associated mediastinal adenopathy was noted.   - FMH: mother lung cancer, sister breast cancer  - No hx cigarette use.  Does use marijuana and vape products occasionally  - Reviewed CTA imaging w/ patient .  Discussed possible differential diagnoses including pericardial cyst, thymic cyst, thymoma, etc.    - Coronary CTA images of the mass are limited.  Will request chest MRI to better assess mass morphology and adjacent structures.          Follow Up:   Return in about 4 weeks (around 7/24/2025) for Chest MRI with contrast.   Or sooner for any further concerns or worsening sign and symptoms. If unable to reach us in the office please dial 911 or go to the nearest emergency department.      JAI Moreno  [facility] Cardiothoracic Surgery    Time Spent: I spent 60 minutes caring for Areli on this date of service. This time includes time spent by me in the following activities: preparing for the visit, reviewing tests, obtaining and/or reviewing a separately obtained  history, performing a medically appropriate examination and/or evaluation, counseling and educating the patient/family/caregiver, ordering medications, tests, or procedures, documenting information in the medical record, independently interpreting results and communicating that information with the patient/family/caregiver, and care coordination.

## 2025-06-27 ENCOUNTER — HOSPITAL ENCOUNTER (OUTPATIENT)
Dept: MAMMOGRAPHY | Facility: HOSPITAL | Age: 65
Discharge: HOME | End: 2025-06-27
Payer: MEDICAID

## 2025-06-27 ENCOUNTER — APPOINTMENT (OUTPATIENT)
Dept: BONE DENSITY | Facility: HOSPITAL | Age: 65
End: 2025-06-27
Payer: MEDICAID

## 2025-06-27 DIAGNOSIS — Z12.31 BREAST CANCER SCREENING BY MAMMOGRAM: ICD-10-CM

## 2025-06-27 DIAGNOSIS — M81.0 OSTEOPOROSIS WITHOUT CURRENT PATHOLOGICAL FRACTURE, UNSPECIFIED OSTEOPOROSIS TYPE: Chronic | ICD-10-CM

## 2025-06-27 PROCEDURE — 77063 BREAST TOMOSYNTHESIS BI: CPT

## 2025-06-27 PROCEDURE — 77080 DXA BONE DENSITY AXIAL: CPT

## 2025-06-27 PROCEDURE — 77067 SCR MAMMO BI INCL CAD: CPT

## 2025-07-06 DIAGNOSIS — R10.9 ABDOMINAL SPASMS: ICD-10-CM

## 2025-07-07 RX ORDER — DICYCLOMINE HCL 20 MG
20 TABLET ORAL EVERY 6 HOURS
Qty: 180 TABLET | Refills: 1 | Status: SHIPPED | OUTPATIENT
Start: 2025-07-07

## 2025-07-14 ENCOUNTER — RESULTS FOLLOW-UP (OUTPATIENT)
Dept: INTERNAL MEDICINE | Facility: CLINIC | Age: 65
End: 2025-07-14
Payer: MEDICAID

## 2025-07-14 DIAGNOSIS — M81.0 OSTEOPOROSIS WITHOUT CURRENT PATHOLOGICAL FRACTURE, UNSPECIFIED OSTEOPOROSIS TYPE: Primary | ICD-10-CM

## 2025-08-05 DIAGNOSIS — F41.1 GENERALIZED ANXIETY DISORDER: Chronic | ICD-10-CM

## 2025-08-05 RX ORDER — HYDROXYZINE HYDROCHLORIDE 25 MG/1
TABLET, FILM COATED ORAL
Qty: 60 TABLET | Refills: 1 | Status: SHIPPED | OUTPATIENT
Start: 2025-08-05

## 2025-08-07 DIAGNOSIS — N94.10 DYSPAREUNIA IN FEMALE: ICD-10-CM

## 2025-08-07 DIAGNOSIS — N95.2 POSTMENOPAUSAL ATROPHIC VAGINITIS: ICD-10-CM

## 2025-08-07 RX ORDER — ESTRADIOL 10 UG/1
1 TABLET, FILM COATED VAGINAL 2 TIMES WEEKLY
Qty: 8 TABLET | Refills: 5 | Status: SHIPPED | OUTPATIENT
Start: 2025-08-07

## 2025-08-08 ENCOUNTER — HOSPITAL ENCOUNTER (OUTPATIENT)
Dept: MRI IMAGING | Facility: HOSPITAL | Age: 65
Discharge: HOME | End: 2025-08-08
Payer: MEDICAID

## 2025-08-08 DIAGNOSIS — R22.2 CHEST MASS: ICD-10-CM

## 2025-08-08 PROCEDURE — 71550 MRI CHEST W/O DYE: CPT

## 2025-08-15 DIAGNOSIS — F41.1 GENERALIZED ANXIETY DISORDER: Chronic | ICD-10-CM

## 2025-08-15 DIAGNOSIS — F32.1 CURRENT MODERATE EPISODE OF MAJOR DEPRESSIVE DISORDER, UNSPECIFIED WHETHER RECURRENT: Chronic | ICD-10-CM

## 2025-08-15 RX ORDER — AMLODIPINE BESYLATE 5 MG/1
5 TABLET ORAL DAILY
Qty: 30 TABLET | Refills: 0 | Status: SHIPPED | OUTPATIENT
Start: 2025-08-15

## 2025-08-18 ENCOUNTER — OFFICE VISIT (OUTPATIENT)
Dept: CARDIAC SURGERY | Facility: CLINIC | Age: 65
End: 2025-08-18
Payer: MEDICAID

## 2025-08-18 VITALS
OXYGEN SATURATION: 99 % | SYSTOLIC BLOOD PRESSURE: 100 MMHG | BODY MASS INDEX: 21.29 KG/M2 | TEMPERATURE: 96.9 F | HEIGHT: 59 IN | DIASTOLIC BLOOD PRESSURE: 58 MMHG | WEIGHT: 105.6 LBS | HEART RATE: 73 BPM

## 2025-08-18 DIAGNOSIS — J98.59 MEDIASTINAL MASS: Primary | ICD-10-CM

## 2025-08-18 PROCEDURE — 1160F RVW MEDS BY RX/DR IN RCRD: CPT | Performed by: NURSE PRACTITIONER

## 2025-08-18 PROCEDURE — 3074F SYST BP LT 130 MM HG: CPT | Performed by: NURSE PRACTITIONER

## 2025-08-18 PROCEDURE — 99213 OFFICE O/P EST LOW 20 MIN: CPT | Performed by: NURSE PRACTITIONER

## 2025-08-18 PROCEDURE — 1159F MED LIST DOCD IN RCRD: CPT | Performed by: NURSE PRACTITIONER

## 2025-08-18 PROCEDURE — 3078F DIAST BP <80 MM HG: CPT | Performed by: NURSE PRACTITIONER

## (undated) DEVICE — FRCP BIOP COLD ENDOJAW ALLGTR W/NDL 2.8X2300MM BLU

## (undated) DEVICE — Device

## (undated) DEVICE — ENDOGATOR AUXILIARY WATER JET CONNECTOR: Brand: ENDOGATOR

## (undated) DEVICE — DEV INFL ALLIANCE2 SYS

## (undated) DEVICE — Device: Brand: DEFENDO AIR/WATER/SUCTION AND BIOPSY VALVE

## (undated) DEVICE — CONMED SCOPE SAVER BITE BLOCK, 20X27 MM: Brand: SCOPE SAVER

## (undated) DEVICE — ESOPHAGEAL BALLOON DILATATION CATHETER: Brand: CRE FIXED WIRE

## (undated) DEVICE — KT ORCA VLV SXN AIR/H2O W/SEAL 1P/U STRL

## (undated) DEVICE — 2000CC GUARDIAN II: Brand: GUARDIAN